# Patient Record
Sex: FEMALE | Race: WHITE | NOT HISPANIC OR LATINO | Employment: OTHER | URBAN - METROPOLITAN AREA
[De-identification: names, ages, dates, MRNs, and addresses within clinical notes are randomized per-mention and may not be internally consistent; named-entity substitution may affect disease eponyms.]

---

## 2018-05-04 ENCOUNTER — OFFICE VISIT (OUTPATIENT)
Dept: URGENT CARE | Facility: URGENT CARE | Age: 58
End: 2018-05-04
Payer: COMMERCIAL

## 2018-05-04 VITALS
HEART RATE: 70 BPM | DIASTOLIC BLOOD PRESSURE: 70 MMHG | HEIGHT: 67 IN | WEIGHT: 150 LBS | BODY MASS INDEX: 23.54 KG/M2 | SYSTOLIC BLOOD PRESSURE: 108 MMHG | TEMPERATURE: 97.6 F | RESPIRATION RATE: 16 BRPM

## 2018-05-04 DIAGNOSIS — L03.115 CELLULITIS OF RIGHT LOWER EXTREMITY: Primary | ICD-10-CM

## 2018-05-04 PROCEDURE — 99203 OFFICE O/P NEW LOW 30 MIN: CPT | Performed by: INTERNAL MEDICINE

## 2018-05-04 RX ORDER — SERTRALINE HYDROCHLORIDE 25 MG/1
25 TABLET, FILM COATED ORAL DAILY
Status: ON HOLD | COMMUNITY
End: 2023-01-01

## 2018-05-04 RX ORDER — ZOLPIDEM TARTRATE 10 MG/1
10 TABLET ORAL AT BEDTIME
COMMUNITY

## 2018-05-04 RX ORDER — CEPHALEXIN 500 MG/1
500 CAPSULE ORAL 3 TIMES DAILY
Qty: 30 CAPSULE | Refills: 0 | Status: SHIPPED | OUTPATIENT
Start: 2018-05-04 | End: 2019-03-10

## 2018-05-04 ASSESSMENT — ENCOUNTER SYMPTOMS: FEVER: 0

## 2018-05-04 NOTE — MR AVS SNAPSHOT
After Visit Summary   5/4/2018    Gabriela Gray    MRN: [id number]           Patient Information     Date Of Birth          1960        Visit Information        Provider Department      5/4/2018 5:15 PM Gilma Murray MD [facility]        Today's Diagnoses     Cellulitis of right lower extremity    -  1      Care Instructions    Keflex 2 x day 10 dys  Yogurt. Probiotics  Watch for fever  Elevate  Rest extremity    Recheck Monday with primary        Cellulitis  Cellulitis is an infection of the deep layers of skin. A break in the skin, such as a cut or scratch, can let bacteria under the skin. If the bacteria get to deep layers of the skin, it can be serious. If not treated, cellulitis can get into the bloodstream and lymph nodes. The infection can then spread throughout the body. This causes serious illness.  Cellulitis causes the affected skin to become red, swollen, warm, and sore. The reddened areas have a visible border. An open sore may leak fluid (pus). You may have a fever, chills, and pain.  Cellulitis is treated with antibiotics taken for 7 to 10 days. An open sore may be cleaned and covered with cool wet gauze. Symptoms should get better 1 to 2 days after treatment is started. Make sure to take all the antibiotics for the full number of days until they are gone. Keep taking the medicine even if your symptoms go away.  Home care  Follow these tips:    Limit the use of the part of your body with cellulitis.     If the infection is on your leg, keep your leg raised while sitting. This will help to reduce swelling.    Take all of the antibiotic medicine exactly as directed until it is gone. Do not miss any doses, especially during the first 7 days. Don t stop taking the medicine when your symptoms get better.    Keep the affected area clean and dry.    Wash your hands with soap and warm water before and after touching your skin. Anyone else who  "touches your skin should also wash his or her hands. Don't share towels.  Follow-up care  Follow up with your healthcare provider, or as advised. If your infection does not go away on the first antibiotic, your healthcare provider will prescribe a different one.  When to seek medical advice  Call your healthcare provider right away if any of these occur:    Red areas that spread    Swelling or pain that gets worse    Fluid leaking from the skin (pus)    Fever higher of 100.4  F (38.0  C) or higher after 2 days on antibiotics  Date Last Reviewed: 9/1/2016 2000-2017 [employer]. [address] [place] [address]. All rights reserved. This information is not intended as a substitute for professional medical care. Always follow your healthcare professional's instructions.                Follow-ups after your visit        Who to contact     If you have questions or need follow up information about today's clinic visit or your schedule please contact [facility] directly at [phone].  Normal or non-critical lab and imaging results will be communicated to you by FrameBlasthart, letter or phone within 4 business days after the clinic has received the results. If you do not hear from us within 7 days, please contact the clinic through SeeSpacet or phone. If you have a critical or abnormal lab result, we will notify you by phone as soon as possible.  Submit refill requests through CommitChange or call your pharmacy and they will forward the refill request to us. Please allow 3 business days for your refill to be completed.          Additional Information About Your Visit        CommitChange Information     CommitChange lets you send messages to your doctor, view your test results, renew your prescriptions, schedule appointments and more. To sign up, go to [email or web address] . Click on \"Log in\" on the left side of the screen, which will take you to the Welcome page. Then click on \"Sign up Now\" " "on the right side of the page.     You will be asked to enter the access code listed below, as well as some personal information. Please follow the directions to create your username and password.     Your access code is: [email or web address]  Expires: 2018  5:38 PM     Your access code will  in 90 days. If you need help or a new code, please call your [facility] clinic or [phone].        Care EveryWhere ID     This is your Care EveryWhere ID. This could be used by other organizations to access your [facility] medical records  [id number]        Your Vitals Were     Pulse Temperature Respirations Height Breastfeeding? BMI (Body Mass Index)    70 97.6  F (36.4  C) (Oral) 16 5' 7\" (1.702 m) No 23.49 kg/m2       Blood Pressure from Last 3 Encounters:   18 108/70   06 96/64    Weight from Last 3 Encounters:   18 150 lb (68 kg)              Today, you had the following     No orders found for display         Today's Medication Changes          These changes are accurate as of 18  5:41 PM.  If you have any questions, ask your nurse or doctor.               Start taking these medicines.        Dose/Directions    cephALEXin 500 MG capsule   Commonly known as:  KEFLEX   Used for:  Cellulitis of right lower extremity   Started by:  Gilma Murray MD        Dose:  500 mg   Take 1 capsule (500 mg) by mouth 3 times daily   Quantity:  30 capsule   Refills:  0         Stop taking these medicines if you haven't already. Please contact your care team if you have questions.     ZANTAC 150 MG tablet   Generic drug:  ranitidine   Stopped by:  Gilma Murray MD           ZEGERID  MG per capsule   Generic drug:  omeprazole-sodium bicarbonate   Stopped by:  Gilma Murray MD                Where to get your medicines      These medications were sent to [id number] - [place], [place] - [address]  [address] [facility] [address]     Phone:  [phone]     " cephALEXin 500 MG capsule                Primary Care Provider Office Phone # Fax #    Hanan J Rosenstein, [phone]       [facility] [address] [address]        Equal Access to Services     EDMUNDO DUNCAN : Zuly jeter ricoo Sodorothyali, waaxda luqadaha, qaybta kaalmada adeegyada, obdulio aun peytonfeng hidalgo lester mccartney. So [facility] [phone].    ATENCIÓN: Si habla español, tiene a ryan disposición servicios gratuitos de asistencia lingüística. Llame al [phone].    We comply with applicable federal civil rights laws and [place] laws. We do not discriminate on the basis of race, color, national origin, age, disability, sex, sexual orientation, or gender identity.            Thank you!     Thank you for choosing [facility]  for your care. Our goal is always to provide you with excellent care. Hearing back from our patients is one way we can continue to improve our services. Please take a few minutes to complete the written survey that you may receive in the mail after your visit with us. Thank you!             Your Updated Medication List - Protect others around you: Learn how to safely use, store and throw away your medicines at www.disposemymeds.org.          This list is accurate as of 5/4/18  5:41 PM.  Always use your most recent med list.                   Brand Name Dispense Instructions for use Diagnosis    AMBIEN PO           cephALEXin 500 MG capsule    KEFLEX    30 capsule    Take 1 capsule (500 mg) by mouth 3 times daily    Cellulitis of right lower extremity       NAPROSYN 500 MG tablet   Generic drug:  naproxen     50    One tab po bid x 7 days , then BID prn        WELLBUTRIN PO           ZOLOFT PO      Take by mouth daily

## 2018-05-04 NOTE — PATIENT INSTRUCTIONS
Keflex 2 x day 10 dys  Yogurt. Probiotics  Watch for fever  Elevate  Rest extremity    Recheck Monday with primary        Cellulitis  Cellulitis is an infection of the deep layers of skin. A break in the skin, such as a cut or scratch, can let bacteria under the skin. If the bacteria get to deep layers of the skin, it can be serious. If not treated, cellulitis can get into the bloodstream and lymph nodes. The infection can then spread throughout the body. This causes serious illness.  Cellulitis causes the affected skin to become red, swollen, warm, and sore. The reddened areas have a visible border. An open sore may leak fluid (pus). You may have a fever, chills, and pain.  Cellulitis is treated with antibiotics taken for 7 to 10 days. An open sore may be cleaned and covered with cool wet gauze. Symptoms should get better 1 to 2 days after treatment is started. Make sure to take all the antibiotics for the full number of days until they are gone. Keep taking the medicine even if your symptoms go away.  Home care  Follow these tips:    Limit the use of the part of your body with cellulitis.     If the infection is on your leg, keep your leg raised while sitting. This will help to reduce swelling.    Take all of the antibiotic medicine exactly as directed until it is gone. Do not miss any doses, especially during the first 7 days. Don t stop taking the medicine when your symptoms get better.    Keep the affected area clean and dry.    Wash your hands with soap and warm water before and after touching your skin. Anyone else who touches your skin should also wash his or her hands. Don't share towels.  Follow-up care  Follow up with your healthcare provider, or as advised. If your infection does not go away on the first antibiotic, your healthcare provider will prescribe a different one.  When to seek medical advice  Call your healthcare provider right away if any of these occur:    Red areas that spread    Swelling or  pain that gets worse    Fluid leaking from the skin (pus)    Fever higher of 100.4  F (38.0  C) or higher after 2 days on antibiotics  Date Last Reviewed: 9/1/2016 2000-2017 [employer]. [address] [place] [address], [place] [address]. All rights reserved. This information is not intended as a substitute for professional medical care. Always follow your healthcare professional's instructions.

## 2018-05-04 NOTE — PROGRESS NOTES
"SUBJECTIVE:   Gabriela Gray is a 58 year old female presenting with a chief complaint of   Chief Complaint   Patient presents with     Urgent Care     Derm Problem     suffers from erythromalagia. feet are always red and burning, today right foot is puffy and rash.        She is a new patient of [facility].        Onset of symptoms was 3 day(s) ago.  Location: right foot  Context:   Patient has a history of   Past Medical History:   Diagnosis Date     Depression      Erythromelalgia (H)      She states this causes chronic debilitating red burning painful feet.  She is tried multiple different treatments over the years and nothing has been effective.  She usually just ends up treating with cold packs or cold water.    Recently she thought she might have the start of athlete's foot and started treatment with a fungal.    The past 3 days she has noticed increased swelling in her right foot.  She does have an open sore scab on the back of her ankle.  She is concerned she may have infection    There is no increased redness or pain development in her right foot abnormal    No fevers        Review of Systems   Constitutional: Negative for fever.       Past Medical History:   Diagnosis Date     Depression      Erythromelalgia (H)      No family history on file.  Current Outpatient Prescriptions   Medication Sig Dispense Refill     BuPROPion HCl (WELLBUTRIN PO)        cephALEXin (KEFLEX) 500 MG capsule Take 1 capsule (500 mg) by mouth 3 times daily 30 capsule 0     NAPROSYN 500 MG OR TABS One tab po bid x 7 days , then BID prn 50 0     Sertraline HCl (ZOLOFT PO) Take by mouth daily       Zolpidem Tartrate (AMBIEN PO)        Social History   Substance Use Topics     Smoking status: Never Smoker     Smokeless tobacco: Never Used     Alcohol use Not on file       OBJECTIVE  /70  Pulse 70  Temp 97.6  F (36.4  C) (Oral)  Resp 16  Ht 5' 7\" (1.702 m)  Wt 150 lb (68 kg)  Breastfeeding? No  BMI 23.49 " kg/m2    Physical Exam   Constitutional: She appears well-developed and well-nourished.   Musculoskeletal:   Bilateral lower extremities reveal  Both the being intensely bright red skin changes more involving the toes and moccasin distribution otherwise of both feet    These are chronic changes for her related to her condition.  Right foot is increased swollen compared to left.  Near Achilles tendon there are some scabbing lesions.    This was the area that she thought was the fungal infection       Labs:  No results found for this or any previous visit (from the past 24 hour(s)).        ASSESSMENT:      ICD-10-CM    1. Cellulitis of right lower extremity L03.115 cephALEXin (KEFLEX) 500 MG capsule        Medical Decision Making:    Differential Diagnosis:  Most likely cellulitis with source being open wound near the Achilles tendon.        PLAN:        Patient Instructions   Keflex 2 x day 10 dys  Yogurt. Probiotics  Watch for fever  Elevate  Rest extremity    Recheck Monday with primary        Cellulitis  Cellulitis is an infection of the deep layers of skin. A break in the skin, such as a cut or scratch, can let bacteria under the skin. If the bacteria get to deep layers of the skin, it can be serious. If not treated, cellulitis can get into the bloodstream and lymph nodes. The infection can then spread throughout the body. This causes serious illness.  Cellulitis causes the affected skin to become red, swollen, warm, and sore. The reddened areas have a visible border. An open sore may leak fluid (pus). You may have a fever, chills, and pain.  Cellulitis is treated with antibiotics taken for 7 to 10 days. An open sore may be cleaned and covered with cool wet gauze. Symptoms should get better 1 to 2 days after treatment is started. Make sure to take all the antibiotics for the full number of days until they are gone. Keep taking the medicine even if your symptoms go away.  Home care  Follow these tips:    Limit the  use of the part of your body with cellulitis.     If the infection is on your leg, keep your leg raised while sitting. This will help to reduce swelling.    Take all of the antibiotic medicine exactly as directed until it is gone. Do not miss any doses, especially during the first 7 days. Don t stop taking the medicine when your symptoms get better.    Keep the affected area clean and dry.    Wash your hands with soap and warm water before and after touching your skin. Anyone else who touches your skin should also wash his or her hands. Don't share towels.  Follow-up care  Follow up with your healthcare provider, or as advised. If your infection does not go away on the first antibiotic, your healthcare provider will prescribe a different one.  When to seek medical advice  Call your healthcare provider right away if any of these occur:    Red areas that spread    Swelling or pain that gets worse    Fluid leaking from the skin (pus)    Fever higher of 100.4  F (38.0  C) or higher after 2 days on antibiotics  Date Last Reviewed: 9/1/2016 2000-2017 [employer]. [address] [place] [address], [place] [address]. All rights reserved. This information is not intended as a substitute for professional medical care. Always follow your healthcare professional's instructions.

## 2019-03-10 ENCOUNTER — OFFICE VISIT (OUTPATIENT)
Dept: URGENT CARE | Facility: URGENT CARE | Age: 59
End: 2019-03-10
Payer: COMMERCIAL

## 2019-03-10 VITALS
DIASTOLIC BLOOD PRESSURE: 66 MMHG | WEIGHT: 145 LBS | BODY MASS INDEX: 22.76 KG/M2 | RESPIRATION RATE: 14 BRPM | SYSTOLIC BLOOD PRESSURE: 90 MMHG | HEIGHT: 67 IN | HEART RATE: 80 BPM | TEMPERATURE: 99.3 F

## 2019-03-10 DIAGNOSIS — D72.10 EOSINOPHILIA: ICD-10-CM

## 2019-03-10 DIAGNOSIS — R10.11 RUQ ABDOMINAL PAIN: Primary | ICD-10-CM

## 2019-03-10 LAB
BASOPHILS # BLD AUTO: 0 10E9/L (ref 0–0.2)
BASOPHILS NFR BLD AUTO: 0.2 %
DIFFERENTIAL METHOD BLD: ABNORMAL
EOSINOPHIL # BLD AUTO: 4.2 10E9/L (ref 0–0.7)
EOSINOPHIL NFR BLD AUTO: 46.6 %
ERYTHROCYTE [DISTWIDTH] IN BLOOD BY AUTOMATED COUNT: 12.2 % (ref 10–15)
HCT VFR BLD AUTO: 39.4 % (ref 35–47)
HGB BLD-MCNC: 12.9 G/DL (ref 11.7–15.7)
LYMPHOCYTES # BLD AUTO: 0.9 10E9/L (ref 0.8–5.3)
LYMPHOCYTES NFR BLD AUTO: 9.7 %
MCH RBC QN AUTO: 29.3 PG (ref 26.5–33)
MCHC RBC AUTO-ENTMCNC: 32.7 G/DL (ref 31.5–36.5)
MCV RBC AUTO: 90 FL (ref 78–100)
MONOCYTES # BLD AUTO: 0.4 10E9/L (ref 0–1.3)
MONOCYTES NFR BLD AUTO: 4.5 %
NEUTROPHILS # BLD AUTO: 3.5 10E9/L (ref 1.6–8.3)
NEUTROPHILS NFR BLD AUTO: 39 %
PLATELET # BLD AUTO: 196 10E9/L (ref 150–450)
RBC # BLD AUTO: 4.4 10E12/L (ref 3.8–5.2)
WBC # BLD AUTO: 9 10E9/L (ref 4–11)

## 2019-03-10 PROCEDURE — 80053 COMPREHEN METABOLIC PANEL: CPT | Performed by: PHYSICIAN ASSISTANT

## 2019-03-10 PROCEDURE — 99214 OFFICE O/P EST MOD 30 MIN: CPT | Performed by: PHYSICIAN ASSISTANT

## 2019-03-10 PROCEDURE — 83690 ASSAY OF LIPASE: CPT | Performed by: PHYSICIAN ASSISTANT

## 2019-03-10 PROCEDURE — 36415 COLL VENOUS BLD VENIPUNCTURE: CPT | Performed by: PHYSICIAN ASSISTANT

## 2019-03-10 PROCEDURE — 85025 COMPLETE CBC W/AUTO DIFF WBC: CPT | Performed by: PHYSICIAN ASSISTANT

## 2019-03-10 ASSESSMENT — ENCOUNTER SYMPTOMS
DIARRHEA: 0
FOCAL WEAKNESS: 0
CHILLS: 0
VOMITING: 0
FEVER: 0
ABDOMINAL PAIN: 1
HEADACHES: 0
NAUSEA: 0
CONSTIPATION: 0
BLOOD IN STOOL: 0
SHORTNESS OF BREATH: 0

## 2019-03-10 ASSESSMENT — MIFFLIN-ST. JEOR: SCORE: 1255.35

## 2019-03-10 NOTE — PROGRESS NOTES
HPI  March 10, 2019    HPI: Gabriela Gray is a 59 year old female who complains of moderate RUQ abdominal pain onset 4 days ago. Pain is intermittent and is often aggravated by eating. Pain wraps around into R mid thoracic back. Pt also reports frequent soft yellow stools and feeling gassy & bloated. No hx similar symptoms previously. She has an appt with her PCP tomorrow morning but was uncomfortable so felt she should come into urgent care today. She took ibuprofen & Tylenol yesterday with some mild relief. Denies fever/chills, HA, CP, SOB, N/V/D, constipation, urinary sx, hematochezia, melena, rash, or any other symptoms. No hx of abdominal surgeries.    Past Medical History:   Diagnosis Date     Depression      Erythromelalgia (H)      History reviewed. No pertinent surgical history.  Social History     Tobacco Use     Smoking status: Never Smoker     Smokeless tobacco: Never Used   Substance Use Topics     Alcohol use: Not on file     Drug use: Not on file     There is no problem list on file for this patient.    History reviewed. No pertinent family history.     Problem list, Medication list, Allergies, and Medical/Social/Surgical histories reviewed in [facility] and updated as appropriate.      Review of Systems   Constitutional: Negative for chills and fever.   Respiratory: Negative for shortness of breath.    Cardiovascular: Negative for chest pain.   Gastrointestinal: Positive for abdominal pain. Negative for blood in stool, constipation, diarrhea, nausea and vomiting.   Skin: Negative for rash.   Neurological: Negative for focal weakness and headaches.   All other systems reviewed and are negative.        Physical Exam   Constitutional: She is oriented to person, place, and time.   HENT:   Head: Normocephalic and atraumatic.   Cardiovascular: Normal rate, regular rhythm and normal heart sounds.   Pulmonary/Chest: Effort normal and breath sounds normal.   Abdominal: Normal appearance. There is  "tenderness in the right upper quadrant. There is positive Teran's sign. There is no rigidity, no rebound, no guarding and no CVA tenderness.       Musculoskeletal: Normal range of motion.   Neurological: She is alert and oriented to person, place, and time.   Skin: Skin is warm and dry.   Nursing note and vitals reviewed.    Vital Signs  BP 90/66   Pulse 80   Temp 99.3  F (37.4  C) (Oral)   Resp 14   Ht 1.702 m (5' 7\")   Wt 65.8 kg (145 lb)   BMI 22.71 kg/m       Diagnostic Test Results:  Results for orders placed or performed in visit on 03/10/19 (from the past 24 hour(s))   CBC with platelets differential   Result Value Ref Range    WBC 9.0 4.0 - 11.0 10e9/L    RBC Count 4.40 3.8 - 5.2 10e12/L    Hemoglobin 12.9 11.7 - 15.7 g/dL    Hematocrit 39.4 35.0 - 47.0 %    MCV 90 78 - 100 fl    MCH 29.3 26.5 - 33.0 pg    MCHC 32.7 31.5 - 36.5 g/dL    RDW 12.2 10.0 - 15.0 %    Platelet Count 196 150 - 450 10e9/L    % Neutrophils 39.0 %    % Lymphocytes 9.7 %    % Monocytes 4.5 %    % Eosinophils 46.6 %    % Basophils 0.2 %    Absolute Neutrophil 3.5 1.6 - 8.3 10e9/L    Absolute Lymphocytes 0.9 0.8 - 5.3 10e9/L    Absolute Monocytes 0.4 0.0 - 1.3 10e9/L    Absolute Eosinophils 4.2 (H) 0.0 - 0.7 10e9/L    Absolute Basophils 0.0 0.0 - 0.2 10e9/L    Diff Method Automated Method        ASSESSMENT/PLAN      ICD-10-CM    1. RUQ abdominal pain R10.11 acetaminophen-codeine (TYLENOL #3) 300-30 MG tablet     CBC with platelets differential     Lipase     Comprehensive metabolic panel   2. Eosinophilia D72.1        Differential diagnosis includes cholecystitis, choledocholithiasis, pancreatitis, PUD/gastritis, viral gastroenteritis.   Pt with RUQ tenderness and positive Teran's sign; no peritoneal signs & afebrile. Pain is colicky & worse after eating. Suspect cholecystitis. No leukocytosis but pt does have eosinophilia- may be due to primary biliary cirrhosis, sclerosing cholangitis, eosinophilic cholangitis, or eosinophilic " cholecystitis. Lipase & CMP pending. Will Rx Tylenol #3 for pain relief, advised avoiding fatty foods. Pt has f/u appt with PCP tomorrow, PCP can order RUQ ultrasound or other imaging/testing she deems appropriate.    I have discussed any lab or imaging results, the patient's diagnosis, and my plan of treatment with the patient and/or family. Patient is aware to come back in if with worsening symptoms or if no relief despite treatment plan.  Patient voiced understanding and had no further questions.       Follow Up: Return in about 1 day (around 3/11/2019) for follow-up with primary care .    ITALO Vidal, PARebecaC  [facility]

## 2019-03-10 NOTE — PATIENT INSTRUCTIONS
Patient Education     Cholecystitis (Presumed)    Your abdominal pain may be due to an inflammation and possible infection in the gallbladder. This is called cholecystitis. The gallbladder is a small sac under the liver, which stores and releases bile. Bile is a fluid made in the liver that aids in the digestion of fat. Eating fatty food stimulates the gallbladder to contract, and release the bile. Gallstones may form in this sac (called cholelithiasis). Although most people do not have symptoms, if the stone moves and blocks the passage of bile out of the bladder, it can cause pain and even an infection. The infection is called cholecystitis. Bile sludge without a stone can also cause cholecystitis.  To be more certain of the diagnosis, you may need to have an ultrasound, CT scan or other special test.  A number of things increase the risk of developing gallstones:    Women are more likely to have the problem    Obesity    Increasing age    Losing or gaining weight quickly    High calorie diet    Pregnancy    Hormone therapy    Diabetes  The most common symptoms are:    Abdominal pain, cramping, aching    Nausea, vomiting    Fever  Many illnesses can cause these symptoms. This pain usually starts in the upper right side of your abdomen. Sometimes it can radiate to your right shoulder, back and arm. It usually starts suddenly, becomes more intense quickly, and then gradually decreases and disappears over a couple of hours. Elderly people and diabetics may have difficulty showing where the pain is exactly. The pain may occur after meals, particularly fatty meals.  Home care    Rest in bed and follow a clear liquid diet until the pain, nausea and vomiting go away.    Antibiotics and other medicine may be prescribed. Take these exactly as directed.    You can take acetaminophen or ibuprofen for pain, unless you were given a different pain medicine to use. Note: If you have chronic liver or kidney disease or ever had a  stomach ulcer or GI bleeding or are taking blood thinner medicines, talk with your healthcare provider before using these medicines.    Fat in your diet makes the gallbladder contract and may cause increased pain. Therefore, avoid fat in your diet over the next two days and follow a low-fat diet thereafter. If you are overweight, a low-fat diet will help you lose weight.  Follow-up care  An infection in the gallbladder is a serious problem and must be watched carefully. Keep any appointments made for you to have further testing. See your healthcare provider for another exam in the next 1-2 days, or as directed by our staff. Once cholecystitis has occurred, removal of the gallbladder is usually required to prevent a recurrence. You can discuss this at your follow-up visit. If you were hospitalized for cholecystitis, your gallbladder may be removed during that same hospital stay. Gallstones that are not causing infection or symptoms often do not need surgery.  When to seek medical advice  Call your healthcare provider if any of the following occur:    Repeated vomiting    Swelling of the abdomen    Pain lasting over 6 hours    Fever of 100.4 F (38 C) or higher, or as directed by your healthcare provider    Weakness, dizziness, or fainting    Dark urine or light colored stools    Yellow color of the skin or eyes    Chest, arm, back, neck, or jaw pain  Date Last Reviewed: 9/1/2017 2000-2018 [employer]. [address] [place] [address], [place] [address]. All rights reserved. This information is not intended as a substitute for professional medical care. Always follow your healthcare professional's instructions.

## 2019-03-11 LAB
ALBUMIN SERPL-MCNC: 3.9 G/DL (ref 3.4–5)
ALP SERPL-CCNC: 68 U/L (ref 40–150)
ALT SERPL W P-5'-P-CCNC: 15 U/L (ref 0–50)
ANION GAP SERPL CALCULATED.3IONS-SCNC: 8 MMOL/L (ref 3–14)
AST SERPL W P-5'-P-CCNC: 17 U/L (ref 0–45)
BILIRUB SERPL-MCNC: 0.2 MG/DL (ref 0.2–1.3)
BUN SERPL-MCNC: 10 MG/DL (ref 7–30)
CALCIUM SERPL-MCNC: 9.2 MG/DL (ref 8.5–10.1)
CHLORIDE SERPL-SCNC: 106 MMOL/L (ref 94–109)
CO2 SERPL-SCNC: 26 MMOL/L (ref 20–32)
CREAT SERPL-MCNC: 0.72 MG/DL (ref 0.52–1.04)
GFR SERPL CREATININE-BSD FRML MDRD: 90 ML/MIN/{1.73_M2}
GLUCOSE SERPL-MCNC: 78 MG/DL (ref 70–99)
LIPASE SERPL-CCNC: 220 U/L (ref 73–393)
POTASSIUM SERPL-SCNC: 4.4 MMOL/L (ref 3.4–5.3)
PROT SERPL-MCNC: 7.1 G/DL (ref 6.8–8.8)
SODIUM SERPL-SCNC: 140 MMOL/L (ref 133–144)

## 2019-03-13 ENCOUNTER — TELEPHONE (OUTPATIENT)
Dept: FAMILY MEDICINE | Facility: CLINIC | Age: 59
End: 2019-03-13

## 2019-03-13 NOTE — TELEPHONE ENCOUNTER
Called pt re:    Angelita Morales PA-C P  [facility] Nurse             Please call patient and let her know her complete blood count showed an elevation in eosinophils which is a specialized type of immune cell that can be elevated in many different circumstances- possible causes in this case would be an autoimmune cause of gallbladder stones or inflammation. She can discuss this further with her primary care provider tomorrow.   Metabolic panel (liver function) & pancreas testing are still pending.     Thanks,   JAMAAL Argueta-       Pt stated she thought she was doing better but had another attack yesterday, US of abdomen was normal.     Scheduled follow up appt for pt on 3/19/19 as her primary has retired,  areli landis

## 2020-01-14 ENCOUNTER — OFFICE VISIT (OUTPATIENT)
Dept: URGENT CARE | Facility: URGENT CARE | Age: 60
End: 2020-01-14

## 2020-01-14 VITALS
DIASTOLIC BLOOD PRESSURE: 64 MMHG | HEART RATE: 80 BPM | HEIGHT: 67 IN | RESPIRATION RATE: 12 BRPM | BODY MASS INDEX: 23.54 KG/M2 | WEIGHT: 150 LBS | SYSTOLIC BLOOD PRESSURE: 102 MMHG | TEMPERATURE: 97.9 F

## 2020-01-14 DIAGNOSIS — L03.032 CELLULITIS OF TOE OF LEFT FOOT: Primary | ICD-10-CM

## 2020-01-14 PROCEDURE — 99213 OFFICE O/P EST LOW 20 MIN: CPT

## 2020-01-14 RX ORDER — CEPHALEXIN 500 MG/1
500 CAPSULE ORAL 3 TIMES DAILY
Qty: 30 CAPSULE | Refills: 0 | Status: SHIPPED | OUTPATIENT
Start: 2020-01-14 | End: 2020-01-24

## 2020-01-14 ASSESSMENT — MIFFLIN-ST. JEOR: SCORE: 1278.03

## 2020-01-14 NOTE — PATIENT INSTRUCTIONS
Patient Education     Cellulitis  Cellulitis is an infection of the deep layers of skin. A break in the skin, such as a cut or scratch, can let bacteria under the skin. If the bacteria get to deep layers of the skin, it can be serious. If not treated, cellulitis can get into the bloodstream and lymph nodes. The infection can then spread throughout the body. This causes serious illness.  Cellulitis causes the affected skin to become red, swollen, warm, and sore. The reddened areas have a visible border. An open sore may leak fluid (pus). You may have a fever, chills, and pain.  Cellulitis is treated with antibiotics taken for 7 to 10 days. An open sore may be cleaned and covered with cool wet gauze. Symptoms should get better 1 to 2 days after treatment is started. Make sure to take all the antibiotics for the full number of days until they are gone. Keep taking the medicine even if your symptoms go away.  Home care  Follow these tips:    Limit the use of the part of your body with cellulitis.     If the infection is on your leg, keep your leg raised while sitting. This will help to reduce swelling.    Take all of the antibiotic medicine exactly as directed until it is gone. Do not miss any doses, especially during the first 7 days. Don t stop taking the medicine when your symptoms get better.    Keep the affected area clean and dry.    Wash your hands with soap and warm water before and after touching your skin. Anyone else who touches your skin should also wash his or her hands. Don't share towels.  Follow-up care  Follow up with your healthcare provider, or as advised. If your infection does not go away on the first antibiotic, your healthcare provider will prescribe a different one.  When to seek medical advice  Call your healthcare provider right away if any of these occur:    Red areas that spread    Swelling or pain that gets worse    Fluid leaking from the skin (pus)    Fever higher of 100.4  F (38.0  C) or  higher after 2 days on antibiotics  Date Last Reviewed: 9/1/2016 2000-2019 [employer]. [address] [place] [address], [place] [address]. All rights reserved. This information is not intended as a substitute for professional medical care. Always follow your healthcare professional's instructions.

## 2020-01-14 NOTE — PROGRESS NOTES
"SUBJECTIVE:   Gabriela Gray is a 59 year old female presenting with a chief complaint of   Chief Complaint   Patient presents with     Urgent Care     Derm Problem     left great toe, concern of infection. symptoms x 4 days.     warmth swelling and pain in her left great toe that began 4 days ago. Patient denies fevers, chills, or other flu-like symptoms. Patient denies any abscess formation or discharge from the toe. She denies any recent tears in the skin, bug bites, or ingrown toe nails. No history of MRSA    She is an established patient of [facility].    Review of Systems      Review Of Systems  Skin: See HPI  Eyes: negative  Ears/Nose/Throat: negative  Respiratory: No shortness of breath, dyspnea on exertion, cough, or hemoptysis  Cardiovascular: negative  Gastrointestinal: negative  Genitourinary: negative  Musculoskeletal: negative  Neurologic: negative  Psychiatric: negative  Hematologic/Lymphatic/Immunologic: negative  Endocrine: negative    Past Medical History:   Diagnosis Date     Depression      Erythromelalgia (H)      No family history on file.  Current Outpatient Medications   Medication Sig Dispense Refill     BuPROPion HCl (WELLBUTRIN PO)        Sertraline HCl (ZOLOFT PO) Take by mouth daily       NAPROSYN 500 MG OR TABS One tab po bid x 7 days , then BID prn 50 0     Zolpidem Tartrate (AMBIEN PO)        Social History     Tobacco Use     Smoking status: Never Smoker     Smokeless tobacco: Never Used   Substance Use Topics     Alcohol use: Not on file       OBJECTIVE  /64   Pulse 80   Temp 97.9  F (36.6  C) (Oral)   Resp 12   Ht 1.702 m (5' 7\")   Wt 68 kg (150 lb)   BMI 23.49 kg/m      Physical Exam    Exam:  Constitutional: healthy, alert and no distress  Cardiovascular: negative, PMI normal. No lifts, heaves, or thrills. RRR. No murmurs, clicks gallops or rub  Respiratory: negative, Percussion normal. Good diaphragmatic excursion. Lungs clear  Musculoskeletal: extremities " normal- no gross deformities noted, gait normal and normal muscle tone  Skin: Patient's entire left great toe is inflamed. There is noted erythema, swelling, and moderate tenderness throughout the toe. This does not extend to neighboring toes.   Neurologic: Gait normal. Reflexes normal and symmetric. Sensation grossly WNL.  Psychiatric: mentation appears normal and affect normal/bright        ASSESSMENT/PLAN:      (L03.032) Cellulitis of toe of left foot  (primary encounter diagnosis)  Plan: cephALEXin (KEFLEX) 500 MG capsule    Patient was advised to return to clinic if symptoms do not improve in the amount of time specified in the AVS or if symptoms worsen. Patient educated on red flag symptoms and asked to go directly to the ED if symptoms present themselves.     Jose Gamez PA-C on 1/14/2020 at 4:33 PM

## 2023-01-01 ENCOUNTER — TELEPHONE (OUTPATIENT)
Dept: BEHAVIORAL HEALTH | Facility: CLINIC | Age: 63
End: 2023-01-01
Payer: COMMERCIAL

## 2023-01-01 ENCOUNTER — APPOINTMENT (OUTPATIENT)
Dept: ULTRASOUND IMAGING | Facility: CLINIC | Age: 63
DRG: 918 | End: 2023-01-01
Attending: INTERNAL MEDICINE
Payer: COMMERCIAL

## 2023-01-01 ENCOUNTER — APPOINTMENT (OUTPATIENT)
Dept: PHYSICAL THERAPY | Facility: CLINIC | Age: 63
DRG: 918 | End: 2023-01-01
Attending: INTERNAL MEDICINE
Payer: COMMERCIAL

## 2023-01-01 ENCOUNTER — APPOINTMENT (OUTPATIENT)
Dept: GENERAL RADIOLOGY | Facility: CLINIC | Age: 63
DRG: 918 | End: 2023-01-01
Attending: NURSE PRACTITIONER
Payer: COMMERCIAL

## 2023-01-01 ENCOUNTER — APPOINTMENT (OUTPATIENT)
Dept: ULTRASOUND IMAGING | Facility: CLINIC | Age: 63
DRG: 918 | End: 2023-01-01
Attending: STUDENT IN AN ORGANIZED HEALTH CARE EDUCATION/TRAINING PROGRAM
Payer: COMMERCIAL

## 2023-01-01 ENCOUNTER — HOSPITAL ENCOUNTER (OUTPATIENT)
Dept: BEHAVIORAL HEALTH | Facility: CLINIC | Age: 63
Discharge: HOME | End: 2023-11-13
Attending: PSYCHIATRY & NEUROLOGY | Admitting: PSYCHIATRY & NEUROLOGY
Payer: COMMERCIAL

## 2023-01-01 ENCOUNTER — APPOINTMENT (OUTPATIENT)
Dept: PHYSICAL THERAPY | Facility: CLINIC | Age: 63
DRG: 918 | End: 2023-01-01
Payer: COMMERCIAL

## 2023-01-01 ENCOUNTER — HOSPITAL ENCOUNTER (INPATIENT)
Facility: CLINIC | Age: 63
LOS: 10 days | Discharge: PSYCHIATRIC OR SUBSTANCE USE FACILITY | DRG: 918 | End: 2023-10-03
Attending: EMERGENCY MEDICINE | Admitting: INTERNAL MEDICINE
Payer: COMMERCIAL

## 2023-01-01 ENCOUNTER — APPOINTMENT (OUTPATIENT)
Dept: OCCUPATIONAL THERAPY | Facility: CLINIC | Age: 63
DRG: 918 | End: 2023-01-01
Payer: COMMERCIAL

## 2023-01-01 ENCOUNTER — BEH TREATMENT PLAN (OUTPATIENT)
Dept: BEHAVIORAL HEALTH | Facility: CLINIC | Age: 63
End: 2023-01-01
Attending: PSYCHIATRY & NEUROLOGY
Payer: COMMERCIAL

## 2023-01-01 ENCOUNTER — TELEPHONE (OUTPATIENT)
Dept: BEHAVIORAL HEALTH | Facility: CLINIC | Age: 63
End: 2023-01-01

## 2023-01-01 ENCOUNTER — APPOINTMENT (OUTPATIENT)
Dept: CT IMAGING | Facility: CLINIC | Age: 63
DRG: 918 | End: 2023-01-01
Attending: EMERGENCY MEDICINE
Payer: COMMERCIAL

## 2023-01-01 ENCOUNTER — APPOINTMENT (OUTPATIENT)
Dept: OCCUPATIONAL THERAPY | Facility: CLINIC | Age: 63
DRG: 918 | End: 2023-01-01
Attending: NURSE PRACTITIONER
Payer: COMMERCIAL

## 2023-01-01 ENCOUNTER — APPOINTMENT (OUTPATIENT)
Dept: MRI IMAGING | Facility: CLINIC | Age: 63
DRG: 918 | End: 2023-01-01
Attending: HOSPITALIST
Payer: COMMERCIAL

## 2023-01-01 ENCOUNTER — APPOINTMENT (OUTPATIENT)
Dept: GENERAL RADIOLOGY | Facility: CLINIC | Age: 63
DRG: 918 | End: 2023-01-01
Attending: EMERGENCY MEDICINE
Payer: COMMERCIAL

## 2023-01-01 ENCOUNTER — APPOINTMENT (OUTPATIENT)
Dept: PHYSICAL THERAPY | Facility: CLINIC | Age: 63
DRG: 918 | End: 2023-01-01
Attending: NURSE PRACTITIONER
Payer: COMMERCIAL

## 2023-01-01 ENCOUNTER — HOSPITAL ENCOUNTER (OUTPATIENT)
Dept: BEHAVIORAL HEALTH | Facility: CLINIC | Age: 63
Discharge: HOME | End: 2023-11-01
Attending: PSYCHIATRY & NEUROLOGY | Admitting: PSYCHIATRY & NEUROLOGY
Payer: COMMERCIAL

## 2023-01-01 VITALS
WEIGHT: 142.1 LBS | OXYGEN SATURATION: 99 % | DIASTOLIC BLOOD PRESSURE: 47 MMHG | HEIGHT: 66 IN | SYSTOLIC BLOOD PRESSURE: 129 MMHG | TEMPERATURE: 97.5 F | BODY MASS INDEX: 22.84 KG/M2 | RESPIRATION RATE: 16 BRPM | HEART RATE: 75 BPM

## 2023-01-01 VITALS
HEIGHT: 67 IN | TEMPERATURE: 98.5 F | WEIGHT: 157.41 LBS | RESPIRATION RATE: 16 BRPM | BODY MASS INDEX: 24.71 KG/M2 | HEART RATE: 75 BPM | SYSTOLIC BLOOD PRESSURE: 119 MMHG | DIASTOLIC BLOOD PRESSURE: 66 MMHG | OXYGEN SATURATION: 100 %

## 2023-01-01 VITALS
RESPIRATION RATE: 16 BRPM | WEIGHT: 142.1 LBS | OXYGEN SATURATION: 99 % | SYSTOLIC BLOOD PRESSURE: 129 MMHG | HEART RATE: 75 BPM | DIASTOLIC BLOOD PRESSURE: 47 MMHG | HEIGHT: 66 IN | BODY MASS INDEX: 22.84 KG/M2 | TEMPERATURE: 97.5 F

## 2023-01-01 DIAGNOSIS — E87.5 HYPERKALEMIA: ICD-10-CM

## 2023-01-01 DIAGNOSIS — F33.42 MAJOR DEPRESSIVE DISORDER, RECURRENT EPISODE, IN FULL REMISSION (H): Primary | ICD-10-CM

## 2023-01-01 DIAGNOSIS — T14.91XA SUICIDE ATTEMPT (H): ICD-10-CM

## 2023-01-01 DIAGNOSIS — T50.902A OVERDOSE, INTENTIONAL SELF-HARM, INITIAL ENCOUNTER (H): ICD-10-CM

## 2023-01-01 DIAGNOSIS — G89.4 CHRONIC PAIN DISORDER: ICD-10-CM

## 2023-01-01 DIAGNOSIS — E87.20 LACTIC ACIDOSIS: ICD-10-CM

## 2023-01-01 LAB
ALBUMIN SERPL BCG-MCNC: 3.1 G/DL (ref 3.5–5.2)
ALBUMIN SERPL BCG-MCNC: 3.2 G/DL (ref 3.5–5.2)
ALBUMIN SERPL BCG-MCNC: 3.3 G/DL (ref 3.5–5.2)
ALBUMIN SERPL BCG-MCNC: 3.4 G/DL (ref 3.5–5.2)
ALBUMIN SERPL BCG-MCNC: 3.6 G/DL (ref 3.5–5.2)
ALBUMIN SERPL BCG-MCNC: 3.6 G/DL (ref 3.5–5.2)
ALBUMIN SERPL BCG-MCNC: 4.7 G/DL (ref 3.5–5.2)
ALP SERPL-CCNC: 51 U/L (ref 35–104)
ALP SERPL-CCNC: 52 U/L (ref 35–104)
ALP SERPL-CCNC: 53 U/L (ref 35–104)
ALP SERPL-CCNC: 54 U/L (ref 35–104)
ALP SERPL-CCNC: 56 U/L (ref 35–104)
ALP SERPL-CCNC: 56 U/L (ref 35–104)
ALP SERPL-CCNC: 57 U/L (ref 35–104)
ALP SERPL-CCNC: 64 U/L (ref 35–104)
ALP SERPL-CCNC: 82 U/L (ref 35–104)
ALT SERPL W P-5'-P-CCNC: 122 U/L (ref 0–50)
ALT SERPL W P-5'-P-CCNC: 144 U/L (ref 0–50)
ALT SERPL W P-5'-P-CCNC: 152 U/L (ref 0–50)
ALT SERPL W P-5'-P-CCNC: 188 U/L (ref 0–50)
ALT SERPL W P-5'-P-CCNC: 197 U/L (ref 0–50)
ALT SERPL W P-5'-P-CCNC: 201 U/L (ref 0–50)
ALT SERPL W P-5'-P-CCNC: 213 U/L (ref 0–50)
ALT SERPL W P-5'-P-CCNC: 58 U/L (ref 0–50)
ALT SERPL W P-5'-P-CCNC: 88 U/L (ref 0–50)
AMPHETAMINES UR QL SCN: ABNORMAL
ANION GAP SERPL CALCULATED.3IONS-SCNC: 10 MMOL/L (ref 7–15)
ANION GAP SERPL CALCULATED.3IONS-SCNC: 12 MMOL/L (ref 7–15)
ANION GAP SERPL CALCULATED.3IONS-SCNC: 17 MMOL/L (ref 7–15)
ANION GAP SERPL CALCULATED.3IONS-SCNC: 7 MMOL/L (ref 7–15)
ANION GAP SERPL CALCULATED.3IONS-SCNC: 8 MMOL/L (ref 7–15)
ANION GAP SERPL CALCULATED.3IONS-SCNC: 8 MMOL/L (ref 7–15)
ANION GAP SERPL CALCULATED.3IONS-SCNC: 9 MMOL/L (ref 7–15)
APAP SERPL-MCNC: <5 UG/ML (ref 10–30)
APAP SERPL-MCNC: <5 UG/ML (ref 10–30)
AST SERPL W P-5'-P-CCNC: 1069 U/L (ref 0–45)
AST SERPL W P-5'-P-CCNC: 133 U/L (ref 0–45)
AST SERPL W P-5'-P-CCNC: 209 U/L (ref 0–45)
AST SERPL W P-5'-P-CCNC: 232 U/L (ref 0–45)
AST SERPL W P-5'-P-CCNC: 300 U/L (ref 0–45)
AST SERPL W P-5'-P-CCNC: 59 U/L (ref 0–45)
AST SERPL W P-5'-P-CCNC: 598 U/L (ref 0–45)
AST SERPL W P-5'-P-CCNC: 685 U/L (ref 0–45)
AST SERPL W P-5'-P-CCNC: 773 U/L (ref 0–45)
ATRIAL RATE - MUSE: 85 BPM
BARBITURATES UR QL SCN: ABNORMAL
BASOPHILS # BLD AUTO: 0 10E3/UL (ref 0–0.2)
BASOPHILS NFR BLD AUTO: 0 %
BENZODIAZ UR QL SCN: ABNORMAL
BILIRUB DIRECT SERPL-MCNC: <0.2 MG/DL (ref 0–0.3)
BILIRUB DIRECT SERPL-MCNC: <0.2 MG/DL (ref 0–0.3)
BILIRUB SERPL-MCNC: 0.2 MG/DL
BILIRUB SERPL-MCNC: 0.4 MG/DL
BILIRUB SERPL-MCNC: 0.5 MG/DL
BILIRUB SERPL-MCNC: 0.5 MG/DL
BUN SERPL-MCNC: 10.4 MG/DL (ref 8–23)
BUN SERPL-MCNC: 11.1 MG/DL (ref 8–23)
BUN SERPL-MCNC: 18.1 MG/DL (ref 8–23)
BUN SERPL-MCNC: 3 MG/DL (ref 8–23)
BUN SERPL-MCNC: 3.9 MG/DL (ref 8–23)
BUN SERPL-MCNC: 5.5 MG/DL (ref 8–23)
BUN SERPL-MCNC: 5.5 MG/DL (ref 8–23)
BUN SERPL-MCNC: 6.8 MG/DL (ref 8–23)
BUN SERPL-MCNC: 6.9 MG/DL (ref 8–23)
BUN SERPL-MCNC: 7.4 MG/DL (ref 8–23)
BUN SERPL-MCNC: 9.4 MG/DL (ref 8–23)
BUN SERPL-MCNC: 9.8 MG/DL (ref 8–23)
BZE UR QL SCN: ABNORMAL
CALCIUM SERPL-MCNC: 8.2 MG/DL (ref 8.8–10.2)
CALCIUM SERPL-MCNC: 8.3 MG/DL (ref 8.8–10.2)
CALCIUM SERPL-MCNC: 8.3 MG/DL (ref 8.8–10.2)
CALCIUM SERPL-MCNC: 8.4 MG/DL (ref 8.8–10.2)
CALCIUM SERPL-MCNC: 8.4 MG/DL (ref 8.8–10.2)
CALCIUM SERPL-MCNC: 8.5 MG/DL (ref 8.8–10.2)
CALCIUM SERPL-MCNC: 8.5 MG/DL (ref 8.8–10.2)
CALCIUM SERPL-MCNC: 8.7 MG/DL (ref 8.8–10.2)
CALCIUM SERPL-MCNC: 8.8 MG/DL (ref 8.8–10.2)
CALCIUM SERPL-MCNC: 8.8 MG/DL (ref 8.8–10.2)
CALCIUM SERPL-MCNC: 9 MG/DL (ref 8.8–10.2)
CALCIUM SERPL-MCNC: 9.3 MG/DL (ref 8.8–10.2)
CANNABINOIDS UR QL SCN: ABNORMAL
CANNABINOIDS UR QL SCN: NORMAL
CHLORIDE SERPL-SCNC: 102 MMOL/L (ref 98–107)
CHLORIDE SERPL-SCNC: 104 MMOL/L (ref 98–107)
CHLORIDE SERPL-SCNC: 104 MMOL/L (ref 98–107)
CHLORIDE SERPL-SCNC: 105 MMOL/L (ref 98–107)
CHLORIDE SERPL-SCNC: 105 MMOL/L (ref 98–107)
CHLORIDE SERPL-SCNC: 107 MMOL/L (ref 98–107)
CHLORIDE SERPL-SCNC: 109 MMOL/L (ref 98–107)
CHLORIDE SERPL-SCNC: 111 MMOL/L (ref 98–107)
CHLORIDE SERPL-SCNC: 94 MMOL/L (ref 98–107)
CHLORIDE SERPL-SCNC: 97 MMOL/L (ref 98–107)
CK SERPL-CCNC: 1942 U/L (ref 26–192)
CK SERPL-CCNC: 3767 U/L (ref 26–192)
CK SERPL-CCNC: 6146 U/L (ref 26–192)
CK SERPL-CCNC: 649 U/L (ref 26–192)
CK SERPL-CCNC: 6689 U/L (ref 26–192)
CK SERPL-CCNC: ABNORMAL U/L (ref 26–192)
CREAT SERPL-MCNC: 0.43 MG/DL (ref 0.51–0.95)
CREAT SERPL-MCNC: 0.46 MG/DL (ref 0.51–0.95)
CREAT SERPL-MCNC: 0.46 MG/DL (ref 0.51–0.95)
CREAT SERPL-MCNC: 0.47 MG/DL (ref 0.51–0.95)
CREAT SERPL-MCNC: 0.48 MG/DL (ref 0.51–0.95)
CREAT SERPL-MCNC: 0.49 MG/DL (ref 0.51–0.95)
CREAT SERPL-MCNC: 0.51 MG/DL (ref 0.51–0.95)
CREAT SERPL-MCNC: 0.55 MG/DL (ref 0.51–0.95)
CREAT SERPL-MCNC: 0.59 MG/DL (ref 0.51–0.95)
CREAT SERPL-MCNC: 0.59 MG/DL (ref 0.51–0.95)
CREAT SERPL-MCNC: 0.68 MG/DL (ref 0.51–0.95)
CREAT SERPL-MCNC: 0.95 MG/DL (ref 0.51–0.95)
CREAT UR-MCNC: 14 MG/DL
DEPRECATED HCO3 PLAS-SCNC: 21 MMOL/L (ref 22–29)
DEPRECATED HCO3 PLAS-SCNC: 22 MMOL/L (ref 22–29)
DEPRECATED HCO3 PLAS-SCNC: 24 MMOL/L (ref 22–29)
DEPRECATED HCO3 PLAS-SCNC: 25 MMOL/L (ref 22–29)
DIASTOLIC BLOOD PRESSURE - MUSE: NORMAL MMHG
EDDP CTO UR SCN-MCNC: 174 NG/ML
EDDP/CREAT UR: 1243 NG/MG {CREAT}
EGFRCR SERPLBLD CKD-EPI 2021: 66 ML/MIN/1.73M2
EGFRCR SERPLBLD CKD-EPI 2021: >90 ML/MIN/1.73M2
EOSINOPHIL # BLD AUTO: 0 10E3/UL (ref 0–0.7)
EOSINOPHIL NFR BLD AUTO: 0 %
ERYTHROCYTE [DISTWIDTH] IN BLOOD BY AUTOMATED COUNT: 12 % (ref 10–15)
ERYTHROCYTE [DISTWIDTH] IN BLOOD BY AUTOMATED COUNT: 12.2 % (ref 10–15)
ERYTHROCYTE [DISTWIDTH] IN BLOOD BY AUTOMATED COUNT: 12.3 % (ref 10–15)
ETHANOL SERPL-MCNC: <0.01 G/DL
FENTANYL UR QL: ABNORMAL
GLUCOSE BLDC GLUCOMTR-MCNC: 110 MG/DL (ref 70–99)
GLUCOSE BLDC GLUCOMTR-MCNC: 97 MG/DL (ref 70–99)
GLUCOSE SERPL-MCNC: 100 MG/DL (ref 70–99)
GLUCOSE SERPL-MCNC: 101 MG/DL (ref 70–99)
GLUCOSE SERPL-MCNC: 103 MG/DL (ref 70–99)
GLUCOSE SERPL-MCNC: 104 MG/DL (ref 70–99)
GLUCOSE SERPL-MCNC: 104 MG/DL (ref 70–99)
GLUCOSE SERPL-MCNC: 105 MG/DL (ref 70–99)
GLUCOSE SERPL-MCNC: 114 MG/DL (ref 70–99)
GLUCOSE SERPL-MCNC: 74 MG/DL (ref 70–99)
GLUCOSE SERPL-MCNC: 88 MG/DL (ref 70–99)
GLUCOSE SERPL-MCNC: 89 MG/DL (ref 70–99)
GLUCOSE SERPL-MCNC: 94 MG/DL (ref 70–99)
GLUCOSE SERPL-MCNC: 96 MG/DL (ref 70–99)
HCO3 BLDV-SCNC: 28 MMOL/L (ref 21–28)
HCO3 SERPL-SCNC: 21 MMOL/L (ref 22–29)
HCT VFR BLD AUTO: 32.9 % (ref 35–47)
HCT VFR BLD AUTO: 33.6 % (ref 35–47)
HCT VFR BLD AUTO: 42.6 % (ref 35–47)
HGB BLD-MCNC: 10.7 G/DL (ref 11.7–15.7)
HGB BLD-MCNC: 10.9 G/DL (ref 11.7–15.7)
HGB BLD-MCNC: 13.7 G/DL (ref 11.7–15.7)
HOLD SPECIMEN: NORMAL
IMM GRANULOCYTES # BLD: 0.1 10E3/UL
IMM GRANULOCYTES NFR BLD: 1 %
INR PPP: 1 (ref 0.85–1.15)
INR PPP: 1.07 (ref 0.85–1.15)
INR PPP: 1.19 (ref 0.85–1.15)
INTERPRETATION ECG - MUSE: NORMAL
LACTATE BLD-SCNC: 3 MMOL/L
LACTATE SERPL-SCNC: 1.3 MMOL/L (ref 0.7–2)
LYMPHOCYTES # BLD AUTO: 0.3 10E3/UL (ref 0.8–5.3)
LYMPHOCYTES NFR BLD AUTO: 2 %
MCH RBC QN AUTO: 29.2 PG (ref 26.5–33)
MCH RBC QN AUTO: 29.5 PG (ref 26.5–33)
MCH RBC QN AUTO: 29.7 PG (ref 26.5–33)
MCHC RBC AUTO-ENTMCNC: 32.2 G/DL (ref 31.5–36.5)
MCHC RBC AUTO-ENTMCNC: 32.4 G/DL (ref 31.5–36.5)
MCHC RBC AUTO-ENTMCNC: 32.5 G/DL (ref 31.5–36.5)
MCV RBC AUTO: 90 FL (ref 78–100)
MCV RBC AUTO: 91 FL (ref 78–100)
MCV RBC AUTO: 92 FL (ref 78–100)
METHADONE UR CFM-MCNC: 166 NG/ML
METHADONE/CREAT UR: 1186 NG/MG {CREAT}
MONOCYTES # BLD AUTO: 1 10E3/UL (ref 0–1.3)
MONOCYTES NFR BLD AUTO: 6 %
MORPHINE UR CFM-MCNC: 724 NG/ML
MORPHINE/CREAT UR: 5171 NG/MG {CREAT}
NEUTROPHILS # BLD AUTO: 14.1 10E3/UL (ref 1.6–8.3)
NEUTROPHILS NFR BLD AUTO: 91 %
NRBC # BLD AUTO: 0 10E3/UL
NRBC BLD AUTO-RTO: 0 /100
OPIATES UR QL SCN: ABNORMAL
P AXIS - MUSE: 73 DEGREES
PCO2 BLDV: 57 MM HG (ref 40–50)
PCP QUAL URINE (ROCHE): ABNORMAL
PH BLDV: 7.3 [PH] (ref 7.32–7.43)
PLATELET # BLD AUTO: 160 10E3/UL (ref 150–450)
PLATELET # BLD AUTO: 169 10E3/UL (ref 150–450)
PLATELET # BLD AUTO: 275 10E3/UL (ref 150–450)
PO2 BLDV: 37 MM HG (ref 25–47)
POTASSIUM SERPL-SCNC: 3.3 MMOL/L (ref 3.4–5.3)
POTASSIUM SERPL-SCNC: 3.3 MMOL/L (ref 3.4–5.3)
POTASSIUM SERPL-SCNC: 3.4 MMOL/L (ref 3.4–5.3)
POTASSIUM SERPL-SCNC: 3.4 MMOL/L (ref 3.4–5.3)
POTASSIUM SERPL-SCNC: 3.5 MMOL/L (ref 3.4–5.3)
POTASSIUM SERPL-SCNC: 3.5 MMOL/L (ref 3.4–5.3)
POTASSIUM SERPL-SCNC: 3.7 MMOL/L (ref 3.4–5.3)
POTASSIUM SERPL-SCNC: 3.8 MMOL/L (ref 3.4–5.3)
POTASSIUM SERPL-SCNC: 3.8 MMOL/L (ref 3.4–5.3)
POTASSIUM SERPL-SCNC: 4 MMOL/L (ref 3.4–5.3)
POTASSIUM SERPL-SCNC: 4 MMOL/L (ref 3.4–5.3)
POTASSIUM SERPL-SCNC: 4.1 MMOL/L (ref 3.4–5.3)
POTASSIUM SERPL-SCNC: 4.3 MMOL/L (ref 3.4–5.3)
POTASSIUM SERPL-SCNC: 4.6 MMOL/L (ref 3.4–5.3)
POTASSIUM SERPL-SCNC: 4.9 MMOL/L (ref 3.4–5.3)
POTASSIUM SERPL-SCNC: 5.5 MMOL/L (ref 3.4–5.3)
PR INTERVAL - MUSE: 136 MS
PROT SERPL-MCNC: 5.6 G/DL (ref 6.4–8.3)
PROT SERPL-MCNC: 5.8 G/DL (ref 6.4–8.3)
PROT SERPL-MCNC: 6 G/DL (ref 6.4–8.3)
PROT SERPL-MCNC: 6.3 G/DL (ref 6.4–8.3)
PROT SERPL-MCNC: 6.4 G/DL (ref 6.4–8.3)
PROT SERPL-MCNC: 6.6 G/DL (ref 6.4–8.3)
PROT SERPL-MCNC: 8.5 G/DL (ref 6.4–8.3)
QRS DURATION - MUSE: 80 MS
QT - MUSE: 342 MS
QTC - MUSE: 406 MS
R AXIS - MUSE: 81 DEGREES
RBC # BLD AUTO: 3.66 10E6/UL (ref 3.8–5.2)
RBC # BLD AUTO: 3.69 10E6/UL (ref 3.8–5.2)
RBC # BLD AUTO: 4.62 10E6/UL (ref 3.8–5.2)
SALICYLATES SERPL-MCNC: <0.3 MG/DL
SAO2 % BLDV: 64 % (ref 94–100)
SODIUM SERPL-SCNC: 132 MMOL/L (ref 136–145)
SODIUM SERPL-SCNC: 133 MMOL/L (ref 136–145)
SODIUM SERPL-SCNC: 134 MMOL/L (ref 136–145)
SODIUM SERPL-SCNC: 135 MMOL/L (ref 136–145)
SODIUM SERPL-SCNC: 136 MMOL/L (ref 136–145)
SODIUM SERPL-SCNC: 136 MMOL/L (ref 136–145)
SODIUM SERPL-SCNC: 139 MMOL/L (ref 135–145)
SODIUM SERPL-SCNC: 141 MMOL/L (ref 135–145)
SODIUM SERPL-SCNC: 142 MMOL/L (ref 135–145)
SODIUM SERPL-SCNC: 143 MMOL/L (ref 135–145)
SYSTOLIC BLOOD PRESSURE - MUSE: NORMAL MMHG
T AXIS - MUSE: 66 DEGREES
VENTRICULAR RATE- MUSE: 85 BPM
WBC # BLD AUTO: 11.9 10E3/UL (ref 4–11)
WBC # BLD AUTO: 15.4 10E3/UL (ref 4–11)
WBC # BLD AUTO: 5.6 10E3/UL (ref 4–11)

## 2023-01-01 PROCEDURE — 70553 MRI BRAIN STEM W/O & W/DYE: CPT

## 2023-01-01 PROCEDURE — 36415 COLL VENOUS BLD VENIPUNCTURE: CPT | Performed by: INTERNAL MEDICINE

## 2023-01-01 PROCEDURE — 99222 1ST HOSP IP/OBS MODERATE 55: CPT | Performed by: NURSE PRACTITIONER

## 2023-01-01 PROCEDURE — 120N000001 HC R&B MED SURG/OB

## 2023-01-01 PROCEDURE — 97110 THERAPEUTIC EXERCISES: CPT | Mod: GP

## 2023-01-01 PROCEDURE — 85027 COMPLETE CBC AUTOMATED: CPT | Performed by: STUDENT IN AN ORGANIZED HEALTH CARE EDUCATION/TRAINING PROGRAM

## 2023-01-01 PROCEDURE — 250N000013 HC RX MED GY IP 250 OP 250 PS 637: Performed by: PSYCHIATRY & NEUROLOGY

## 2023-01-01 PROCEDURE — 250N000013 HC RX MED GY IP 250 OP 250 PS 637: Performed by: NURSE PRACTITIONER

## 2023-01-01 PROCEDURE — 258N000003 HC RX IP 258 OP 636: Performed by: INTERNAL MEDICINE

## 2023-01-01 PROCEDURE — 80346 BENZODIAZEPINES1-12: CPT | Performed by: NURSE PRACTITIONER

## 2023-01-01 PROCEDURE — 83605 ASSAY OF LACTIC ACID: CPT

## 2023-01-01 PROCEDURE — 82803 BLOOD GASES ANY COMBINATION: CPT

## 2023-01-01 PROCEDURE — 250N000013 HC RX MED GY IP 250 OP 250 PS 637: Performed by: STUDENT IN AN ORGANIZED HEALTH CARE EDUCATION/TRAINING PROGRAM

## 2023-01-01 PROCEDURE — 97535 SELF CARE MNGMENT TRAINING: CPT | Mod: GO

## 2023-01-01 PROCEDURE — 80053 COMPREHEN METABOLIC PANEL: CPT | Performed by: NURSE PRACTITIONER

## 2023-01-01 PROCEDURE — 82310 ASSAY OF CALCIUM: CPT | Performed by: NURSE PRACTITIONER

## 2023-01-01 PROCEDURE — 84132 ASSAY OF SERUM POTASSIUM: CPT | Performed by: INTERNAL MEDICINE

## 2023-01-01 PROCEDURE — 82040 ASSAY OF SERUM ALBUMIN: CPT | Performed by: HOSPITALIST

## 2023-01-01 PROCEDURE — 999N000216 HC STATISTIC ADULT CD FACE TO FACE-NO CHRG

## 2023-01-01 PROCEDURE — 80179 DRUG ASSAY SALICYLATE: CPT | Performed by: NURSE PRACTITIONER

## 2023-01-01 PROCEDURE — 36415 COLL VENOUS BLD VENIPUNCTURE: CPT | Performed by: HOSPITALIST

## 2023-01-01 PROCEDURE — 99231 SBSQ HOSP IP/OBS SF/LOW 25: CPT | Performed by: NURSE PRACTITIONER

## 2023-01-01 PROCEDURE — G0463 HOSPITAL OUTPT CLINIC VISIT: HCPCS | Mod: 25

## 2023-01-01 PROCEDURE — 82550 ASSAY OF CK (CPK): CPT | Performed by: NURSE PRACTITIONER

## 2023-01-01 PROCEDURE — 97530 THERAPEUTIC ACTIVITIES: CPT | Mod: GO

## 2023-01-01 PROCEDURE — 80053 COMPREHEN METABOLIC PANEL: CPT | Performed by: EMERGENCY MEDICINE

## 2023-01-01 PROCEDURE — 99285 EMERGENCY DEPT VISIT HI MDM: CPT | Mod: 25

## 2023-01-01 PROCEDURE — 250N000013 HC RX MED GY IP 250 OP 250 PS 637: Performed by: INTERNAL MEDICINE

## 2023-01-01 PROCEDURE — G0463 HOSPITAL OUTPT CLINIC VISIT: HCPCS

## 2023-01-01 PROCEDURE — 120N000013 HC R&B IMCU

## 2023-01-01 PROCEDURE — 82550 ASSAY OF CK (CPK): CPT | Performed by: INTERNAL MEDICINE

## 2023-01-01 PROCEDURE — 80053 COMPREHEN METABOLIC PANEL: CPT | Performed by: INTERNAL MEDICINE

## 2023-01-01 PROCEDURE — 250N000009 HC RX 250: Performed by: INTERNAL MEDICINE

## 2023-01-01 PROCEDURE — 36415 COLL VENOUS BLD VENIPUNCTURE: CPT | Performed by: NURSE PRACTITIONER

## 2023-01-01 PROCEDURE — 90853 GROUP PSYCHOTHERAPY: CPT | Performed by: SOCIAL WORKER

## 2023-01-01 PROCEDURE — 72100 X-RAY EXAM L-S SPINE 2/3 VWS: CPT

## 2023-01-01 PROCEDURE — 258N000003 HC RX IP 258 OP 636: Performed by: EMERGENCY MEDICINE

## 2023-01-01 PROCEDURE — 99233 SBSQ HOSP IP/OBS HIGH 50: CPT | Performed by: STUDENT IN AN ORGANIZED HEALTH CARE EDUCATION/TRAINING PROGRAM

## 2023-01-01 PROCEDURE — 99232 SBSQ HOSP IP/OBS MODERATE 35: CPT | Performed by: INTERNAL MEDICINE

## 2023-01-01 PROCEDURE — 36415 COLL VENOUS BLD VENIPUNCTURE: CPT | Performed by: EMERGENCY MEDICINE

## 2023-01-01 PROCEDURE — A9585 GADOBUTROL INJECTION: HCPCS | Performed by: INTERNAL MEDICINE

## 2023-01-01 PROCEDURE — 97110 THERAPEUTIC EXERCISES: CPT | Mod: GO

## 2023-01-01 PROCEDURE — 258N000003 HC RX IP 258 OP 636: Performed by: HOSPITALIST

## 2023-01-01 PROCEDURE — 250N000011 HC RX IP 250 OP 636: Mod: JZ | Performed by: NURSE PRACTITIONER

## 2023-01-01 PROCEDURE — 72170 X-RAY EXAM OF PELVIS: CPT

## 2023-01-01 PROCEDURE — 82077 ASSAY SPEC XCP UR&BREATH IA: CPT | Performed by: EMERGENCY MEDICINE

## 2023-01-01 PROCEDURE — 71046 X-RAY EXAM CHEST 2 VIEWS: CPT

## 2023-01-01 PROCEDURE — 80307 DRUG TEST PRSMV CHEM ANLYZR: CPT | Performed by: NURSE PRACTITIONER

## 2023-01-01 PROCEDURE — 250N000009 HC RX 250: Performed by: NURSE PRACTITIONER

## 2023-01-01 PROCEDURE — 99233 SBSQ HOSP IP/OBS HIGH 50: CPT | Performed by: INTERNAL MEDICINE

## 2023-01-01 PROCEDURE — 80143 DRUG ASSAY ACETAMINOPHEN: CPT | Performed by: EMERGENCY MEDICINE

## 2023-01-01 PROCEDURE — 99223 1ST HOSP IP/OBS HIGH 75: CPT | Performed by: NURSE PRACTITIONER

## 2023-01-01 PROCEDURE — 82550 ASSAY OF CK (CPK): CPT | Performed by: STUDENT IN AN ORGANIZED HEALTH CARE EDUCATION/TRAINING PROGRAM

## 2023-01-01 PROCEDURE — 73030 X-RAY EXAM OF SHOULDER: CPT | Mod: RT

## 2023-01-01 PROCEDURE — 90791 PSYCH DIAGNOSTIC EVALUATION: CPT | Performed by: SOCIAL WORKER

## 2023-01-01 PROCEDURE — 85610 PROTHROMBIN TIME: CPT | Performed by: INTERNAL MEDICINE

## 2023-01-01 PROCEDURE — 93971 EXTREMITY STUDY: CPT | Mod: RT

## 2023-01-01 PROCEDURE — 85025 COMPLETE CBC W/AUTO DIFF WBC: CPT | Performed by: EMERGENCY MEDICINE

## 2023-01-01 PROCEDURE — 255N000002 HC RX 255 OP 636: Performed by: INTERNAL MEDICINE

## 2023-01-01 PROCEDURE — 999N000157 HC STATISTIC RCP TIME EA 10 MIN

## 2023-01-01 PROCEDURE — 36415 COLL VENOUS BLD VENIPUNCTURE: CPT | Performed by: STUDENT IN AN ORGANIZED HEALTH CARE EDUCATION/TRAINING PROGRAM

## 2023-01-01 PROCEDURE — 99233 SBSQ HOSP IP/OBS HIGH 50: CPT | Performed by: PSYCHIATRY & NEUROLOGY

## 2023-01-01 PROCEDURE — 93005 ELECTROCARDIOGRAM TRACING: CPT

## 2023-01-01 PROCEDURE — 99291 CRITICAL CARE FIRST HOUR: CPT | Performed by: NURSE PRACTITIONER

## 2023-01-01 PROCEDURE — 97116 GAIT TRAINING THERAPY: CPT | Mod: GP

## 2023-01-01 PROCEDURE — 80053 COMPREHEN METABOLIC PANEL: CPT | Performed by: STUDENT IN AN ORGANIZED HEALTH CARE EDUCATION/TRAINING PROGRAM

## 2023-01-01 PROCEDURE — 90853 GROUP PSYCHOTHERAPY: CPT

## 2023-01-01 PROCEDURE — 80179 DRUG ASSAY SALICYLATE: CPT | Performed by: EMERGENCY MEDICINE

## 2023-01-01 PROCEDURE — 258N000003 HC RX IP 258 OP 636: Performed by: NURSE PRACTITIONER

## 2023-01-01 PROCEDURE — 99223 1ST HOSP IP/OBS HIGH 75: CPT | Performed by: PSYCHIATRY & NEUROLOGY

## 2023-01-01 PROCEDURE — 80143 DRUG ASSAY ACETAMINOPHEN: CPT | Performed by: NURSE PRACTITIONER

## 2023-01-01 PROCEDURE — 85610 PROTHROMBIN TIME: CPT | Performed by: HOSPITALIST

## 2023-01-01 PROCEDURE — 250N000011 HC RX IP 250 OP 636: Mod: JZ | Performed by: INTERNAL MEDICINE

## 2023-01-01 PROCEDURE — 82248 BILIRUBIN DIRECT: CPT | Performed by: INTERNAL MEDICINE

## 2023-01-01 PROCEDURE — 84132 ASSAY OF SERUM POTASSIUM: CPT | Performed by: STUDENT IN AN ORGANIZED HEALTH CARE EDUCATION/TRAINING PROGRAM

## 2023-01-01 PROCEDURE — 80348 DRUG SCREENING BUPRENORPHINE: CPT | Performed by: NURSE PRACTITIONER

## 2023-01-01 PROCEDURE — 80307 DRUG TEST PRSMV CHEM ANLYZR: CPT | Performed by: EMERGENCY MEDICINE

## 2023-01-01 PROCEDURE — 97166 OT EVAL MOD COMPLEX 45 MIN: CPT | Mod: GO

## 2023-01-01 PROCEDURE — 97530 THERAPEUTIC ACTIVITIES: CPT | Mod: GP

## 2023-01-01 PROCEDURE — 85027 COMPLETE CBC AUTOMATED: CPT | Performed by: NURSE PRACTITIONER

## 2023-01-01 PROCEDURE — 97162 PT EVAL MOD COMPLEX 30 MIN: CPT | Mod: GP

## 2023-01-01 PROCEDURE — 97750 PHYSICAL PERFORMANCE TEST: CPT | Mod: GP

## 2023-01-01 PROCEDURE — 99239 HOSP IP/OBS DSCHRG MGMT >30: CPT | Performed by: INTERNAL MEDICINE

## 2023-01-01 PROCEDURE — 70450 CT HEAD/BRAIN W/O DYE: CPT

## 2023-01-01 PROCEDURE — 93970 EXTREMITY STUDY: CPT

## 2023-01-01 PROCEDURE — 36415 COLL VENOUS BLD VENIPUNCTURE: CPT

## 2023-01-01 PROCEDURE — 82248 BILIRUBIN DIRECT: CPT | Performed by: NURSE PRACTITIONER

## 2023-01-01 RX ORDER — POTASSIUM CHLORIDE 1500 MG/1
40 TABLET, EXTENDED RELEASE ORAL ONCE
Status: COMPLETED | OUTPATIENT
Start: 2023-01-01 | End: 2023-01-01

## 2023-01-01 RX ORDER — BUPROPION HYDROCHLORIDE 100 MG/1
100 TABLET, EXTENDED RELEASE ORAL
Status: DISCONTINUED | OUTPATIENT
Start: 2023-01-01 | End: 2023-01-01 | Stop reason: HOSPADM

## 2023-01-01 RX ORDER — ALPRAZOLAM 0.25 MG
0.25 TABLET ORAL 2 TIMES DAILY PRN
Status: DISCONTINUED | OUTPATIENT
Start: 2023-01-01 | End: 2023-01-01 | Stop reason: HOSPADM

## 2023-01-01 RX ORDER — GABAPENTIN 300 MG/1
300 CAPSULE ORAL 3 TIMES DAILY
Qty: 90 CAPSULE | Refills: 0 | DISCHARGE
Start: 2023-01-01

## 2023-01-01 RX ORDER — HYDROXYZINE HYDROCHLORIDE 25 MG/1
25 TABLET, FILM COATED ORAL EVERY 6 HOURS
Status: DISCONTINUED | OUTPATIENT
Start: 2023-01-01 | End: 2023-01-01 | Stop reason: HOSPADM

## 2023-01-01 RX ORDER — KETOROLAC TROMETHAMINE 15 MG/ML
15 INJECTION, SOLUTION INTRAMUSCULAR; INTRAVENOUS EVERY 6 HOURS PRN
Status: DISCONTINUED | OUTPATIENT
Start: 2023-01-01 | End: 2023-01-01

## 2023-01-01 RX ORDER — OXYMETAZOLINE HYDROCHLORIDE 0.05 G/100ML
2 SPRAY NASAL 2 TIMES DAILY PRN
Status: DISCONTINUED | OUTPATIENT
Start: 2023-01-01 | End: 2023-01-01

## 2023-01-01 RX ORDER — BUPROPION HYDROCHLORIDE 100 MG/1
100 TABLET, EXTENDED RELEASE ORAL
Qty: 90 TABLET | Refills: 0 | DISCHARGE
Start: 2023-01-01

## 2023-01-01 RX ORDER — METHOCARBAMOL 500 MG/1
500 TABLET, FILM COATED ORAL 3 TIMES DAILY
Qty: 120 TABLET | Refills: 0 | DISCHARGE
Start: 2023-01-01

## 2023-01-01 RX ORDER — GABAPENTIN 300 MG/1
300 CAPSULE ORAL 3 TIMES DAILY
Status: DISCONTINUED | OUTPATIENT
Start: 2023-01-01 | End: 2023-01-01 | Stop reason: HOSPADM

## 2023-01-01 RX ORDER — HYDROXYZINE HYDROCHLORIDE 25 MG/1
25 TABLET, FILM COATED ORAL EVERY 6 HOURS PRN
Status: DISCONTINUED | OUTPATIENT
Start: 2023-01-01 | End: 2023-01-01

## 2023-01-01 RX ORDER — POLYETHYLENE GLYCOL 3350 17 G/17G
17 POWDER, FOR SOLUTION ORAL DAILY
Qty: 510 G | Refills: 0 | DISCHARGE
Start: 2023-01-01

## 2023-01-01 RX ORDER — HYDROXYZINE HYDROCHLORIDE 25 MG/1
50 TABLET, FILM COATED ORAL EVERY 6 HOURS PRN
Status: DISCONTINUED | OUTPATIENT
Start: 2023-01-01 | End: 2023-01-01

## 2023-01-01 RX ORDER — PROCHLORPERAZINE MALEATE 5 MG
5 TABLET ORAL EVERY 6 HOURS PRN
Status: DISCONTINUED | OUTPATIENT
Start: 2023-01-01 | End: 2023-01-01 | Stop reason: HOSPADM

## 2023-01-01 RX ORDER — OXYMETAZOLINE HYDROCHLORIDE 0.05 G/100ML
2 SPRAY NASAL 2 TIMES DAILY PRN
Qty: 15 ML | Refills: 0 | COMMUNITY
Start: 2023-01-01

## 2023-01-01 RX ORDER — ZOLPIDEM TARTRATE 5 MG/1
10 TABLET ORAL AT BEDTIME
Status: DISCONTINUED | OUTPATIENT
Start: 2023-01-01 | End: 2023-01-01

## 2023-01-01 RX ORDER — METHADONE HYDROCHLORIDE 10 MG/1
10 TABLET ORAL EVERY 8 HOURS
Status: COMPLETED | OUTPATIENT
Start: 2023-01-01 | End: 2023-01-01

## 2023-01-01 RX ORDER — SERTRALINE HYDROCHLORIDE 25 MG/1
25 TABLET, FILM COATED ORAL DAILY
Status: DISCONTINUED | OUTPATIENT
Start: 2023-01-01 | End: 2023-01-01

## 2023-01-01 RX ORDER — NALOXONE HYDROCHLORIDE 0.4 MG/ML
0.2 INJECTION, SOLUTION INTRAMUSCULAR; INTRAVENOUS; SUBCUTANEOUS
Status: DISCONTINUED | OUTPATIENT
Start: 2023-01-01 | End: 2023-01-01 | Stop reason: HOSPADM

## 2023-01-01 RX ORDER — ALPRAZOLAM 0.25 MG
0.25 TABLET ORAL 2 TIMES DAILY PRN
Qty: 8 TABLET | Refills: 0 | COMMUNITY
Start: 2023-01-01

## 2023-01-01 RX ORDER — POLYETHYLENE GLYCOL 3350 17 G/17G
17 POWDER, FOR SOLUTION ORAL DAILY
Status: DISCONTINUED | OUTPATIENT
Start: 2023-01-01 | End: 2023-01-01 | Stop reason: HOSPADM

## 2023-01-01 RX ORDER — ONDANSETRON 2 MG/ML
4 INJECTION INTRAMUSCULAR; INTRAVENOUS EVERY 6 HOURS PRN
Status: DISCONTINUED | OUTPATIENT
Start: 2023-01-01 | End: 2023-01-01 | Stop reason: HOSPADM

## 2023-01-01 RX ORDER — LIDOCAINE 50 MG/G
OINTMENT TOPICAL EVERY 4 HOURS PRN
Status: DISCONTINUED | OUTPATIENT
Start: 2023-01-01 | End: 2023-01-01 | Stop reason: HOSPADM

## 2023-01-01 RX ORDER — OXYMETAZOLINE HYDROCHLORIDE 0.05 G/100ML
2 SPRAY NASAL 2 TIMES DAILY
Status: DISCONTINUED | OUTPATIENT
Start: 2023-01-01 | End: 2023-01-01

## 2023-01-01 RX ORDER — ONDANSETRON 4 MG/1
4 TABLET, ORALLY DISINTEGRATING ORAL EVERY 6 HOURS PRN
Status: DISCONTINUED | OUTPATIENT
Start: 2023-01-01 | End: 2023-01-01 | Stop reason: HOSPADM

## 2023-01-01 RX ORDER — SODIUM CHLORIDE, SODIUM LACTATE, POTASSIUM CHLORIDE, CALCIUM CHLORIDE 600; 310; 30; 20 MG/100ML; MG/100ML; MG/100ML; MG/100ML
INJECTION, SOLUTION INTRAVENOUS CONTINUOUS
Status: DISCONTINUED | OUTPATIENT
Start: 2023-01-01 | End: 2023-01-01

## 2023-01-01 RX ORDER — ZOLPIDEM TARTRATE 5 MG/1
10 TABLET ORAL
Status: DISCONTINUED | OUTPATIENT
Start: 2023-01-01 | End: 2023-01-01 | Stop reason: HOSPADM

## 2023-01-01 RX ORDER — OXYCODONE HYDROCHLORIDE 5 MG/1
5 TABLET ORAL EVERY 6 HOURS PRN
Qty: 10 TABLET | Refills: 0 | COMMUNITY
Start: 2023-01-01 | End: 2023-01-01

## 2023-01-01 RX ORDER — BUPROPION HYDROCHLORIDE 100 MG/1
100 TABLET, EXTENDED RELEASE ORAL
Status: DISCONTINUED | OUTPATIENT
Start: 2023-01-01 | End: 2023-01-01

## 2023-01-01 RX ORDER — HYDROXYZINE HYDROCHLORIDE 25 MG/1
50 TABLET, FILM COATED ORAL EVERY 6 HOURS
Status: DISCONTINUED | OUTPATIENT
Start: 2023-01-01 | End: 2023-01-01 | Stop reason: HOSPADM

## 2023-01-01 RX ORDER — HYDROXYZINE HYDROCHLORIDE 25 MG/1
25 TABLET, FILM COATED ORAL EVERY 6 HOURS PRN
COMMUNITY

## 2023-01-01 RX ORDER — OXYCODONE HYDROCHLORIDE 5 MG/1
5 TABLET ORAL EVERY 6 HOURS PRN
Status: DISCONTINUED | OUTPATIENT
Start: 2023-01-01 | End: 2023-01-01 | Stop reason: HOSPADM

## 2023-01-01 RX ORDER — GADOBUTROL 604.72 MG/ML
7 INJECTION INTRAVENOUS ONCE
Status: COMPLETED | OUTPATIENT
Start: 2023-01-01 | End: 2023-01-01

## 2023-01-01 RX ORDER — CEFTRIAXONE 2 G/1
2 INJECTION, POWDER, FOR SOLUTION INTRAMUSCULAR; INTRAVENOUS EVERY 24 HOURS
Status: DISCONTINUED | OUTPATIENT
Start: 2023-01-01 | End: 2023-01-01

## 2023-01-01 RX ORDER — NALOXONE HYDROCHLORIDE 0.4 MG/ML
0.4 INJECTION, SOLUTION INTRAMUSCULAR; INTRAVENOUS; SUBCUTANEOUS
Status: DISCONTINUED | OUTPATIENT
Start: 2023-01-01 | End: 2023-01-01 | Stop reason: HOSPADM

## 2023-01-01 RX ORDER — OXYCODONE HYDROCHLORIDE 5 MG/1
5 TABLET ORAL EVERY 6 HOURS PRN
Qty: 10 TABLET | Refills: 0 | Status: SHIPPED | OUTPATIENT
Start: 2023-01-01

## 2023-01-01 RX ORDER — BISACODYL 10 MG
10 SUPPOSITORY, RECTAL RECTAL DAILY PRN
Status: DISCONTINUED | OUTPATIENT
Start: 2023-01-01 | End: 2023-01-01 | Stop reason: HOSPADM

## 2023-01-01 RX ORDER — NITROGLYCERIN 0.4 MG/1
0.4 TABLET SUBLINGUAL EVERY 5 MIN PRN
Status: DISCONTINUED | OUTPATIENT
Start: 2023-01-01 | End: 2023-01-01 | Stop reason: HOSPADM

## 2023-01-01 RX ORDER — SERTRALINE HYDROCHLORIDE 25 MG/1
25 TABLET, FILM COATED ORAL
Status: DISCONTINUED | OUTPATIENT
Start: 2023-01-01 | End: 2023-01-01

## 2023-01-01 RX ORDER — ACETAMINOPHEN 325 MG/1
975 TABLET ORAL EVERY 8 HOURS
Status: DISCONTINUED | OUTPATIENT
Start: 2023-01-01 | End: 2023-01-01

## 2023-01-01 RX ORDER — HYDROXYZINE HYDROCHLORIDE 25 MG/1
25 TABLET, FILM COATED ORAL EVERY 6 HOURS
Status: CANCELLED | OUTPATIENT
Start: 2023-01-01

## 2023-01-01 RX ORDER — PROCHLORPERAZINE 25 MG
12.5 SUPPOSITORY, RECTAL RECTAL EVERY 12 HOURS PRN
Status: DISCONTINUED | OUTPATIENT
Start: 2023-01-01 | End: 2023-01-01 | Stop reason: HOSPADM

## 2023-01-01 RX ORDER — SODIUM CHLORIDE 9 MG/ML
INJECTION, SOLUTION INTRAVENOUS CONTINUOUS
Status: DISCONTINUED | OUTPATIENT
Start: 2023-01-01 | End: 2023-01-01

## 2023-01-01 RX ORDER — HYDROXYZINE HYDROCHLORIDE 25 MG/1
50 TABLET, FILM COATED ORAL EVERY 6 HOURS
Status: CANCELLED | OUTPATIENT
Start: 2023-01-01

## 2023-01-01 RX ORDER — METHOCARBAMOL 500 MG/1
500 TABLET, FILM COATED ORAL 3 TIMES DAILY
Status: DISCONTINUED | OUTPATIENT
Start: 2023-01-01 | End: 2023-01-01 | Stop reason: HOSPADM

## 2023-01-01 RX ORDER — OXYCODONE HYDROCHLORIDE 5 MG/1
5 TABLET ORAL EVERY 6 HOURS PRN
Status: DISPENSED | OUTPATIENT
Start: 2023-01-01 | End: 2023-01-01

## 2023-01-01 RX ORDER — LIDOCAINE 40 MG/G
CREAM TOPICAL
Status: DISCONTINUED | OUTPATIENT
Start: 2023-01-01 | End: 2023-01-01 | Stop reason: HOSPADM

## 2023-01-01 RX ORDER — GABAPENTIN 300 MG/1
300 CAPSULE ORAL 3 TIMES DAILY
Status: CANCELLED | OUTPATIENT
Start: 2023-01-01

## 2023-01-01 RX ORDER — KETOROLAC TROMETHAMINE 15 MG/ML
15 INJECTION, SOLUTION INTRAMUSCULAR; INTRAVENOUS EVERY 6 HOURS PRN
Status: DISCONTINUED | OUTPATIENT
Start: 2023-01-01 | End: 2023-01-01 | Stop reason: HOSPADM

## 2023-01-01 RX ADMIN — Medication 1 LOZENGE: at 18:17

## 2023-01-01 RX ADMIN — Medication 1 LOZENGE: at 13:51

## 2023-01-01 RX ADMIN — HYDROXYZINE HYDROCHLORIDE 50 MG: 25 TABLET, FILM COATED ORAL at 14:20

## 2023-01-01 RX ADMIN — Medication 1 LOZENGE: at 20:07

## 2023-01-01 RX ADMIN — GABAPENTIN 300 MG: 300 CAPSULE ORAL at 21:48

## 2023-01-01 RX ADMIN — POTASSIUM CHLORIDE 40 MEQ: 1500 TABLET, EXTENDED RELEASE ORAL at 13:19

## 2023-01-01 RX ADMIN — Medication 1 MG: at 00:59

## 2023-01-01 RX ADMIN — KETOROLAC TROMETHAMINE 15 MG: 15 INJECTION, SOLUTION INTRAMUSCULAR; INTRAVENOUS at 21:24

## 2023-01-01 RX ADMIN — METHOCARBAMOL 500 MG: 500 TABLET ORAL at 16:10

## 2023-01-01 RX ADMIN — HYDROXYZINE HYDROCHLORIDE 25 MG: 25 TABLET, FILM COATED ORAL at 03:09

## 2023-01-01 RX ADMIN — GABAPENTIN 300 MG: 300 CAPSULE ORAL at 08:31

## 2023-01-01 RX ADMIN — GLYCERIN, PETROLATUM, PHENYLEPHRINE HCL, PRAMOXINE HCL: 144; 2.5; 10; 15 CREAM TOPICAL at 08:33

## 2023-01-01 RX ADMIN — GLYCERIN, PETROLATUM, PHENYLEPHRINE HCL, PRAMOXINE HCL: 144; 2.5; 10; 15 CREAM TOPICAL at 08:34

## 2023-01-01 RX ADMIN — SERTRALINE HYDROCHLORIDE 25 MG: 25 TABLET ORAL at 08:10

## 2023-01-01 RX ADMIN — ALPRAZOLAM 0.25 MG: 0.25 TABLET ORAL at 06:18

## 2023-01-01 RX ADMIN — Medication 1 LOZENGE: at 12:54

## 2023-01-01 RX ADMIN — KETOROLAC TROMETHAMINE 15 MG: 15 INJECTION, SOLUTION INTRAMUSCULAR; INTRAVENOUS at 03:25

## 2023-01-01 RX ADMIN — HYDROXYZINE HYDROCHLORIDE 50 MG: 25 TABLET, FILM COATED ORAL at 02:51

## 2023-01-01 RX ADMIN — SODIUM CHLORIDE 1000 ML: 9 INJECTION, SOLUTION INTRAVENOUS at 07:32

## 2023-01-01 RX ADMIN — SODIUM CHLORIDE: 9 INJECTION, SOLUTION INTRAVENOUS at 10:34

## 2023-01-01 RX ADMIN — Medication 1 LOZENGE: at 01:00

## 2023-01-01 RX ADMIN — OXYCODONE HYDROCHLORIDE 5 MG: 5 TABLET ORAL at 19:32

## 2023-01-01 RX ADMIN — HYDROXYZINE HYDROCHLORIDE 25 MG: 25 TABLET, FILM COATED ORAL at 13:24

## 2023-01-01 RX ADMIN — GLYCERIN, PETROLATUM, PHENYLEPHRINE HCL, PRAMOXINE HCL: 144; 2.5; 10; 15 CREAM TOPICAL at 21:02

## 2023-01-01 RX ADMIN — SODIUM CHLORIDE: 9 INJECTION, SOLUTION INTRAVENOUS at 05:46

## 2023-01-01 RX ADMIN — METHOCARBAMOL 500 MG: 500 TABLET ORAL at 17:13

## 2023-01-01 RX ADMIN — GABAPENTIN 300 MG: 300 CAPSULE ORAL at 16:17

## 2023-01-01 RX ADMIN — GLYCERIN, PETROLATUM, PHENYLEPHRINE HCL, PRAMOXINE HCL: 144; 2.5; 10; 15 CREAM TOPICAL at 21:49

## 2023-01-01 RX ADMIN — GABAPENTIN 300 MG: 300 CAPSULE ORAL at 08:10

## 2023-01-01 RX ADMIN — CEFTRIAXONE SODIUM 2 G: 2 INJECTION, POWDER, FOR SOLUTION INTRAMUSCULAR; INTRAVENOUS at 18:21

## 2023-01-01 RX ADMIN — CEFTRIAXONE SODIUM 2 G: 2 INJECTION, POWDER, FOR SOLUTION INTRAMUSCULAR; INTRAVENOUS at 17:06

## 2023-01-01 RX ADMIN — HYDROXYZINE HYDROCHLORIDE 50 MG: 25 TABLET, FILM COATED ORAL at 07:52

## 2023-01-01 RX ADMIN — SODIUM CHLORIDE: 9 INJECTION, SOLUTION INTRAVENOUS at 20:33

## 2023-01-01 RX ADMIN — GLYCERIN, PETROLATUM, PHENYLEPHRINE HCL, PRAMOXINE HCL: 144; 2.5; 10; 15 CREAM TOPICAL at 08:45

## 2023-01-01 RX ADMIN — POLYETHYLENE GLYCOL 3350 17 G: 17 POWDER, FOR SOLUTION ORAL at 08:12

## 2023-01-01 RX ADMIN — ALPRAZOLAM 0.25 MG: 0.25 TABLET ORAL at 08:15

## 2023-01-01 RX ADMIN — GADOBUTROL 7 ML: 604.72 INJECTION INTRAVENOUS at 18:38

## 2023-01-01 RX ADMIN — Medication 1 LOZENGE: at 20:33

## 2023-01-01 RX ADMIN — HYDROXYZINE HYDROCHLORIDE 25 MG: 25 TABLET, FILM COATED ORAL at 23:01

## 2023-01-01 RX ADMIN — HYDROXYZINE HYDROCHLORIDE 25 MG: 25 TABLET, FILM COATED ORAL at 13:28

## 2023-01-01 RX ADMIN — GABAPENTIN 300 MG: 300 CAPSULE ORAL at 09:18

## 2023-01-01 RX ADMIN — HYDROXYZINE HYDROCHLORIDE 50 MG: 25 TABLET, FILM COATED ORAL at 14:35

## 2023-01-01 RX ADMIN — LIDOCAINE: 50 OINTMENT TOPICAL at 09:19

## 2023-01-01 RX ADMIN — OXYCODONE HYDROCHLORIDE 5 MG: 5 TABLET ORAL at 20:37

## 2023-01-01 RX ADMIN — HYDROXYZINE HYDROCHLORIDE 50 MG: 25 TABLET, FILM COATED ORAL at 03:25

## 2023-01-01 RX ADMIN — OXYCODONE HYDROCHLORIDE 5 MG: 5 TABLET ORAL at 21:49

## 2023-01-01 RX ADMIN — OXYCODONE HYDROCHLORIDE 5 MG: 5 TABLET ORAL at 16:12

## 2023-01-01 RX ADMIN — SERTRALINE HYDROCHLORIDE 25 MG: 25 TABLET ORAL at 09:18

## 2023-01-01 RX ADMIN — HYDROXYZINE HYDROCHLORIDE 50 MG: 25 TABLET, FILM COATED ORAL at 02:43

## 2023-01-01 RX ADMIN — ALPRAZOLAM 0.25 MG: 0.25 TABLET ORAL at 18:17

## 2023-01-01 RX ADMIN — LIDOCAINE: 50 OINTMENT TOPICAL at 07:39

## 2023-01-01 RX ADMIN — HYDROXYZINE HYDROCHLORIDE 50 MG: 25 TABLET, FILM COATED ORAL at 14:42

## 2023-01-01 RX ADMIN — GLYCERIN, PETROLATUM, PHENYLEPHRINE HCL, PRAMOXINE HCL: 144; 2.5; 10; 15 CREAM TOPICAL at 21:11

## 2023-01-01 RX ADMIN — POLYETHYLENE GLYCOL 3350 17 G: 17 POWDER, FOR SOLUTION ORAL at 08:11

## 2023-01-01 RX ADMIN — Medication 1 LOZENGE: at 16:39

## 2023-01-01 RX ADMIN — OXYMETAZOLINE HYDROCHLORIDE 2 SPRAY: 0.05 SPRAY NASAL at 21:11

## 2023-01-01 RX ADMIN — GABAPENTIN 300 MG: 300 CAPSULE ORAL at 21:12

## 2023-01-01 RX ADMIN — ALPRAZOLAM 0.25 MG: 0.25 TABLET ORAL at 18:14

## 2023-01-01 RX ADMIN — Medication 1 LOZENGE: at 06:51

## 2023-01-01 RX ADMIN — Medication 1 LOZENGE: at 15:24

## 2023-01-01 RX ADMIN — Medication 1 LOZENGE: at 13:21

## 2023-01-01 RX ADMIN — GLYCERIN, PETROLATUM, PHENYLEPHRINE HCL, PRAMOXINE HCL: 144; 2.5; 10; 15 CREAM TOPICAL at 20:39

## 2023-01-01 RX ADMIN — SERTRALINE HYDROCHLORIDE 25 MG: 25 TABLET ORAL at 08:27

## 2023-01-01 RX ADMIN — SODIUM CHLORIDE: 9 INJECTION, SOLUTION INTRAVENOUS at 07:50

## 2023-01-01 RX ADMIN — HYDROXYZINE HYDROCHLORIDE 50 MG: 25 TABLET, FILM COATED ORAL at 08:10

## 2023-01-01 RX ADMIN — Medication 1 LOZENGE: at 12:07

## 2023-01-01 RX ADMIN — SODIUM CHLORIDE: 9 INJECTION, SOLUTION INTRAVENOUS at 05:26

## 2023-01-01 RX ADMIN — OXYCODONE HYDROCHLORIDE 5 MG: 5 TABLET ORAL at 06:15

## 2023-01-01 RX ADMIN — GABAPENTIN 300 MG: 300 CAPSULE ORAL at 17:13

## 2023-01-01 RX ADMIN — METHOCARBAMOL 500 MG: 500 TABLET ORAL at 22:22

## 2023-01-01 RX ADMIN — OXYCODONE HYDROCHLORIDE 5 MG: 5 TABLET ORAL at 06:51

## 2023-01-01 RX ADMIN — GABAPENTIN 300 MG: 300 CAPSULE ORAL at 08:27

## 2023-01-01 RX ADMIN — ZOLPIDEM TARTRATE 10 MG: 5 TABLET ORAL at 21:11

## 2023-01-01 RX ADMIN — BUPROPION HYDROCHLORIDE 100 MG: 100 TABLET, FILM COATED, EXTENDED RELEASE ORAL at 08:10

## 2023-01-01 RX ADMIN — SODIUM CHLORIDE: 9 INJECTION, SOLUTION INTRAVENOUS at 19:53

## 2023-01-01 RX ADMIN — SODIUM CHLORIDE: 9 INJECTION, SOLUTION INTRAVENOUS at 11:05

## 2023-01-01 RX ADMIN — METHOCARBAMOL 500 MG: 500 TABLET ORAL at 08:11

## 2023-01-01 RX ADMIN — SODIUM CHLORIDE: 9 INJECTION, SOLUTION INTRAVENOUS at 02:10

## 2023-01-01 RX ADMIN — HYDROXYZINE HYDROCHLORIDE 25 MG: 25 TABLET, FILM COATED ORAL at 20:34

## 2023-01-01 RX ADMIN — ALPRAZOLAM 0.25 MG: 0.25 TABLET ORAL at 07:52

## 2023-01-01 RX ADMIN — SODIUM CHLORIDE: 9 INJECTION, SOLUTION INTRAVENOUS at 06:00

## 2023-01-01 RX ADMIN — METHOCARBAMOL 500 MG: 500 TABLET ORAL at 21:48

## 2023-01-01 RX ADMIN — GABAPENTIN 300 MG: 300 CAPSULE ORAL at 08:13

## 2023-01-01 RX ADMIN — OXYCODONE HYDROCHLORIDE 5 MG: 5 TABLET ORAL at 08:27

## 2023-01-01 RX ADMIN — GABAPENTIN 300 MG: 300 CAPSULE ORAL at 16:41

## 2023-01-01 RX ADMIN — METHOCARBAMOL 500 MG: 500 TABLET ORAL at 15:44

## 2023-01-01 RX ADMIN — Medication 1 LOZENGE: at 14:51

## 2023-01-01 RX ADMIN — SODIUM CHLORIDE, POTASSIUM CHLORIDE, SODIUM LACTATE AND CALCIUM CHLORIDE: 600; 310; 30; 20 INJECTION, SOLUTION INTRAVENOUS at 16:10

## 2023-01-01 RX ADMIN — SODIUM CHLORIDE: 9 INJECTION, SOLUTION INTRAVENOUS at 22:17

## 2023-01-01 RX ADMIN — SERTRALINE HYDROCHLORIDE 25 MG: 25 TABLET ORAL at 08:12

## 2023-01-01 RX ADMIN — METHOCARBAMOL 500 MG: 500 TABLET ORAL at 08:27

## 2023-01-01 RX ADMIN — METHOCARBAMOL 500 MG: 500 TABLET ORAL at 09:18

## 2023-01-01 RX ADMIN — POLYETHYLENE GLYCOL 3350 17 G: 17 POWDER, FOR SOLUTION ORAL at 08:10

## 2023-01-01 RX ADMIN — SODIUM CHLORIDE: 9 INJECTION, SOLUTION INTRAVENOUS at 01:00

## 2023-01-01 RX ADMIN — KETOROLAC TROMETHAMINE 15 MG: 15 INJECTION, SOLUTION INTRAMUSCULAR; INTRAVENOUS at 11:00

## 2023-01-01 RX ADMIN — OXYCODONE HYDROCHLORIDE 5 MG: 5 TABLET ORAL at 08:06

## 2023-01-01 RX ADMIN — HYDROXYZINE HYDROCHLORIDE 50 MG: 25 TABLET, FILM COATED ORAL at 08:27

## 2023-01-01 RX ADMIN — SODIUM CHLORIDE: 9 INJECTION, SOLUTION INTRAVENOUS at 16:23

## 2023-01-01 RX ADMIN — Medication 1 MG: at 22:33

## 2023-01-01 RX ADMIN — Medication 1 LOZENGE: at 21:12

## 2023-01-01 RX ADMIN — GABAPENTIN 300 MG: 300 CAPSULE ORAL at 15:44

## 2023-01-01 RX ADMIN — ALPRAZOLAM 0.25 MG: 0.25 TABLET ORAL at 11:09

## 2023-01-01 RX ADMIN — OXYCODONE HYDROCHLORIDE 5 MG: 5 TABLET ORAL at 13:28

## 2023-01-01 RX ADMIN — METHOCARBAMOL 500 MG: 500 TABLET ORAL at 08:10

## 2023-01-01 RX ADMIN — OXYMETAZOLINE HYDROCHLORIDE 2 SPRAY: 0.05 SPRAY NASAL at 20:39

## 2023-01-01 RX ADMIN — METHOCARBAMOL 500 MG: 500 TABLET ORAL at 08:13

## 2023-01-01 RX ADMIN — HYDROXYZINE HYDROCHLORIDE 50 MG: 25 TABLET, FILM COATED ORAL at 13:34

## 2023-01-01 RX ADMIN — HYDROXYZINE HYDROCHLORIDE 50 MG: 25 TABLET, FILM COATED ORAL at 14:28

## 2023-01-01 RX ADMIN — POTASSIUM CHLORIDE 40 MEQ: 1500 TABLET, EXTENDED RELEASE ORAL at 07:50

## 2023-01-01 RX ADMIN — LIDOCAINE: 50 OINTMENT TOPICAL at 20:10

## 2023-01-01 RX ADMIN — GABAPENTIN 300 MG: 300 CAPSULE ORAL at 16:12

## 2023-01-01 RX ADMIN — ZOLPIDEM TARTRATE 10 MG: 5 TABLET ORAL at 22:28

## 2023-01-01 RX ADMIN — SODIUM CHLORIDE: 9 INJECTION, SOLUTION INTRAVENOUS at 02:35

## 2023-01-01 RX ADMIN — Medication 1 LOZENGE: at 07:54

## 2023-01-01 RX ADMIN — OXYCODONE HYDROCHLORIDE 5 MG: 5 TABLET ORAL at 09:18

## 2023-01-01 RX ADMIN — Medication 1 LOZENGE: at 09:18

## 2023-01-01 RX ADMIN — ZOLPIDEM TARTRATE 10 MG: 5 TABLET ORAL at 21:49

## 2023-01-01 RX ADMIN — BUPROPION HYDROCHLORIDE 100 MG: 100 TABLET, FILM COATED, EXTENDED RELEASE ORAL at 08:31

## 2023-01-01 RX ADMIN — KETOROLAC TROMETHAMINE 15 MG: 15 INJECTION, SOLUTION INTRAMUSCULAR; INTRAVENOUS at 18:14

## 2023-01-01 RX ADMIN — METHOCARBAMOL 500 MG: 500 TABLET ORAL at 21:17

## 2023-01-01 RX ADMIN — LIDOCAINE: 50 OINTMENT TOPICAL at 08:16

## 2023-01-01 RX ADMIN — OXYCODONE HYDROCHLORIDE 5 MG: 5 TABLET ORAL at 03:13

## 2023-01-01 RX ADMIN — GLYCERIN, PETROLATUM, PHENYLEPHRINE HCL, PRAMOXINE HCL: 144; 2.5; 10; 15 CREAM TOPICAL at 08:12

## 2023-01-01 RX ADMIN — Medication 1 LOZENGE: at 12:48

## 2023-01-01 RX ADMIN — GABAPENTIN 300 MG: 300 CAPSULE ORAL at 20:38

## 2023-01-01 RX ADMIN — GABAPENTIN 300 MG: 300 CAPSULE ORAL at 22:22

## 2023-01-01 RX ADMIN — HYDROXYZINE HYDROCHLORIDE 50 MG: 25 TABLET, FILM COATED ORAL at 08:11

## 2023-01-01 RX ADMIN — SERTRALINE HYDROCHLORIDE 50 MG: 50 TABLET ORAL at 08:11

## 2023-01-01 RX ADMIN — SODIUM CHLORIDE 1000 ML: 9 INJECTION, SOLUTION INTRAVENOUS at 10:43

## 2023-01-01 RX ADMIN — SERTRALINE HYDROCHLORIDE 25 MG: 25 TABLET ORAL at 09:39

## 2023-01-01 RX ADMIN — HYDROXYZINE HYDROCHLORIDE 50 MG: 25 TABLET, FILM COATED ORAL at 08:31

## 2023-01-01 RX ADMIN — GLYCERIN, PETROLATUM, PHENYLEPHRINE HCL, PRAMOXINE HCL: 144; 2.5; 10; 15 CREAM TOPICAL at 20:08

## 2023-01-01 RX ADMIN — OXYMETAZOLINE HYDROCHLORIDE 2 SPRAY: 0.05 SPRAY NASAL at 21:55

## 2023-01-01 RX ADMIN — METHOCARBAMOL 500 MG: 500 TABLET ORAL at 21:12

## 2023-01-01 RX ADMIN — GLYCERIN, PETROLATUM, PHENYLEPHRINE HCL, PRAMOXINE HCL: 144; 2.5; 10; 15 CREAM TOPICAL at 09:20

## 2023-01-01 RX ADMIN — GABAPENTIN 300 MG: 300 CAPSULE ORAL at 21:00

## 2023-01-01 RX ADMIN — ZOLPIDEM TARTRATE 10 MG: 5 TABLET ORAL at 21:24

## 2023-01-01 RX ADMIN — OXYCODONE HYDROCHLORIDE 5 MG: 5 TABLET ORAL at 21:11

## 2023-01-01 RX ADMIN — Medication 1 LOZENGE: at 08:13

## 2023-01-01 RX ADMIN — KETOROLAC TROMETHAMINE 15 MG: 15 INJECTION, SOLUTION INTRAMUSCULAR; INTRAVENOUS at 12:44

## 2023-01-01 RX ADMIN — Medication 1 LOZENGE: at 18:22

## 2023-01-01 RX ADMIN — Medication 1 LOZENGE: at 07:31

## 2023-01-01 RX ADMIN — GLYCERIN, PETROLATUM, PHENYLEPHRINE HCL, PRAMOXINE HCL: 144; 2.5; 10; 15 CREAM TOPICAL at 08:18

## 2023-01-01 RX ADMIN — METHOCARBAMOL 500 MG: 500 TABLET ORAL at 16:12

## 2023-01-01 RX ADMIN — OXYCODONE HYDROCHLORIDE 5 MG: 5 TABLET ORAL at 15:44

## 2023-01-01 RX ADMIN — Medication 1 LOZENGE: at 17:23

## 2023-01-01 RX ADMIN — HYDROXYZINE HYDROCHLORIDE 50 MG: 25 TABLET, FILM COATED ORAL at 02:13

## 2023-01-01 RX ADMIN — POLYETHYLENE GLYCOL 3350 17 G: 17 POWDER, FOR SOLUTION ORAL at 18:21

## 2023-01-01 RX ADMIN — METHOCARBAMOL 500 MG: 500 TABLET ORAL at 20:59

## 2023-01-01 RX ADMIN — HYDROXYZINE HYDROCHLORIDE 50 MG: 25 TABLET, FILM COATED ORAL at 02:11

## 2023-01-01 RX ADMIN — GABAPENTIN 300 MG: 300 CAPSULE ORAL at 16:10

## 2023-01-01 RX ADMIN — GABAPENTIN 300 MG: 300 CAPSULE ORAL at 15:45

## 2023-01-01 RX ADMIN — HYDROXYZINE HYDROCHLORIDE 50 MG: 25 TABLET, FILM COATED ORAL at 21:48

## 2023-01-01 RX ADMIN — HYDROXYZINE HYDROCHLORIDE 25 MG: 25 TABLET, FILM COATED ORAL at 08:11

## 2023-01-01 RX ADMIN — SERTRALINE HYDROCHLORIDE 25 MG: 25 TABLET ORAL at 08:31

## 2023-01-01 RX ADMIN — OXYCODONE HYDROCHLORIDE 5 MG: 5 TABLET ORAL at 02:13

## 2023-01-01 RX ADMIN — METHOCARBAMOL 500 MG: 500 TABLET ORAL at 08:31

## 2023-01-01 RX ADMIN — ALPRAZOLAM 0.25 MG: 0.25 TABLET ORAL at 18:30

## 2023-01-01 RX ADMIN — ZOLPIDEM TARTRATE 10 MG: 5 TABLET ORAL at 22:24

## 2023-01-01 RX ADMIN — Medication 1 LOZENGE: at 19:19

## 2023-01-01 RX ADMIN — SODIUM CHLORIDE 1000 ML: 9 INJECTION, SOLUTION INTRAVENOUS at 06:01

## 2023-01-01 RX ADMIN — METHOCARBAMOL 500 MG: 500 TABLET ORAL at 20:37

## 2023-01-01 RX ADMIN — GABAPENTIN 300 MG: 300 CAPSULE ORAL at 21:17

## 2023-01-01 RX ADMIN — BUPROPION HYDROCHLORIDE 100 MG: 100 TABLET, FILM COATED, EXTENDED RELEASE ORAL at 09:18

## 2023-01-01 RX ADMIN — BUPROPION HYDROCHLORIDE 100 MG: 100 TABLET, FILM COATED, EXTENDED RELEASE ORAL at 13:24

## 2023-01-01 RX ADMIN — METHOCARBAMOL 500 MG: 500 TABLET ORAL at 15:45

## 2023-01-01 RX ADMIN — ALPRAZOLAM 0.25 MG: 0.25 TABLET ORAL at 19:32

## 2023-01-01 RX ADMIN — OXYCODONE HYDROCHLORIDE 5 MG: 5 TABLET ORAL at 13:51

## 2023-01-01 RX ADMIN — Medication 1 LOZENGE: at 20:49

## 2023-01-01 RX ADMIN — HYDROXYZINE HYDROCHLORIDE 50 MG: 25 TABLET, FILM COATED ORAL at 20:59

## 2023-01-01 RX ADMIN — METHOCARBAMOL 500 MG: 500 TABLET ORAL at 16:17

## 2023-01-01 RX ADMIN — BUPROPION HYDROCHLORIDE 100 MG: 100 TABLET, FILM COATED, EXTENDED RELEASE ORAL at 08:27

## 2023-01-01 RX ADMIN — HYDROXYZINE HYDROCHLORIDE 50 MG: 25 TABLET, FILM COATED ORAL at 20:38

## 2023-01-01 RX ADMIN — ZOLPIDEM TARTRATE 10 MG: 5 TABLET ORAL at 20:59

## 2023-01-01 RX ADMIN — GLYCERIN, PETROLATUM, PHENYLEPHRINE HCL, PRAMOXINE HCL: 144; 2.5; 10; 15 CREAM TOPICAL at 20:42

## 2023-01-01 RX ADMIN — SODIUM CHLORIDE: 9 INJECTION, SOLUTION INTRAVENOUS at 12:34

## 2023-01-01 RX ADMIN — OXYCODONE HYDROCHLORIDE 5 MG: 5 TABLET ORAL at 14:20

## 2023-01-01 RX ADMIN — HYDROXYZINE HYDROCHLORIDE 25 MG: 25 TABLET, FILM COATED ORAL at 12:07

## 2023-01-01 RX ADMIN — ACETAMINOPHEN 975 MG: 325 TABLET, FILM COATED ORAL at 18:16

## 2023-01-01 RX ADMIN — Medication 1 LOZENGE: at 21:24

## 2023-01-01 RX ADMIN — LIDOCAINE: 50 OINTMENT TOPICAL at 14:32

## 2023-01-01 RX ADMIN — Medication 1 LOZENGE: at 09:31

## 2023-01-01 RX ADMIN — SODIUM CHLORIDE: 9 INJECTION, SOLUTION INTRAVENOUS at 17:39

## 2023-01-01 RX ADMIN — HYDROXYZINE HYDROCHLORIDE 50 MG: 25 TABLET, FILM COATED ORAL at 20:36

## 2023-01-01 RX ADMIN — METHOCARBAMOL 500 MG: 500 TABLET ORAL at 16:41

## 2023-01-01 RX ADMIN — OXYMETAZOLINE HYDROCHLORIDE 2 SPRAY: 0.05 SPRAY NASAL at 20:09

## 2023-01-01 RX ADMIN — SODIUM CHLORIDE: 9 INJECTION, SOLUTION INTRAVENOUS at 12:10

## 2023-01-01 RX ADMIN — Medication 1 LOZENGE: at 03:48

## 2023-01-01 RX ADMIN — METHOCARBAMOL 500 MG: 500 TABLET ORAL at 20:38

## 2023-01-01 RX ADMIN — LIDOCAINE: 50 OINTMENT TOPICAL at 19:20

## 2023-01-01 RX ADMIN — OXYCODONE HYDROCHLORIDE 5 MG: 5 TABLET ORAL at 20:36

## 2023-01-01 RX ADMIN — GABAPENTIN 300 MG: 300 CAPSULE ORAL at 20:37

## 2023-01-01 RX ADMIN — SODIUM CHLORIDE: 9 INJECTION, SOLUTION INTRAVENOUS at 18:29

## 2023-01-01 RX ADMIN — POTASSIUM CHLORIDE 40 MEQ: 1500 TABLET, EXTENDED RELEASE ORAL at 13:28

## 2023-01-01 RX ADMIN — HYDROXYZINE HYDROCHLORIDE 25 MG: 25 TABLET, FILM COATED ORAL at 18:38

## 2023-01-01 RX ADMIN — LIDOCAINE: 50 OINTMENT TOPICAL at 08:34

## 2023-01-01 RX ADMIN — SERTRALINE HYDROCHLORIDE 25 MG: 25 TABLET ORAL at 13:25

## 2023-01-01 RX ADMIN — OXYCODONE HYDROCHLORIDE 5 MG: 5 TABLET ORAL at 18:22

## 2023-01-01 RX ADMIN — HYDROXYZINE HYDROCHLORIDE 25 MG: 25 TABLET, FILM COATED ORAL at 20:06

## 2023-01-01 RX ADMIN — SODIUM CHLORIDE 1000 ML: 9 INJECTION, SOLUTION INTRAVENOUS at 12:38

## 2023-01-01 ASSESSMENT — ACTIVITIES OF DAILY LIVING (ADL)
ADLS_ACUITY_SCORE: 36
ADLS_ACUITY_SCORE: 20
ADLS_ACUITY_SCORE: 35
ADLS_ACUITY_SCORE: 21
ADLS_ACUITY_SCORE: 35
ADLS_ACUITY_SCORE: 19
ADLS_ACUITY_SCORE: 21
ADLS_ACUITY_SCORE: 20
ADLS_ACUITY_SCORE: 21
ADLS_ACUITY_SCORE: 20
ADLS_ACUITY_SCORE: 35
ADLS_ACUITY_SCORE: 21
ADLS_ACUITY_SCORE: 34
ADLS_ACUITY_SCORE: 21
ADLS_ACUITY_SCORE: 21
ADLS_ACUITY_SCORE: 19
ADLS_ACUITY_SCORE: 21
ADLS_ACUITY_SCORE: 21
ADLS_ACUITY_SCORE: 34
ADLS_ACUITY_SCORE: 34
ADLS_ACUITY_SCORE: 36
ADLS_ACUITY_SCORE: 35
ADLS_ACUITY_SCORE: 20
ADLS_ACUITY_SCORE: 34
ADLS_ACUITY_SCORE: 21
ADLS_ACUITY_SCORE: 40
ADLS_ACUITY_SCORE: 34
ADLS_ACUITY_SCORE: 19
ADLS_ACUITY_SCORE: 34
ADLS_ACUITY_SCORE: 34
DIFFICULTY_EATING/SWALLOWING: NO
ADLS_ACUITY_SCORE: 19
ADLS_ACUITY_SCORE: 36
ADLS_ACUITY_SCORE: 35
ADLS_ACUITY_SCORE: 36
ADLS_ACUITY_SCORE: 34
ADLS_ACUITY_SCORE: 21
ADLS_ACUITY_SCORE: 21
FALL_HISTORY_WITHIN_LAST_SIX_MONTHS: YES
ADLS_ACUITY_SCORE: 19
ADLS_ACUITY_SCORE: 35
ADLS_ACUITY_SCORE: 35
ADLS_ACUITY_SCORE: 34
ADLS_ACUITY_SCORE: 35
NUMBER_OF_TIMES_PATIENT_HAS_FALLEN_WITHIN_LAST_SIX_MONTHS: 1
WALKING_OR_CLIMBING_STAIRS_DIFFICULTY: NO
ADLS_ACUITY_SCORE: 21
ADLS_ACUITY_SCORE: 21
ADLS_ACUITY_SCORE: 36
ADLS_ACUITY_SCORE: 21
ADLS_ACUITY_SCORE: 31
ADLS_ACUITY_SCORE: 20
ADLS_ACUITY_SCORE: 19
ADLS_ACUITY_SCORE: 34
DRESSING/BATHING_DIFFICULTY: NO
ADLS_ACUITY_SCORE: 35
ADLS_ACUITY_SCORE: 21
ADLS_ACUITY_SCORE: 34
ADLS_ACUITY_SCORE: 35
ADLS_ACUITY_SCORE: 34
ADLS_ACUITY_SCORE: 36
ADLS_ACUITY_SCORE: 34
ADLS_ACUITY_SCORE: 35
ADLS_ACUITY_SCORE: 19
ADLS_ACUITY_SCORE: 35
ADLS_ACUITY_SCORE: 21
ADLS_ACUITY_SCORE: 34
ADLS_ACUITY_SCORE: 34
ADLS_ACUITY_SCORE: 36
ADLS_ACUITY_SCORE: 34
ADLS_ACUITY_SCORE: 35
ADLS_ACUITY_SCORE: 21
ADLS_ACUITY_SCORE: 19
ADLS_ACUITY_SCORE: 36
ADLS_ACUITY_SCORE: 35
ADLS_ACUITY_SCORE: 20
ADLS_ACUITY_SCORE: 19
ADLS_ACUITY_SCORE: 34
ADLS_ACUITY_SCORE: 36
DOING_ERRANDS_INDEPENDENTLY_DIFFICULTY: NO
ADLS_ACUITY_SCORE: 21
ADLS_ACUITY_SCORE: 35
ADLS_ACUITY_SCORE: 21
ADLS_ACUITY_SCORE: 21
ADLS_ACUITY_SCORE: 36
TOILETING_ISSUES: NO
ADLS_ACUITY_SCORE: 35
ADLS_ACUITY_SCORE: 21
ADLS_ACUITY_SCORE: 35
ADLS_ACUITY_SCORE: 22
ADLS_ACUITY_SCORE: 20
CHANGE_IN_FUNCTIONAL_STATUS_SINCE_ONSET_OF_CURRENT_ILLNESS/INJURY: NO
ADLS_ACUITY_SCORE: 34
ADLS_ACUITY_SCORE: 19
ADLS_ACUITY_SCORE: 19
ADLS_ACUITY_SCORE: 34
ADLS_ACUITY_SCORE: 21
ADLS_ACUITY_SCORE: 20
ADLS_ACUITY_SCORE: 35
ADLS_ACUITY_SCORE: 19
ADLS_ACUITY_SCORE: 36
ADLS_ACUITY_SCORE: 20
ADLS_ACUITY_SCORE: 21
ADLS_ACUITY_SCORE: 21
ADLS_ACUITY_SCORE: 34
ADLS_ACUITY_SCORE: 22
ADLS_ACUITY_SCORE: 19
ADLS_ACUITY_SCORE: 35
ADLS_ACUITY_SCORE: 20
ADLS_ACUITY_SCORE: 21
ADLS_ACUITY_SCORE: 21
ADLS_ACUITY_SCORE: 20
ADLS_ACUITY_SCORE: 21
ADLS_ACUITY_SCORE: 19
ADLS_ACUITY_SCORE: 35
ADLS_ACUITY_SCORE: 34
ADLS_ACUITY_SCORE: 20
ADLS_ACUITY_SCORE: 21
ADLS_ACUITY_SCORE: 21
ADLS_ACUITY_SCORE: 34
ADLS_ACUITY_SCORE: 34
ADLS_ACUITY_SCORE: 19
WEAR_GLASSES_OR_BLIND: NO
ADLS_ACUITY_SCORE: 19
CONCENTRATING,_REMEMBERING_OR_MAKING_DECISIONS_DIFFICULTY: NO
ADLS_ACUITY_SCORE: 19
ADLS_ACUITY_SCORE: 22
ADLS_ACUITY_SCORE: 36

## 2023-01-01 ASSESSMENT — COLUMBIA-SUICIDE SEVERITY RATING SCALE - C-SSRS
1. IN THE PAST MONTH, HAVE YOU WISHED YOU WERE DEAD OR WISHED YOU COULD GO TO SLEEP AND NOT WAKE UP?: YES
ATTEMPT PAST THREE MONTHS: YES
1. SINCE LAST CONTACT, HAVE YOU WISHED YOU WERE DEAD OR WISHED YOU COULD GO TO SLEEP AND NOT WAKE UP?: NO
5. HAVE YOU STARTED TO WORK OUT OR WORKED OUT THE DETAILS OF HOW TO KILL YOURSELF? DO YOU INTEND TO CARRY OUT THIS PLAN?: YES
6. HAVE YOU EVER DONE ANYTHING, STARTED TO DO ANYTHING, OR PREPARED TO DO ANYTHING TO END YOUR LIFE?: SEE ABOVE
4. HAVE YOU HAD THESE THOUGHTS AND HAD SOME INTENTION OF ACTING ON THEM?: NO
TOTAL  NUMBER OF INTERRUPTED ATTEMPTS LIFETIME: NO
5. HAVE YOU STARTED TO WORK OUT OR WORKED OUT THE DETAILS OF HOW TO KILL YOURSELF? DO YOU INTEND TO CARRY OUT THIS PLAN?: YES
6. HAVE YOU EVER DONE ANYTHING, STARTED TO DO ANYTHING, OR PREPARED TO DO ANYTHING TO END YOUR LIFE?: YES
1. HAVE YOU WISHED YOU WERE DEAD OR WISHED YOU COULD GO TO SLEEP AND NOT WAKE UP?: YES
TOTAL  NUMBER OF PREPARATORY ACTS PAST 3 MONTHS: 1
TOTAL  NUMBER OF ACTUAL ATTEMPTS LIFETIME: 2
2. HAVE YOU ACTUALLY HAD ANY THOUGHTS OF KILLING YOURSELF?: NO
TOTAL  NUMBER OF ABORTED OR SELF INTERRUPTED ATTEMPTS LIFETIME: NO
4. HAVE YOU HAD THESE THOUGHTS AND HAD SOME INTENTION OF ACTING ON THEM?: NO
2. HAVE YOU ACTUALLY HAD ANY THOUGHTS OF KILLING YOURSELF?: YES
TOTAL  NUMBER OF PREPARATORY ACTS LIFETIME: 1
3. HAVE YOU BEEN THINKING ABOUT HOW YOU MIGHT KILL YOURSELF?: YES
TOTAL  NUMBER OF ACTUAL ATTEMPTS PAST 3 MONTHS: 1
2. HAVE YOU ACTUALLY HAD ANY THOUGHTS OF KILLING YOURSELF?: YES
6. HAVE YOU EVER DONE ANYTHING, STARTED TO DO ANYTHING, OR PREPARED TO DO ANYTHING TO END YOUR LIFE?: YES
ATTEMPT LIFETIME: YES

## 2023-01-01 ASSESSMENT — PATIENT HEALTH QUESTIONNAIRE - PHQ9
SUM OF ALL RESPONSES TO PHQ QUESTIONS 1-9: 18
5. POOR APPETITE OR OVEREATING: MORE THAN HALF THE DAYS
SUM OF ALL RESPONSES TO PHQ QUESTIONS 1-9: 16

## 2023-01-01 ASSESSMENT — ANXIETY QUESTIONNAIRES
GAD7 TOTAL SCORE: 13
7. FEELING AFRAID AS IF SOMETHING AWFUL MIGHT HAPPEN: SEVERAL DAYS
IF YOU CHECKED OFF ANY PROBLEMS ON THIS QUESTIONNAIRE, HOW DIFFICULT HAVE THESE PROBLEMS MADE IT FOR YOU TO DO YOUR WORK, TAKE CARE OF THINGS AT HOME, OR GET ALONG WITH OTHER PEOPLE: EXTREMELY DIFFICULT
IF YOU CHECKED OFF ANY PROBLEMS ON THIS QUESTIONNAIRE, HOW DIFFICULT HAVE THESE PROBLEMS MADE IT FOR YOU TO DO YOUR WORK, TAKE CARE OF THINGS AT HOME, OR GET ALONG WITH OTHER PEOPLE: EXTREMELY DIFFICULT
2. NOT BEING ABLE TO STOP OR CONTROL WORRYING: NEARLY EVERY DAY
GAD7 TOTAL SCORE: 13
7. FEELING AFRAID AS IF SOMETHING AWFUL MIGHT HAPPEN: SEVERAL DAYS
1. FEELING NERVOUS, ANXIOUS, OR ON EDGE: MORE THAN HALF THE DAYS
3. WORRYING TOO MUCH ABOUT DIFFERENT THINGS: NEARLY EVERY DAY
GAD7 TOTAL SCORE: 13
6. BECOMING EASILY ANNOYED OR IRRITABLE: SEVERAL DAYS
1. FEELING NERVOUS, ANXIOUS, OR ON EDGE: MORE THAN HALF THE DAYS
GAD7 TOTAL SCORE: 13
6. BECOMING EASILY ANNOYED OR IRRITABLE: MORE THAN HALF THE DAYS
2. NOT BEING ABLE TO STOP OR CONTROL WORRYING: NEARLY EVERY DAY
5. BEING SO RESTLESS THAT IT IS HARD TO SIT STILL: SEVERAL DAYS
4. TROUBLE RELAXING: MORE THAN HALF THE DAYS
3. WORRYING TOO MUCH ABOUT DIFFERENT THINGS: NEARLY EVERY DAY
5. BEING SO RESTLESS THAT IT IS HARD TO SIT STILL: NOT AT ALL

## 2023-01-01 ASSESSMENT — PAIN SCALES - GENERAL
PAINLEVEL: SEVERE PAIN (6)
PAINLEVEL: SEVERE PAIN (6)

## 2023-09-17 ENCOUNTER — HOSPITAL ENCOUNTER (EMERGENCY)
Age: 63
Discharge: HOME | End: 2023-09-17
Attending: EMERGENCY MEDICINE

## 2023-09-17 VITALS
DIASTOLIC BLOOD PRESSURE: 72 MMHG | SYSTOLIC BLOOD PRESSURE: 132 MMHG | OXYGEN SATURATION: 99 % | HEART RATE: 66 BPM | RESPIRATION RATE: 18 BRPM | TEMPERATURE: 97.9 F

## 2023-09-17 DIAGNOSIS — F11.10 OPIATE ABUSE, EPISODIC (CMD): Primary | ICD-10-CM

## 2023-09-17 LAB
ALBUMIN SERPL-MCNC: 4.5 G/DL (ref 3.6–5.1)
ALBUMIN/GLOB SERPL: 1.1 {RATIO} (ref 1–2.4)
ALP SERPL-CCNC: 72 UNITS/L (ref 45–117)
ALT SERPL-CCNC: 20 UNITS/L
AMPHETAMINES UR QL SCN>500 NG/ML: NEGATIVE
ANION GAP SERPL CALC-SCNC: 7 MMOL/L (ref 7–19)
AST SERPL-CCNC: 20 UNITS/L
BARBITURATES UR QL SCN>200 NG/ML: NEGATIVE
BASOPHILS # BLD: 0 K/MCL (ref 0–0.3)
BASOPHILS NFR BLD: 1 %
BENZODIAZ UR QL SCN>200 NG/ML: POSITIVE
BILIRUB SERPL-MCNC: 0.3 MG/DL (ref 0.2–1)
BUN SERPL-MCNC: 4 MG/DL (ref 6–20)
BUN/CREAT SERPL: 7 (ref 7–25)
BZE UR QL SCN>150 NG/ML: NEGATIVE
CALCIUM SERPL-MCNC: 9.3 MG/DL (ref 8.4–10.2)
CANNABINOIDS UR QL SCN>50 NG/ML: NEGATIVE
CHLORIDE SERPL-SCNC: 98 MMOL/L (ref 97–110)
CO2 SERPL-SCNC: 30 MMOL/L (ref 21–32)
CREAT SERPL-MCNC: 0.54 MG/DL (ref 0.51–0.95)
DEPRECATED RDW RBC: 39.2 FL (ref 39–50)
EGFRCR SERPLBLD CKD-EPI 2021: >90 ML/MIN/{1.73_M2}
EOSINOPHIL # BLD: 0.1 K/MCL (ref 0–0.5)
EOSINOPHIL NFR BLD: 1 %
ERYTHROCYTE [DISTWIDTH] IN BLOOD: 12.3 % (ref 11–15)
FASTING DURATION TIME PATIENT: ABNORMAL H
FENTANYL UR QL SCN: NEGATIVE
GLOBULIN SER-MCNC: 4 G/DL (ref 2–4)
GLUCOSE SERPL-MCNC: 74 MG/DL (ref 70–99)
HCT VFR BLD CALC: 37.1 % (ref 36–46.5)
HGB BLD-MCNC: 12.6 G/DL (ref 12–15.5)
IMM GRANULOCYTES # BLD AUTO: 0 K/MCL (ref 0–0.2)
IMM GRANULOCYTES # BLD: 0 %
LYMPHOCYTES # BLD: 1 K/MCL (ref 1–4)
LYMPHOCYTES NFR BLD: 23 %
MCH RBC QN AUTO: 29.5 PG (ref 26–34)
MCHC RBC AUTO-ENTMCNC: 34 G/DL (ref 32–36.5)
MCV RBC AUTO: 86.9 FL (ref 78–100)
MONOCYTES # BLD: 0.4 K/MCL (ref 0.3–0.9)
MONOCYTES NFR BLD: 8 %
NEUTROPHILS # BLD: 3.1 K/MCL (ref 1.8–7.7)
NEUTROPHILS NFR BLD: 67 %
NRBC BLD MANUAL-RTO: 0 /100 WBC
OPIATES UR QL SCN>300 NG/ML: NEGATIVE
PCP UR QL SCN>25 NG/ML: NEGATIVE
PLATELET # BLD AUTO: 242 K/MCL (ref 140–450)
POTASSIUM SERPL-SCNC: 3.7 MMOL/L (ref 3.4–5.1)
PROT SERPL-MCNC: 8.5 G/DL (ref 6.4–8.2)
RBC # BLD: 4.27 MIL/MCL (ref 4–5.2)
SODIUM SERPL-SCNC: 131 MMOL/L (ref 135–145)
WBC # BLD: 4.6 K/MCL (ref 4.2–11)

## 2023-09-17 PROCEDURE — 36415 COLL VENOUS BLD VENIPUNCTURE: CPT

## 2023-09-17 PROCEDURE — 93005 ELECTROCARDIOGRAM TRACING: CPT | Performed by: PHYSICIAN ASSISTANT

## 2023-09-17 PROCEDURE — 85025 COMPLETE CBC W/AUTO DIFF WBC: CPT | Performed by: PHYSICIAN ASSISTANT

## 2023-09-17 PROCEDURE — 80307 DRUG TEST PRSMV CHEM ANLYZR: CPT | Performed by: PHYSICIAN ASSISTANT

## 2023-09-17 PROCEDURE — 99283 EMERGENCY DEPT VISIT LOW MDM: CPT

## 2023-09-17 PROCEDURE — 80053 COMPREHEN METABOLIC PANEL: CPT | Performed by: PHYSICIAN ASSISTANT

## 2023-09-17 ASSESSMENT — LIFESTYLE VARIABLES
ANXIETY: 3
HEADACHE, FULLNESS IN HEAD: MODERATELY SEVERE
AUDITORY DISTURBANCES: NOT PRESENT
AGITATION: NORMAL ACTIVITY
TREMOR: 2
VISUAL DISTURBANCES: NOT PRESENT
NAUSEA AND VOMITING: NO NAUSEA AND NO VOMITING
PAROXYSMAL SWEATS: NO SWEAT VISIBLE

## 2023-09-17 ASSESSMENT — PAIN DESCRIPTION - PAIN TYPE: TYPE: ACUTE PAIN

## 2023-09-17 ASSESSMENT — PAIN SCALES - GENERAL: PAINLEVEL_OUTOF10: 5

## 2023-09-18 LAB
ATRIAL RATE (BPM): 68
P AXIS (DEGREES): 73
PR-INTERVAL (MSEC): 150
QRS-INTERVAL (MSEC): 88
QT-INTERVAL (MSEC): 380
QTC: 404
R AXIS (DEGREES): 82
RAINBOW EXTRA TUBES HOLD SPECIMEN: NORMAL
RAINBOW EXTRA TUBES HOLD SPECIMEN: NORMAL
REPORT TEXT: NORMAL
T AXIS (DEGREES): 62
VENTRICULAR RATE EKG/MIN (BPM): 68

## 2023-09-23 PROBLEM — T14.91XA SUICIDE ATTEMPT (H): Status: ACTIVE | Noted: 2023-01-01

## 2023-09-23 PROBLEM — T50.902A OVERDOSE, INTENTIONAL SELF-HARM, INITIAL ENCOUNTER (H): Status: ACTIVE | Noted: 2023-01-01

## 2023-09-23 PROBLEM — E87.5 HYPERKALEMIA: Status: ACTIVE | Noted: 2023-01-01

## 2023-09-23 PROBLEM — E87.20 LACTIC ACIDOSIS: Status: ACTIVE | Noted: 2023-01-01

## 2023-09-23 PROBLEM — G89.4 CHRONIC PAIN DISORDER: Status: ACTIVE | Noted: 2023-01-01

## 2023-09-23 NOTE — ED NOTES
Received call from Poison Control, status update provided. ASA level was recommended. Provider updated.

## 2023-09-23 NOTE — ED NOTES
Bed: ST01  Expected date:   Expected time:   Means of arrival:   Comments:  Hems 437 53 F unconscious od prescription meds methadone ambien vomiting 154/80  hr 100 rr 20 eta 0957

## 2023-09-23 NOTE — ED NOTES
"Pt  A/O x 4 denied pain or nausea. Pt informed Tiffanie Ferrell, APRN CNP( Admitting Hospital ) that she ingested  9 tablets  of Morphine Extended release 60 mg Tablets and Oxycotin 30 mg 5 Tablets. When asked what time she ingested the pills pt replied \" I do not know\" Poison Control called at [phone],  Jamil was updated. He suggested that pt be observed for 10 to 12 hours, monitor RR and and  administer Narcan as needed and  to obtain Tylenol and ASA levels. Dr Vines and Tiffanie Ferrell, KELBY CNP updated.     "

## 2023-09-23 NOTE — ED TRIAGE NOTES
[facility]  ED Arrival Note      Means of Arrival: EMS  Comes from: Home    Story:Pt was brought in by EMS from her son's home where she was staying with him. Pt last well known  was yesterday at 1930, found this morning by her son on the floor, emesis all over the floor. Pt had 3 empty bottles on the floor with a suicide note. Pt has recently depressed and has chronic condition.         EMS/PD Interventions: PIV  EMS Medications: Narcan 0.5  IV    Meets Stroke Criteria? No  Meets Trauma Criteria? No  Shock Index: N/A      Directed to: Stabilization room  Belongings: Remain with patient

## 2023-09-23 NOTE — H&P
[facility]    History and Physical - Hospitalist Service       Date of Admission:  9/23/2023    Assessment & Plan      Gabriela Gray is a 63 year old female admitted on 9/23/2023  For intentional overdose reportedly on morphine ER 60 mg 9 tablets, OxyContin 30 mg total of 5 tablets and unknown numbers of Ambien tablets.  PMH includes depression, erythromelalgia, pain disorder, foot pain, primary insomnia, history of osteoporosis, history of vitamin D deficiency, history of hypercholesterolemia and generalized anxiety.    Intentional prescription drug overdose  Tremor right upper extremity mild  Hyperkalemia K level 5.5  Hyponatremia sodium level 133  Anion gap 17  Lactic acid POCT 3.0  Elevated AST at 232 previously was at 17.  Elevated ALT 58 previously was 15  Leukocytosis -15.4 consider reactive versus sepsis   Elevated anion gap at 17  Tachycardia  -Took morphine ER 60 mg total of 9 tablets, OxyContin 30 mg total of 5 tablets per her report.  -There was report of patient also took Ambien unspecified.  -Called patient's daughter Sophie and her son Natanael at home.  Not aware of what medications she took.  -Reviewed laboratory data  -Salicylate level less than 0.3; acetaminophen level less than 5.0  -Urine toxicology positive for benzodiazepine, cannabinoid & opiate  -Venous POCT showing hypovolemia/respiratory acidosis  -Chest x-ray negative  -Head CT scan pending  -Plan:  -Admit inpatient general medical  -Hydration with 0.9 normal saline at 100 cc an hour  -Empirical antibiotic with ceftriaxone 2 g IV every 24 hours for elevated lactic acid and leukocytosis possible sepsis  -Recheck in a.m. CMP, CBC and iStat Gases (lactate) venous, POCT  -Per poison control, recheck acetaminophen and salicylate level  -Close monitoring for 10 - 12 hours since admission.  -Check urinalysis  -Suicidal precaution  -Chemical dependency treatment evaluation  -Hospital medicine service to  follow-up with patient tomorrow.    Elevated blood pressure without underlying diagnosis of HTN  -Consider acute drug overdose related  -As needed hydralazine 5 mg IV for systolic blood pressure over 170 every 6 hours as needed.    Oropharynx laceration inthe back of her throat  -Unknown etiology  -Continue to follow-up.    Depression/anxiety/insomnia  -Per patient's family report patient quit taking Zoloft 3 weeks ago.  -Resume home medication.  -Hold Ambien  -Resume hydroxyzine     History of HLD  Vitamin D deficiency  Osteoporosis  -No treatment found.  -Patient can follow-up with her primary doctor, after discharge.         Diet:  Regular  DVT Prophylaxis: Pneumatic Compression Devices  Bragg Catheter: Not present  Lines: None   peripheral line  Cardiac Monitoring: None  Code Status:  Full code, unable to assess patient with active suicidal ideation    Clinically Significant Risk Factors Present on Admission        # Hyperkalemia: Highest K = 5.5 mmol/L in last 2 days, will monitor as appropriate                         Prescription drug overdose  History of chronic pain  Depression  Disposition Plan      Expected Discharge Date: 09/25/2023                The patient's care was discussed with the Attending Physician, Dr. Elena Carver, Bedside Nurse, Patient, and Patient's Family.    KELBY Chavez [facility]  Hospitalist Service  [facility]  Securely message with [employer] (more info)  Text page via [employer] Paging/Directory     ______________________________________________________________________    Chief Complaint   Prescription drug overdose, pain,     History is obtained from the patient  Patient's son and daughter    History of Present Illness   Gabriela Gray is a 63 year old female with PMH of depression, anxiety, insomnia, chronic pain, erythromelalgia, on chronic narcotic pain medication admitted via the emergency department on 9/23/2022 after intentional drug overdose.   "Reportedly patient took morphine ER 60 mg total of 9 tablets, OxyContin 30 mg total 5 tablets in reportedly unknown Ambien tablet.  Reportedly patient was found by her son unresponsive covered with her own vomit.  Narcan was administered by EMS and patient became more alert.     Seen patient for admission history and physical.  Patient is alert and oriented x4 and appears to be in no acute distress.  She is very fatigue and has mild tremor in the right upper extremity.  Patient denies any headache, visual disturbance, nausea, vomiting, shortness of breath, chest pain, fever, chill, sore throat, abdominal pain, burning with urination or confusion.  She complains of dry mouth, fatigue, lower extremity pain chronic.  Patient states she is still suicidal without a plan.  She states the overdose was intentional to end her life and states \"I blew it\".  Per her children's report, patient had a suicidal note indicating that it was the pain problem that she wanted to end her life.      I spoke with patient's daughter Sophie and son Natanael over the phone.  They were not aware of her suicidal ideation.  They are not aware of how many medication she was taking.  No history of alcohol use disorder or substance use disorder. Her daughter Sophie reported patient had stopped taking Zoloft 3 weeks ago.  They are not aware of any abuse or safety issue.  Reviewed care everywhere electronic medical record and noted patient was seen by her primary doctor August 2023.    Patient's plan of care discussed with Dr. Elena Carver, patient, patient's children and nursing staff.  Patient will be admitted inpatient for status post prescription drug overdose.      Past Medical History    Past Medical History:   Diagnosis Date    Depression     Erythromelalgia (H)        Past Surgical History   No past surgical history on file.    Prior to Admission Medications   Prior to Admission Medications   Prescriptions Last Dose Informant Patient " Reported? Taking?   hydrOXYzine (ATARAX) 25 MG tablet Unknown at PRN Self Yes Yes   Sig: Take 25 mg by mouth every 6 hours as needed for anxiety   sertraline (ZOLOFT) 25 MG tablet 9/22/2023 Self Yes Yes   Sig: Take 25 mg by mouth daily   zolpidem (AMBIEN) 10 MG tablet 9/22/2023 at took 3 Self Yes Yes   Sig: Take 10 mg by mouth At Bedtime      Facility-Administered Medications: None        Review of Systems    The 10 point Review of Systems is negative other than noted in the HPI or here.     Social History   I have reviewed this patient's social history and updated it with pertinent information if needed.  Social History     Tobacco Use    Smoking status: Never    Smokeless tobacco: Never         Family History     No significant family history, including no history of: Discussed with patient's daughter and son regarding background and states patient was on methadone.  Patient has chronic pain that has not been under control.        Allergies   No Known Allergies     Physical Exam   Vital Signs: Temp: 97.8  F (36.6  C) Temp src: Temporal BP: 127/74 Pulse: 90   Resp: 27 SpO2: 100 % O2 Device: None (Room air)    Weight: 144 lbs 2.89 oz    Constitutional: awake, alert, cooperative, no apparent distress, and appears stated age  Eyes: lids and lashes normal, pupils equal, round and reactive to light, sclera clear, and conjunctiva normal  ENT: Black substance stain in the nasal cavity noted presumed secondary to Narcan administration.  Oropharynx noted erythremia and laceration in the back of the throat.  Hematologic / Lymphatic: no cervical lymphadenopathy  Respiratory: No increased work of breathing, good air exchange, clear to auscultation bilaterally, no crackles or wheezing  Cardiovascular: Tachycardia noted.  RRR.  Circulation is intact.  No pitting edema noted lower extremity.  GI: No scars, normal bowel sounds, soft, non-distended, non-tender, no masses palpated, no hepatosplenomegally  Skin: No acute rash noted  on exposed skin.  Warm and dry.  Lower extremity noted dry skin and redness.  No open skin noted.  Musculoskeletal: no lower extremity pitting edema present  Neurologic: Alert oriented x3.  Noted right upper extremity tremor.  Slow speech and fatigue.  Neuropsychiatric: Affect is interactive, maintains appropriate eye contact.  No agitation noted.    Medical Decision Making       60 MINUTES SPENT BY ME on the date of service doing chart review, history, exam, documentation & further activities per the note.      Data     I have personally reviewed the following data over the past 24 hrs:    15.4 (H)  \   13.7   / 275     133 (L) 94 (L) 18.1 /  114 (H)   5.5 (H) 22 0.95 \     ALT: 58 (H) AST: 232 (H) AP: 82 TBILI: 0.2   ALB: 4.7 TOT PROTEIN: 8.5 (H) LIPASE: N/A     Procal: N/A CRP: N/A Lactic Acid: 1.3         Imaging results reviewed over the past 24 hrs:   Recent Results (from the past 24 hour(s))   Chest XR,  PA & LAT    Narrative    EXAM: XR CHEST 2 VIEWS  LOCATION: [facility]  DATE/TIME: 9/23/2023 10:37 AM CDT    INDICATION: overdose, vomiting  COMPARISON: None.      Impression    IMPRESSION:     The cardiac silhouette is normal in size. Hilar and mediastinal interfaces are normal.    The lungs are symmetrically inflated. There are no airspace or interstitial opacities.    Diaphragm curvature is normal. No pleural fluid is present.    Small thoracic spine degenerative osteophytes are present.      Head CT w/o contrast    Narrative    EXAM: CT HEAD WITHOUT CONTRAST  LOCATION: [facility]  DATE: 09/23/2023    INDICATION: Overdose, found down.  COMPARISON: None.  TECHNIQUE: Routine CT Head without IV contrast. Multiplanar reformats. Dose reduction techniques were used.    FINDINGS:  INTRACRANIAL CONTENTS: No finding for intracranial hemorrhage, mass, or acute infarct. Ventricles are normal in size. Normal gray-white matter differentiation. No mass effect or midline  shift.    Tips of the cerebellar tonsils are excluded from the volume imaged. Sella is unremarkable for technique. Corpus callosum is normally formed.    VISUALIZED ORBITS/SINUSES/MASTOIDS: Prior cataract surgeries. Mild right anterior ethmoid sinus mucosal thickening. Middle ear cavities and mastoid air cells are clear.    BONES/SOFT TISSUES: Calvarium is intact, without suspicious lytic or blastic foci.      Impression    IMPRESSION:  1.  No finding for intracranial hemorrhage, mass, or acute infarct.         Recent Labs   Lab 09/23/23  1009   WBC 15.4*   HGB 13.7   MCV 92      *   POTASSIUM 5.5*   CHLORIDE 94*   CO2 22   BUN 18.1   CR 0.95   ANIONGAP 17*   BRADY 9.3   *   ALBUMIN 4.7   PROTTOTAL 8.5*   BILITOTAL 0.2   ALKPHOS 82   ALT 58*   *

## 2023-09-23 NOTE — ED NOTES
[facility]  ED Nurse Handoff Report    ED Chief complaint: Drug Overdose      ED Diagnosis:   Final diagnoses:   Overdose, intentional self-harm, initial encounter (H)   Suicide attempt (H)   Chronic pain disorder   Lactic acidosis   Hyperkalemia       Code Status: Full Code    Allergies: No Known Allergies    Patient Story: Pt is a 63 year old female presenting via EMS after intentional overdose on Ambien and morphine.  The patient's son states he last saw her at 7:30 PM.  He found her this morning around 9 AM lying facedown on the ground covered in vomit.  He found a suicide note.  The patient suffers from a chronic pain disorder.  She endorses taking Ambien and morphine last night to end her life because she cannot deal with the pain any longer.  EMS administered Narcan, and the patient became more alert.  She currently endorses pain all over.  The patient's son states that she recently moved in with him from [place].  He was concerned that she was stashing morphine and methadone.  He states she was planning on checking in to [facility] on Monday.     Focused Assessment:  Pt is awake, alert and orientated X 3    Treatments and/or interventions provided: Labs, Xray      Patient's response to treatments and/or interventions: Pt tolerated well     To be done/followed up on inpatient unit:      Does this patient have any cognitive concerns?:  AOX 3    Activity level - Baseline/Home:  Independent  Activity Level - Current:   Independent    Patient's Preferred language: English   Needed?: No    Isolation: None  Infection: Not Applicable  Patient tested for COVID 19 prior to admission: NO  Bariatric?: No    Vital Signs:   Vitals:    09/23/23 1022 09/23/23 1039 09/23/23 1100 09/23/23 1106   BP: (!) 146/84  (!) 152/75    Pulse: 86  85 86   Resp:  18  10   Temp:       TempSrc:       SpO2: 93% 98%  97%   Weight:           Cardiac Rhythm:     Was the PSS-3 completed:   No -unable to complete  What  interventions are required if any?               Family Comments: Son and  at bedside   OBS brochure/video discussed/provided to patient/family: No              Name of person given brochure if not patient:               Relationship to patient:     For the majority of the shift this patient's behavior was Yellow.   Behavioral interventions performed were .    ED NURSE PHONE NUMBER: RN 3

## 2023-09-23 NOTE — ED PROVIDER NOTES
History     Chief Complaint:  Drug Overdose       HPI   Gabriela Gray is a 63 year old female presenting via EMS after intentional overdose on Ambien and morphine.  The patient's son states he last saw her at 7:30 PM.  He found her this morning around 9 AM lying facedown on the ground covered in vomit.  He found a suicide note.  The patient suffers from a chronic pain disorder.  She endorses taking Ambien and morphine last night to end her life because she cannot deal with the pain any longer.  EMS administered Narcan, and the patient became more alert.  She currently endorses pain all over.  The patient's son states that she recently moved in with him from [place].  He was concerned that she was stashing morphine and methadone.  He states she was planning on checking in to [facility] on Monday.      Independent Historian:    EMS and son - see above    Review of External Notes:  NA      Medications:    hydrOXYzine (ATARAX) 25 MG tablet  sertraline (ZOLOFT) 25 MG tablet  zolpidem (AMBIEN) 10 MG tablet        Past Medical History:    Past Medical History:   Diagnosis Date    Depression     Erythromelalgia (H)        Past Surgical History:    No past surgical history on file.       Physical Exam   Patient Vitals for the past 24 hrs:   BP Temp Temp src Pulse Resp SpO2 Weight   09/23/23 1500 127/64 -- -- 93 16 94 % --   09/23/23 1430 (!) 146/68 -- -- 97 (!) 34 96 % --   09/23/23 1416 -- -- -- 93 15 98 % --   09/23/23 1415 (!) 144/68 -- -- 96 16 (!) 83 % --   09/23/23 1401 -- -- -- -- 15 -- --   09/23/23 1400 (!) 155/78 -- -- 98 12 100 % --   09/23/23 1345 (!) 106/93 -- -- 95 17 90 % --   09/23/23 1300 117/60 -- -- 89 22 98 % --   09/23/23 1209 -- 97.8  F (36.6  C) Temporal 90 27 100 % --   09/23/23 1200 -- -- -- -- -- 100 % --   09/23/23 1157 127/74 -- -- 94 23 98 % --   09/23/23 1106 -- -- -- 86 10 97 % --   09/23/23 1100 (!) 152/75 97.8  F (36.6  C) Temporal 86 20 95 % --   09/23/23 1039 -- -- -- -- 18 98 %  "--   09/23/23 1022 (!) 146/84 -- -- 86 -- 93 % --   09/23/23 1011 -- 97.9  F (36.6  C) Temporal -- -- -- --   09/23/23 1008 -- -- -- -- -- -- 65.4 kg (144 lb 2.9 oz)   09/23/23 1007 -- -- -- 89 17 100 % --   09/23/23 1005 -- -- -- 87 17 99 % --   09/23/23 1003 (!) 175/99 -- -- 86 -- -- --        Physical Exam  General: Resting on the bed.  Head: No obvious trauma to head.  Ears, Nose, Throat:  External ears normal.  Nose normal.  No pharyngeal erythema, swelling or exudate.  Midline uvula. Moist mucus membranes. Emesis in and around the mouth  Eyes: Pupils 3 mm equal and reactive.  EOMI.  Neck: Normal range of motion.  Neck supple.   CV: Regular rate and rhythm.  No murmurs.      Respiratory: Effort normal and breath sounds normal.  No wheezing or crackles.   Gastrointestinal: Soft.  No distension. There is no tenderness.  There is no rigidity, no rebound and no guarding.   Musculoskeletal: Normal range of motion.  Non tender extremities to palpations.    Neuro: Alert. Moving all extremities appropriately.  Normal speech.    Skin: Skin is warm and dry.  No rash noted.   Psych: Suicidal, repeating \"I want to die.\"    Emergency Department Course   ECG  ECG results from 09/23/23   EKG 12 lead     Value    Systolic Blood Pressure     Diastolic Blood Pressure     Ventricular Rate 85    Atrial Rate 85    NH Interval 136    QRS Duration 80        QTc 406    P Axis 73    R AXIS 81    T Axis 66    Interpretation ECG      Sinus rhythm  No ischemic changes, narrow QRS complex  No previous ECGs available           Imaging:  Head CT w/o contrast   Final Result   IMPRESSION:   1.  No finding for intracranial hemorrhage, mass, or acute infarct.            Chest XR,  PA & LAT   Final Result   IMPRESSION:       The cardiac silhouette is normal in size. Hilar and mediastinal interfaces are normal.      The lungs are symmetrically inflated. There are no airspace or interstitial opacities.      Diaphragm curvature is normal. No " pleural fluid is present.      Small thoracic spine degenerative osteophytes are present.            Report per radiology    Laboratory:  Labs Ordered and Resulted from Time of ED Arrival to Time of ED Departure   COMPREHENSIVE METABOLIC PANEL - Abnormal       Result Value    Sodium 133 (*)     Potassium 5.5 (*)     Chloride 94 (*)     Carbon Dioxide (CO2) 22      Anion Gap 17 (*)     Urea Nitrogen 18.1      Creatinine 0.95      Calcium 9.3      Glucose 114 (*)     Alkaline Phosphatase 82       (*)     ALT 58 (*)     Protein Total 8.5 (*)     Albumin 4.7      Bilirubin Total 0.2      GFR Estimate 66     ACETAMINOPHEN LEVEL - Abnormal    Acetaminophen <5.0 (*)    CBC WITH PLATELETS AND DIFFERENTIAL - Abnormal    WBC Count 15.4 (*)     RBC Count 4.62      Hemoglobin 13.7      Hematocrit 42.6      MCV 92      MCH 29.7      MCHC 32.2      RDW 12.0      Platelet Count 275      % Neutrophils 91      % Lymphocytes 2      % Monocytes 6      % Eosinophils 0      % Basophils 0      % Immature Granulocytes 1      NRBCs per 100 WBC 0      Absolute Neutrophils 14.1 (*)     Absolute Lymphocytes 0.3 (*)     Absolute Monocytes 1.0      Absolute Eosinophils 0.0      Absolute Basophils 0.0      Absolute Immature Granulocytes 0.1      Absolute NRBCs 0.0     ISTAT GASES LACTATE VENOUS POCT - Abnormal    Lactic Acid POCT 3.0 (*)     Bicarbonate Venous POCT 28      O2 Sat, Venous POCT 64 (*)     pCO2 Venous POCT 57 (*)     pH Venous POCT 7.30 (*)     pO2 Venous POCT 37     DRUG ABUSE SCREEN QUAL URINE - Abnormal    Amphetamines Urine Screen Negative      Barbituates Urine Screen Negative      Benzodiazepine Urine Screen Positive (*)     Cannabinoids Urine Screen Positive (*)     Cocaine Urine Screen Negative      Fentanyl Qual Urine Screen Negative      Opiates Urine Screen Positive (*)     PCP Urine Screen Negative     SALICYLATE LEVEL - Normal    Salicylate <0.3     ETHYL ALCOHOL LEVEL - Normal    Alcohol ethyl <0.01     LACTIC  ACID WHOLE BLOOD - Normal    Lactic Acid 1.3     SALICYLATE LEVEL - Normal    Salicylate <0.3          Procedures   NA    Emergency Department Course & Assessments:             Interventions:  Medications   hydrOXYzine (ATARAX) tablet 25 mg (has no administration in time range)   sertraline (ZOLOFT) tablet 25 mg (has no administration in time range)   melatonin tablet 1 mg (has no administration in time range)   lactated ringers infusion (has no administration in time range)   acetaminophen (TYLENOL) tablet 975 mg (has no administration in time range)   polyethylene glycol (MIRALAX) Packet 17 g (has no administration in time range)   bisacodyl (DULCOLAX) suppository 10 mg (has no administration in time range)   magnesium hydroxide (MILK OF MAGNESIA) suspension 30 mL (has no administration in time range)   ondansetron (ZOFRAN ODT) ODT tab 4 mg (has no administration in time range)     Or   ondansetron (ZOFRAN) injection 4 mg (has no administration in time range)   prochlorperazine (COMPAZINE) injection 5 mg (has no administration in time range)     Or   prochlorperazine (COMPAZINE) tablet 5 mg (has no administration in time range)     Or   prochlorperazine (COMPAZINE) suppository 12.5 mg (has no administration in time range)   sodium chloride 0.9% BOLUS 1,000 mL (0 mLs Intravenous Stopped 9/23/23 1236)   sodium chloride 0.9% BOLUS 1,000 mL (0 mLs Intravenous Stopped 9/23/23 1528)        Assessments:  0956 -initial evaluation and assessment of patient    Independent Interpretation (X-rays, CTs, rhythm strip):  Head CT negative for intracranial hemorrhage  Chest x-ray negative for consolidation, pleural effusion, pneumothorax    Consultations/Discussion of Management or Tests:  1105 - I spoke to Dr. Parker, hospitalist, who [place] [facility] [place]       Social Determinants of Health affecting care:  Depression and suicidality     Disposition:  The patient was admitted to the hospital under the care of   Elena.     Impression & Plan      Medical Decision Making:  Patient is alert and oriented upon arrival.  She endorses chronic pain and current suicidality.  She repeatedly states that she wants to die.  Vital signs are stable.  She reports taking morphine and Ambien last night.  She does not specify how much and at what time.  EKG shows no ischemic changes.  Lactate is 3.0.  She is given 1 L normal saline bolus and her lactate improved to 1.3.  She does have a leukocytosis.  At the moment there is no concern for infection, she is afebrile with no infectious symptoms.  The leukocytosis could be secondary to vomiting.  She is hyperkalemic.  LFTs are elevated.  Tylenol, salicylate and alcohol levels are negative.  She remains in stable condition.  She will need further monitoring throughout the night.  She is placed on a one-to-one.  I discussed the patient with Dr. Parker, the hospitalist, and she agrees to admit the patient to [place] in the [facility] [place]      Diagnosis:    ICD-10-CM    1. Overdose, intentional self-harm, initial encounter (H)  T50.902A       2. Suicide attempt (H)  T14.91XA       3. Chronic pain disorder  G89.4       4. Lactic acidosis  E87.20       5. Hyperkalemia  E87.5            Discharge Medications:  New Prescriptions    No medications on file          Kalpesh Vines MD  9/23/2023   Kalpesh Vines MD Peery, Stephen, MD  09/23/23 1069

## 2023-09-24 NOTE — PROGRESS NOTES
[facility]    Medicine Progress Note - Hospitalist Service    Date of Admission:  9/23/2023    Assessment & Plan      Gabriela Gray is a 63 year old female admitted on 9/23/2023  For intentional overdose reportedly on morphine ER 60 mg 9 tablets, OxyContin 30 mg total of 5 tablets and unknown numbers of Ambien tablets.  PMH includes depression, erythromelalgia, pain disorder, foot pain, primary insomnia, history of osteoporosis, history of vitamin D deficiency, history of hypercholesterolemia and generalized anxiety.    Intentional prescription drug overdose  Tremor right upper extremity mild  Hyperkalemia K level 5.5  Hyponatremia sodium level 133  Anion gap 17  Lactic acid POCT 3.0  Acute liver injury, in a hepatotoxic pattern, likely acute drug liver injury  Elevated AST at 232 previously was at 17.  Elevated ALT 58 previously was 15  Leukocytosis -15.4 consider reactive versus sepsis   Elevated anion gap at 17  Tachycardia  -Took morphine ER 60 mg total of 9 tablets, OxyContin 30 mg total of 5 tablets per her report.  -There was report of patient also took Ambien unspecified.  -Called patient's daughter Sophie and her son Natanael at home.  Not aware of what medications she took.  -Reviewed laboratory data  -Salicylate level less than 0.3; acetaminophen level less than 5.0  -Urine toxicology positive for benzodiazepine, cannabinoid & opiate  -Venous POCT showing hypovolemia/respiratory acidosis  -Chest x-ray negative  -Head CT scan pending  -Plan:  -Admit inpatient general medical  -Hydration with 0.9 normal saline at 100 cc an hour  -Empirical antibiotic with ceftriaxone 2 g IV every 24 hours for elevated lactic acid and leukocytosis possible sepsis  -Recheck in a.m. CMP, CBC and iStat Gases (lactate) venous, POCT  -Per poison control, recheck acetaminophen and salicylate level  -Close monitoring for 10 - 12 hours since admission.  -Check urinalysis  -Suicidal  precaution  -Chemical dependency treatment evaluation  -Hospital medicine service to follow-up with patient tomorrow.    Elevated blood pressure without underlying diagnosis of HTN  -Consider acute drug overdose related  -As needed hydralazine 5 mg IV for systolic blood pressure over 170 every 6 hours as needed.    Oropharynx laceration inthe back of her throat  -Unknown etiology  -Continue to follow-up.    Depression/anxiety/insomnia  -Per patient's family report patient quit taking Zoloft 3 weeks ago.  -Resume home medication.  -Hold Ambien  -Resume hydroxyzine     History of HLD  Vitamin D deficiency  Osteoporosis  -No treatment found.  -Patient can follow-up with her primary doctor, after discharge.         Diet: Combination Diet Regular Diet Adult    DVT Prophylaxis: Pneumatic Compression Devices  Bragg Catheter: Not present  Lines: None     Cardiac Monitoring: ACTIVE order. Indication: Drug overdose (24 hours)  Code Status: Full Code      Clinically Significant Risk Factors Present on Admission        # Hyperkalemia: Highest K = 5.5 mmol/L in last 2 days, will monitor as appropriate                           Disposition Plan      Expected Discharge Date: 09/27/2023                  Jamal Govea MD  Hospitalist Service  [facility]  Securely message with [employer] (more info)  Text page via [employer] Paging/Directory   ______________________________________________________________________    Interval History   Feels same, no new complaints    Physical Exam   Vital Signs: Temp: 97.4  F (36.3  C) Temp src: Oral BP: 127/57 Pulse: 85   Resp: 11 SpO2: 98 % O2 Device: None (Room air) Oxygen Delivery: 2 LPM  Weight: 144 lbs 2.89 oz        Medical Decision Making

## 2023-09-24 NOTE — CONSULTS
[facility]    Orthopedic Consultation    Gabriela Gray MRN# [id number]   Age: 63 year old YOB: 1960     Date of Admission:  9/23/2023    Reason for consult: Rhabdomyolysis, prolonged downtime with limited right shoulder and right hip ROM        Requesting physician: José Miguel Lobo CNP, Rapid response team        Level of consult: Consult, follow and place orders           Assessment and Plan:   Assessment:   Rhabdomyolysis, CK >22,000        Plan:   Gabriela is a pleasant 63 year old female with global right sided pain and weakness after unknown downtime from suicide attempt with overdose, details below. CK found to be >22,000. RRT ordered XR of pelvis and shoulder with no definite fracture identified. CT suggested for shoulder, upon discussion with Gabriela this morning she declines CT shoulder at this time. Overall, Gabriela endorses improvement in her mobility since IV fluids were started for rhabdo. She actively dorsi and plantar flexes without significant pain, or increased pain. Tolerates passive ROM of lower extremity. Resting comfortably in bed. No active concerns for compartment syndrome at this time. Actively forward flexes and abducts right shoulder as well with ROM intact throughout right elbow and wrist. Reduced extension of the digits of right hand, makes a fist and extends but not fully. No pain with passive movements. Gabriela states possible CVA was mentioned to her. Compartments soft. We will continue to monitor as there is risk for development of compartment syndrome with IV fluid treatment in setting of rhabdo.     My impression is that limited mobility and pain was due to elevated CK, and with treatment we will see improvement and resolve of symptoms.     Please contact orthopedic trauma team if any questions or concerns arise.           Chief Complaint:   Right upper and lower extremity pain after prolonged downtime and elevated CK           History of Present Illness:   This patient is a 63 year old female who presents with the following condition requiring a hospital admission:   Hyperkalemia 9/23/2023      Lactic acidosis 9/23/2023      Suicide attempt (H) 9/23/2023      Chronic pain disorder 9/23/2023      Overdose, intentional self-harm, initial encounter (H) 9/23/2023         Gabriela Gray is a 63 year old female admitted on 9/23/2023 For intentional overdose reportedly on morphine ER 60 mg 9 tablets, OxyContin 30 mg total of 5 tablets and unknown numbers of Ambien tablets. PMH includes depression, erythromelalgia, pain disorder, foot pain, primary insomnia, history of osteoporosis, history of vitamin D deficiency, history of hypercholesterolemia and generalized anxiety. Rapid response team consulted at 4:45 am and found to have decreased ability to range right side globally. Pelvic and shoulder XR obtained with no definitive fracture. CK found to be greater than 22,000 consistent with rhabdomyolysis.             Past Medical History:     Past Medical History:   Diagnosis Date    Depression     Erythromelalgia (H)              Past Surgical History:   No past surgical history on file.          Social History:     Social History     Tobacco Use    Smoking status: Never    Smokeless tobacco: Never   Substance Use Topics    Alcohol use: Not on file             Family History:   No family history on file.          Immunizations:     VACCINE/DOSE   Diptheria   DPT   DTAP   HBIG   Hepatitis A   Hepatitis B   HIB   Influenza   Measles   Meningococcal   MMR   Mumps   Pneumococcal   Polio   Rubella   Small Pox   TDAP   Varicella   Zoster             Allergies:   No Known Allergies          Medications:     Current Facility-Administered Medications   Medication    bisacodyl (DULCOLAX) suppository 10 mg    cefTRIAXone (ROCEPHIN) 2 g vial to attach to  ml bag for ADULTS or NS 50 ml bag for PEDS    hydrOXYzine (ATARAX) tablet 25 mg    lidocaine  (LMX4) cream    lidocaine 1 % 0.1-1 mL    magnesium hydroxide (MILK OF MAGNESIA) suspension 30 mL    melatonin tablet 1 mg    nitroGLYcerin (NITROSTAT) sublingual tablet 0.4 mg    ondansetron (ZOFRAN ODT) ODT tab 4 mg    Or    ondansetron (ZOFRAN) injection 4 mg    polyethylene glycol (MIRALAX) Packet 17 g    prochlorperazine (COMPAZINE) injection 5 mg    Or    prochlorperazine (COMPAZINE) tablet 5 mg    Or    prochlorperazine (COMPAZINE) suppository 12.5 mg    sertraline (ZOLOFT) tablet 25 mg    sodium chloride (PF) 0.9% PF flush 3 mL    sodium chloride (PF) 0.9% PF flush 3 mL    sodium chloride 0.9% infusion             Review of Systems:   CV: NEGATIVE for chest pain, palpitations or peripheral edema  C: NEGATIVE for fever, chills, change in weight  E/M: NEGATIVE for ear, mouth and throat problems  R: NEGATIVE for significant cough or SOB          Physical Exam:   All vitals have been reviewed  Patient Vitals for the past 24 hrs:   BP Temp Temp src Pulse Resp SpO2   09/24/23 1144 -- 97.8  F (36.6  C) Oral -- -- --   09/24/23 0800 127/57 -- -- 85 11 98 %   09/24/23 0715 -- 97.4  F (36.3  C) -- -- -- --   09/24/23 0600 117/57 -- -- 85 27 96 %   09/24/23 0400 (!) 115/95 -- -- 87 18 96 %   09/24/23 0020 126/65 -- -- 82 12 100 %   09/24/23 0000 (!) 124/100 -- -- 92 10 100 %   09/23/23 2200 134/77 -- -- 77 12 100 %   09/23/23 2000 129/53 -- -- 83 12 100 %   09/23/23 1924 132/88 98.5  F (36.9  C) Oral 91 22 100 %   09/23/23 1730 (!) 149/74 -- -- -- -- --   09/23/23 1650 (!) 141/54 -- -- 92 -- 94 %   09/23/23 1600 (!) 147/71 -- -- 98 19 100 %   09/23/23 1554 -- -- -- 87 10 100 %   09/23/23 1545 (!) 142/77 -- -- 91 -- 98 %   09/23/23 1530 (!) 157/75 -- -- 95 -- 98 %   09/23/23 1500 127/64 -- -- 93 16 94 %       Intake/Output Summary (Last 24 hours) at 9/24/2023 1430  Last data filed at 9/24/2023 0844  Gross per 24 hour   Intake 3273 ml   Output 1150 ml   Net 2123 ml       Constitutional: Pleasant, alert, appropriate,  following commands.  HEENT: Head atraumatic normocephalic. Pupils equal round and reactive.  Respiratory: Unlabored breathing no audible wheeze  Cardiovascular: Regular rate and rhythm per pulses.  Musculoskeletal: Distinct area of erythema of RLE. No open wounds Compartments are soft, posterior and medial lower leg. Tense over area of erythema however tolerates exam without excessive pain. She is resting comfortably and actively dorsi and plantar flexes both ankle and great toe. Tolerates passive flexion with no grimace. RLE NVI. Bounding DP pulse. SILT. Tenderness to palpation over anterior shoulder. Skin intact. Actively forward flexes and abducts shoulder. ROM intact throughout right elbow and wrist. Reduced extension of the digits of right hand, makes a fist and extends but not fully. No pain with passive movements.  Neurologic: normal without focal findings, mental status, speech normal, alert and oriented x iii, DAVID            Data:   All laboratory data reviewed  Results for orders placed or performed during the hospital encounter of 09/23/23   Chest XR,  PA & LAT     Status: None    Narrative    EXAM: XR CHEST 2 VIEWS  LOCATION: [facility]  DATE/TIME: 9/23/2023 10:37 AM CDT    INDICATION: overdose, vomiting  COMPARISON: None.      Impression    IMPRESSION:     The cardiac silhouette is normal in size. Hilar and mediastinal interfaces are normal.    The lungs are symmetrically inflated. There are no airspace or interstitial opacities.    Diaphragm curvature is normal. No pleural fluid is present.    Small thoracic spine degenerative osteophytes are present.      Head CT w/o contrast     Status: None    Narrative    EXAM: CT HEAD WITHOUT CONTRAST  LOCATION: [facility]  DATE: 09/23/2023    INDICATION: Overdose, found down.  COMPARISON: None.  TECHNIQUE: Routine CT Head without IV contrast. Multiplanar reformats. Dose reduction techniques were  used.    FINDINGS:  INTRACRANIAL CONTENTS: No finding for intracranial hemorrhage, mass, or acute infarct. Ventricles are normal in size. Normal gray-white matter differentiation. No mass effect or midline shift.    Tips of the cerebellar tonsils are excluded from the volume imaged. Sella is unremarkable for technique. Corpus callosum is normally formed.    VISUALIZED ORBITS/SINUSES/MASTOIDS: Prior cataract surgeries. Mild right anterior ethmoid sinus mucosal thickening. Middle ear cavities and mastoid air cells are clear.    BONES/SOFT TISSUES: Calvarium is intact, without suspicious lytic or blastic foci.      Impression    IMPRESSION:  1.  No finding for intracranial hemorrhage, mass, or acute infarct.       XR Pelvis 1/2 Views     Status: None    Narrative    EXAM: XR PELVIS 1/2 VIEWS  LOCATION: [facility]  DATE: 9/24/2023    INDICATION: Found down, pressure sore R hip, RLE difficult to adduct, longer compared to LLE  COMPARISON: None.      Impression    IMPRESSION: Normal joint spaces and alignment. No fracture.   XR Shoulder Right 2 Views     Status: None    Narrative    EXAM: XR SHOULDER RIGHT 2 VIEWS  LOCATION: [facility]  DATE: 9/24/2023    INDICATION: Found down, pressure sore R shoulder, R shoulder pain and swelling, decreased ROM  COMPARISON: None.      Impression    IMPRESSION: Hypertrophic arthritic change of the acromioclavicular joint. Question a subtle irregularity at the base of the coronoid process of the scapula on the external rotated view, indeterminate for nondisplaced fracture. Consider CT for definitive   characterization. No other evidence for acute fracture. The glenohumeral joint is intact.   XR Lumbar Spine 2/3 Views     Status: None    Narrative    EXAM: XR LUMBAR SPINE 2/3 VIEWS  LOCATION: [facility]  DATE: 9/24/2023    INDICATION: Found down, acute R foot drop  COMPARISON: None.      Impression     IMPRESSION: Partial sacralization of the L5 vertebral body. Right apex curvature measures approximately 20 degrees. There is approximately 5 mm right lateral listhesis of L3 on L4. Mild grade 1 anterolisthesis of L3 on L4. Vertebral body heights are   grossly preserved. Multilevel degenerative disc height loss and facet arthropathy. The bones appear diffusely demineralized which may reflect osteoporosis. The sacroiliac joints and soft tissues are unremarkable.   [place] Draw     Status: None    Narrative    The following orders were created for panel order [place] Draw.  Procedure                               Abnormality         Status                     ---------                               -----------         ------                     Extra Blue Top Tube[[id number]]                              Final result               Extra Red Top Tube[[id number]]                                                          Extra Green Top (Lithium...[[id number]]                                                 Extra Purple Top Tube[[id number]]                                                         Please view results for these tests on the individual orders.   Extra Blue Top Tube     Status: None   Result Value Ref Range    Hold Specimen [facility]    Comprehensive metabolic panel     Status: Abnormal   Result Value Ref Range    Sodium 133 (L) 136 - 145 mmol/L    Potassium 5.5 (H) 3.4 - 5.3 mmol/L    Chloride 94 (L) 98 - 107 mmol/L    Carbon Dioxide (CO2) 22 22 - 29 mmol/L    Anion Gap 17 (H) 7 - 15 mmol/L    Urea Nitrogen 18.1 8.0 - 23.0 mg/dL    Creatinine 0.95 0.51 - 0.95 mg/dL    Calcium 9.3 8.8 - 10.2 mg/dL    Glucose 114 (H) 70 - 99 mg/dL    Alkaline Phosphatase 82 35 - 104 U/L     (H) 0 - 45 U/L    ALT 58 (H) 0 - 50 U/L    Protein Total 8.5 (H) 6.4 - 8.3 g/dL    Albumin 4.7 3.5 - 5.2 g/dL    Bilirubin Total 0.2 <=1.2 mg/dL    GFR Estimate 66 >60 mL/min/1.73m2   Salicylate level     Status: Normal   Result Value Ref Range     Salicylate <0.3   mg/dL   Acetaminophen level     Status: Abnormal   Result Value Ref Range    Acetaminophen <5.0 (L) 10.0 - 30.0 ug/mL   Alcohol level blood     Status: Normal   Result Value Ref Range    Alcohol ethyl <0.01 <=0.01 g/dL   CBC with platelets and differential     Status: Abnormal   Result Value Ref Range    WBC Count 15.4 (H) 4.0 - 11.0 10e3/uL    RBC Count 4.62 3.80 - 5.20 10e6/uL    Hemoglobin 13.7 11.7 - 15.7 g/dL    Hematocrit 42.6 35.0 - 47.0 %    MCV 92 78 - 100 fL    MCH 29.7 26.5 - 33.0 pg    MCHC 32.2 31.5 - 36.5 g/dL    RDW 12.0 10.0 - 15.0 %    Platelet Count 275 150 - 450 10e3/uL    % Neutrophils 91 %    % Lymphocytes 2 %    % Monocytes 6 %    % Eosinophils 0 %    % Basophils 0 %    % Immature Granulocytes 1 %    NRBCs per 100 WBC 0 <1 /100    Absolute Neutrophils 14.1 (H) 1.6 - 8.3 10e3/uL    Absolute Lymphocytes 0.3 (L) 0.8 - 5.3 10e3/uL    Absolute Monocytes 1.0 0.0 - 1.3 10e3/uL    Absolute Eosinophils 0.0 0.0 - 0.7 10e3/uL    Absolute Basophils 0.0 0.0 - 0.2 10e3/uL    Absolute Immature Granulocytes 0.1 <=0.4 10e3/uL    Absolute NRBCs 0.0 10e3/uL   iStat Gases (lactate) venous, POCT     Status: Abnormal   Result Value Ref Range    Lactic Acid POCT 3.0 (H) <=2.0 mmol/L    Bicarbonate Venous POCT 28 21 - 28 mmol/L    O2 Sat, Venous POCT 64 (L) 94 - 100 %    pCO2 Venous POCT 57 (H) 40 - 50 mm Hg    pH Venous POCT 7.30 (L) 7.32 - 7.43    pO2 Venous POCT 37 25 - 47 mm Hg   Drug Abuse Screen Qual Urine     Status: Abnormal   Result Value Ref Range    Amphetamines Urine Screen Negative Screen Negative    Barbituates Urine Screen Negative Screen Negative    Benzodiazepine Urine Screen Positive (A) Screen Negative    Cannabinoids Urine Screen Positive (A) Screen Negative    Cocaine Urine Screen Negative Screen Negative    Fentanyl Qual Urine Screen Negative Screen Negative    Opiates Urine Screen Positive (A) Screen Negative    PCP Urine Screen Negative Screen Negative   Lactic acid whole  blood     Status: Normal   Result Value Ref Range    Lactic Acid 1.3 0.7 - 2.0 mmol/L   Salicylate level     Status: Normal   Result Value Ref Range    Salicylate <0.3   mg/dL   Glucose by meter     Status: Abnormal   Result Value Ref Range    GLUCOSE BY METER POCT 110 (H) 70 - 99 mg/dL   Glucose by meter     Status: Normal   Result Value Ref Range    GLUCOSE BY METER POCT 97 70 - 99 mg/dL   CK total     Status: Abnormal   Result Value Ref Range    CK >22,000 (HH) 26 - 192 U/L   Salicylate level     Status: Normal   Result Value Ref Range    Salicylate <0.3   mg/dL   Acetaminophen level     Status: Abnormal   Result Value Ref Range    Acetaminophen <5.0 (L) 10.0 - 30.0 ug/mL   Comprehensive metabolic panel     Status: Abnormal   Result Value Ref Range    Sodium 132 (L) 136 - 145 mmol/L    Potassium 4.9 3.4 - 5.3 mmol/L    Chloride 97 (L) 98 - 107 mmol/L    Carbon Dioxide (CO2) 25 22 - 29 mmol/L    Anion Gap 10 7 - 15 mmol/L    Urea Nitrogen 11.1 8.0 - 23.0 mg/dL    Creatinine 0.55 0.51 - 0.95 mg/dL    Calcium 8.8 8.8 - 10.2 mg/dL    Glucose 104 (H) 70 - 99 mg/dL    Alkaline Phosphatase 64 35 - 104 U/L    AST 1,069 (HH) 0 - 45 U/L     (H) 0 - 50 U/L    Protein Total 6.6 6.4 - 8.3 g/dL    Albumin 3.6 3.5 - 5.2 g/dL    Bilirubin Total 0.2 <=1.2 mg/dL    GFR Estimate >90 >60 mL/min/1.73m2   CBC with platelets     Status: Abnormal   Result Value Ref Range    WBC Count 11.9 (H) 4.0 - 11.0 10e3/uL    RBC Count 3.69 (L) 3.80 - 5.20 10e6/uL    Hemoglobin 10.9 (L) 11.7 - 15.7 g/dL    Hematocrit 33.6 (L) 35.0 - 47.0 %    MCV 91 78 - 100 fL    MCH 29.5 26.5 - 33.0 pg    MCHC 32.4 31.5 - 36.5 g/dL    RDW 12.2 10.0 - 15.0 %    Platelet Count 169 150 - 450 10e3/uL   Bilirubin direct     Status: Normal   Result Value Ref Range    Bilirubin Direct <0.20 0.00 - 0.30 mg/dL   Basic metabolic panel     Status: Abnormal   Result Value Ref Range    Sodium 136 136 - 145 mmol/L    Potassium 4.6 3.4 - 5.3 mmol/L    Chloride 104 98 -  107 mmol/L    Carbon Dioxide (CO2) 25 22 - 29 mmol/L    Anion Gap 7 7 - 15 mmol/L    Urea Nitrogen 9.4 8.0 - 23.0 mg/dL    Creatinine 0.49 (L) 0.51 - 0.95 mg/dL    Calcium 8.3 (L) 8.8 - 10.2 mg/dL    Glucose 96 70 - 99 mg/dL    GFR Estimate >90 >60 mL/min/1.73m2   CK total     Status: Abnormal   Result Value Ref Range    CK >22,000 (HH) 26 - 192 U/L   INR     Status: Abnormal   Result Value Ref Range    INR 1.19 (H) 0.85 - 1.15   Albumin level     Status: Abnormal   Result Value Ref Range    Albumin 3.3 (L) 3.5 - 5.2 g/dL   EKG 12 lead     Status: None   Result Value Ref Range    Systolic Blood Pressure  mmHg    Diastolic Blood Pressure  mmHg    Ventricular Rate 85 BPM    Atrial Rate 85 BPM    NJ Interval 136 ms    QRS Duration 80 ms     ms    QTc 406 ms    P Axis 73 degrees    R AXIS 81 degrees    T Axis 66 degrees    Interpretation ECG       Sinus rhythm  Normal ECG  No previous ECGs available  Confirmed by GENERATED REPORT, COMPUTER ([id number]),  Chau Leiva ([id number]) on 9/23/2023 10:33:14 AM     CBC with platelets + differential     Status: Abnormal    Narrative    The following orders were created for panel order CBC with platelets + differential.  Procedure                               Abnormality         Status                     ---------                               -----------         ------                     CBC with platelets and d...[[id number]]  Abnormal            Final result                 Please view results for these tests on the individual orders.   Urine Drugs of Abuse Screen     Status: Abnormal    Narrative    The following orders were created for panel order Urine Drugs of Abuse Screen.  Procedure                               Abnormality         Status                     ---------                               -----------         ------                     Drug Abuse Screen Qual U...[[id number]]  Abnormal            Final result                 Please view results for these  tests on the individual orders.          Attestation:  I have reviewed today's vital signs, notes, medications, labs and imaging   Amount of time performed on this consult: 50 minutes.    Taylor Dos Santos PA-C  [facility]

## 2023-09-24 NOTE — PLAN OF CARE
Pt arrived to floor at 1730    Suicide precautions maintained. Bedside attendant present. Belongings removed from room. A&O x 3, disoriented to time. Pt lethargic at times, slow to respond w/ slurred speech. VSS on 2L via NC. Tele: SR. Not oob during shift. Pt weight shifting in bed. PIV x1 infusing LR at 100ml/hr. Scattered peeling/red rash, chronic from prior to admission. Pain controlled w/ scheduled tylenol. Continue plan of care.

## 2023-09-24 NOTE — PROVIDER NOTIFICATION
MD Notification    Notified Person: MD    Notified Person Name: Dr. Lobato    Notification Date/Time: 9/24/23 at 6:40 am    Notification Interaction: Vocmonica    Purpose of Notification: Critical lab value AST 1069.    Orders Received: See new orders    Comments: Finish bolus from RRT, give additional 1L bolus, and then restart fluids at 300cc/hr.

## 2023-09-24 NOTE — PROVIDER NOTIFICATION
MD Notification    Notified Person: MD    Notified Person Name: Dr. Dickey    Notification Date/Time: 9/23/23 at 9:24pm    Notification Interaction: Clemente    Purpose of Notification: This patient came from the ED around 5:30pm. She is an intentional overdose. During report I was told she is on a 72hr hold. She doesn't have anything in her orders saying she is on a hold. We have the paperwork for the 72hr hold but nothing was filled out or signed. She is still having suicidal ideations. ANS asked us to page the admitting provider who is no longer here. We reached out to the [facility] E[facility]ON[facility] who put in suicide precautions for us just now. Is it possible for you or someone else to fill out the paper work, or should we just keep it on hand in case she tries to leave?    Orders Received: No new orders    Comments: None of the notes indicate the patient is on a hold. At this time will hold off on the filling out the paperwork. If patient tries to leave then the hold can be placed.

## 2023-09-24 NOTE — PLAN OF CARE
Pt a/o x 4. Continues to have right sided weakness 3/5 and tingling to RLE. Otherwise, neuro intact. Stated she still has suicidal ideation. Sitter at bedside. Suicide precautions in place.  VSS on RA, tele NSR. LS CTA. Voiding well- urine clear yellow. -BM. Regular diet- she ate a few bites for lunch and an Ensure.  NS decreased to 150 ml/hr.  IV abx. CK recheck was still >22,000. Pt refused brain MRI even after education. MRI reordered for tomorrow in case she changes her mind. Reddened areas to right shoulder, elbow, hip, and calf have improved slightly.  Pt has discomfort in right calf, ice and elevation helpful. Up assist x 1 to BSC.    Goal Outcome Evaluation:      Plan of Care Reviewed With: patient

## 2023-09-24 NOTE — PROVIDER NOTIFICATION
Spoke with [employer] regarding pt's status of being on a hold or not. Pt currently has no orders for a hold. The 72hr hold paper is not filled out either. There is nothing in the notes stating patient is on a hold. There are no suicide precautions in place. There is a 1:1 in the room. Patient is still having suicidal ideation. House anthony placed order for suicide precautions, and asked writer to reach out to the hospitalist to see if they want to place patient on a hold.

## 2023-09-24 NOTE — PLAN OF CARE
Pt originally disoriented to time and situation, is now A/Ox4. VSS on RA. Tele SR. LS diminished. BS hypo, +flatus, -bm. Voiding adequately. Urine is cola colored. Denied pain. Up to bedside commode with assist x2 and gait belt. Baseline numbness and tingling to BLE, and BUE.  BLE weak, R side more than left. Unable to perform heel to shin maneuver on right side. BLE week dorsiflexion, plantarflexion. Pupils sluggish. Neuro's were improving throughout shift. Pt reported that prior to hospitalization she hasn't had any issues with her right side. RRT was called. Please see house ELEONORA's note. Pt has bruising to her forehead, right should, right medial and lateral elbow, left medial knee, right lateral hip/thigh, and right calf. All spots marked. All spots are warm, slightly edematous. Areas around injuries are soft. Sitter at bedside. Pt still voicing suicidal ideation. Currently receiving 2nd bolus. Ortho consult placed. MRI needed.

## 2023-09-24 NOTE — CONSULTS
Gastroenterology Consultation    Patient: Gabriela Gray   1960  Date of Consult:  9/24/2023  Admission Date/Time: 9/23/2023  9:56 AM  Primary Care Provider:  Rosenstein, Hanan J I was asked to see this patient in consultation by Wen Parker MD for evaluation of abnormal AST.    HPI:Gabriela Gray is a 63 year old female with PMH of depression, anxiety, insomnia, chronic pain, erythromelalgia, on chronic narcotic pain medication admitted via the emergency department on 9/23/2022 after intentional drug overdose.  Reportedly patient took morphine ER 60 mg total of 9 tablets, OxyContin 30 mg total 5 tablets in reportedly unknown Ambien tablet.  Reportedly patient was found by her son unresponsive covered with her own vomit.  Narcan was administered by EMS and patient became more alert.     Today she c/o dry throat and did not want to eat breakfast. Overall feels tired, no acute pain, n/v, or other complaints.  Noted to have elevated AST on admission.  Acetaminophen level not significant.    Bedside sitter is present.    REVIEW OF SYSTEMS:  A comprehensive ten point review of systems was performed and was negative aside from those in mentioned in the HPI.      PAST MEDICAL HISTORY:  Past Medical History:   Diagnosis Date    Depression     Erythromelalgia (H)        PAST SURGICAL HISTORY:  No past surgical history on file.    MEDICATIONS:   :   Prior to Admission Medications   Prescriptions Last Dose Informant Patient Reported? Taking?   hydrOXYzine (ATARAX) 25 MG tablet Unknown at PRN Self Yes Yes   Sig: Take 25 mg by mouth every 6 hours as needed for anxiety   sertraline (ZOLOFT) 25 MG tablet 9/22/2023 Self Yes Yes   Sig: Take 25 mg by mouth daily   zolpidem (AMBIEN) 10 MG tablet 9/22/2023 at took 3 Self Yes Yes   Sig: Take 10 mg by mouth At Bedtime      Facility-Administered Medications: None       ALLERGIES:   : No Known Allergies    SOCIAL HISTORY:  Social History     Tobacco Use     Smoking status: Never    Smokeless tobacco: Never       FAMILY HISTORY:  No first degree relative with colon cancer, gastric cancer, crohn's disease, ulcerative colitis, or liver disease.     EXAM:  General Appearance: Alert, oriented x3, no acute distress.  /57   Pulse 85   Temp 97.8  F (36.6  C) (Oral)   Resp 11   Wt 65.4 kg (144 lb 2.9 oz)   SpO2 98%   BMI 22.58 kg/m    Eye: sclera anicteric.  CV: RRR.  Resp: CTA bilaterally.  GI: Soft, NABS, NT/ND, no masses.  Musculoskeletal: no joint swelling, or erythema, 1+ pedal edema.  Neuro: alert and non-focal.     Labs and imaging reviewed    Recent Labs   Lab Test 09/24/23  0943 09/23/23  1009 03/10/19  1459   WBC 11.9* 15.4* 9.0   HGB 10.9* 13.7 12.9   MCV 91 92 90    275 196   INR 1.19*  --   --      Recent Labs   Lab Test 09/24/23  0943 09/24/23  0445 09/23/23  1009   POTASSIUM 4.6 4.9 5.5*   CHLORIDE 104 97* 94*   CO2 25 25 22   BUN 9.4 11.1 18.1   ANIONGAP 7 10 17*     Recent Labs   Lab Test 09/24/23  0943 09/24/23  0445 09/23/23  1009 03/10/19  1459   ALBUMIN 3.3* 3.6 4.7 3.9   BILITOTAL  --  0.2 0.2 0.2   ALT  --  213* 58* 15   AST  --  1,069* 232* 17   LIPASE  --   --   --  220     CK total >>22,000    ASSESSMENT AND PLAN:    Middle aged female with laboratory abnormalities following a suicide attempt.  Clinically stable.  No known history of prior liver disease.  Transaminitis is likely secondary to medication overdose, and is not at a worrisome level given the time course.  INR OK.  Acetaminophen was not part of her ingestion.    Follow LFTs and INR daily x 3.  Please call us for follow up if her labs do not show improvement.     55 minutes of total time was spent providing patient care, including patient evaluation, reviewing documentation/test results, and .          Wilmer Guevara MD  Thank you for the opportunity to participate in the care of this patient.   Please feel free to call with any questions or  concerns.  [phone].       CC: [facility], Rosenstein, Hanan J

## 2023-09-24 NOTE — CODE/RAPID RESPONSE
"[facility]    House [facility] RRT Note  9/24/2023   Time Called: 0401    RRT called for: Neurological changes     Assessment & Plan     RUE weakness, slight ataxia possibly 2/2 prolonged downtime, ?brachial plexus injury.  RLE weakness, slight foot drop, unable to adduct possibly 2/2 prolonged downtown, will rule out R hip fracture and lumbar spine fracture or disc injury, ?sciatic/peroneal nerve injury.  R shoulder, R medial forearm, R elbow, R hip, R lateral calf, L medial knee, midline/R forehead erythema, edema concerning for soft tissue injury in setting of suspected prolonged downtime.  Cola colored urine concerning for rhabdomyolysis in setting of suspected prolonged downtime with noted injuries above.   Acute rhabdomyolysis 2/2 prolonged downtime in setting of intentional medication overdose.   Transaminitis.  - Upon arrival, pt sitting upright in bed, awake, alert, in no overt distress with bedside attendant present at pt's bedside.  Nursing notes at start of shift, 1900, pt lethargic, obvious R sided weakness.  As time progressed, pt has become more awake, R sided weakness has improved per nursing report; however, pt notes \"my arm and leg aren't normally like this\" expressing RUE/RLE weakness.  Pt notes her RUE feels stiff and needs to move.  Pt's R grasp and push/pull 4/5 compared to LUE 5/5.  Pt notes RLE weakness with noted R foot drop, and states \"I just can't seem to move it like my left leg\".  Pt able to slowly abduct her RLE but unable to adduct her RLE.  Throughout the shift, pt has also developed several areas of erythema/edema on R side concerning for soft tissue injuries.  Pt's VS noting HR 80s, SBP 110s, RR 20s, O2 sats > 92% on RA.      RUE tremor previously documented, inquire if possibly ataxia was noted.    INTERVENTIONS:  - BG - 96  - Had considered activating CODE STROKE; however, in setting of pt's mentation improving with noted improvement in R sided weakness, has " obvious reason for R sided weakness in setting of suspected prolonged downtime on R side (due to noted several soft tissue injuries), outside window for TNK, did not activate CODE STROKE   - Noted CT head WO on admission negative for any acute abnormalities  - Stat R shoulder xray, pelvis xray, lumbar spine xray  - Pending xray imaging, if negative, may need to consider further workup for R sided weakness including brain MRI (pt last known well 9/22 1930, outside 24 hour window for thrombectomy as well)  - Stat CK + AM labs; will check CK and BMP Q6H x4 occurrences   - 1L NS over 1 hour now for elevated CK (pt received 2L IVF bolus + MIVF at 100 ml/hr since 1600 9/24 = ~ 3L)  - Will adjust MIVF to NS at 300 ml/hr  - Had considered more aggressive hydration; however, in setting of creatinine and K WNL, will continue with above plan  - Requested for nursing to outline skin wounds to continue to monitor; will consult WOC, appreciate recommendations  - Strict I/O  - Pt remains IMC status    At the end of the RRT pt remains hemodynamically stable, in no overt distress    Addendum: 0640: After review of imaging, will consult orthopedic service to assist with determining if additional workup deemed appropriate for R shoulder limited ROM and R hip decreased ROM, difficulty with adduction.  Will also order brain MRI WWO to rule out ischemic event contributing to R sided weakness and ataxia.    Discussed with and defer further cares to nursing and hospitalist    Interval History     Gabriela Gray is a 63 year old female who was admitted on 9/23/2023 for intentional overdose.    Medical history significant for: Depression, erythromelalgia, chronic pain disorder, insomnia, OP, vitamin D deficiency, HLP, SANG    Code Status: Full Code    Allergies   No Known Allergies    Physical Exam   Vital Signs with Ranges:  Temp:  [97.8  F (36.6  C)-98.5  F (36.9  C)] 98.5  F (36.9  C)  Pulse:  [77-98] 82  Resp:  [10-34] 12  BP:  "(106-175)/() 126/65  SpO2:  [83 %-100 %] 100 %  I/O last 3 completed shifts:  In: 120 [P.O.:120]  Out: -     Constitutional: Pt sitting upright in bed, awake, alert, in no overt distress with bedside attendant present at pt's bedside  Neck: No upper airway wheezes or stridor noted  Pulmonary: In no apparent respiratory distress  Cardiovascular: Appears well perfused, regular rate, rhythm   GI: Round, soft  Skin/Integumen: Warm, dry, noted very erythematous bilateral toes (pt states baseline), R shoulder, R medial forearm, R elbow, R hip, R lateral calf, L medial knee, midline/R forehead erythema, edema  Neuro: A/Ox4, PERRL 2mm, clear speech, CN II-XII intact.  Pt's R grasp and push/pull 4/5 compared to LUE 5/5.  RUE with noted ataxia, LUE no obvious ataxia noted.  Pt notes RLE weakness with noted R foot drop, and states \"I just can't seem to move it like my left leg\".  Pt able to slowly abduct her RLE but unable to adduct her RLE.  Pt able to dorsi/plantar flex bilaterally; however, R 4/5 compared to L 5/5.   Psych:  Calm  Extremities: No obvious bony abnormalities noted, RLE appears slightly longer compared to LLE, no obvious external rotation noted.  Slight R foot drop noted.     Data     IMAGING: (X-ray/CT/MRI)   Recent Results (from the past 24 hour(s))   Chest XR,  PA & LAT    Narrative    EXAM: XR CHEST 2 VIEWS  LOCATION: [facility]  DATE/TIME: 9/23/2023 10:37 AM CDT    INDICATION: overdose, vomiting  COMPARISON: None.      Impression    IMPRESSION:     The cardiac silhouette is normal in size. Hilar and mediastinal interfaces are normal.    The lungs are symmetrically inflated. There are no airspace or interstitial opacities.    Diaphragm curvature is normal. No pleural fluid is present.    Small thoracic spine degenerative osteophytes are present.      Head CT w/o contrast    Narrative    EXAM: CT HEAD WITHOUT CONTRAST  LOCATION: [facility]  DATE: " 09/23/2023    INDICATION: Overdose, found down.  COMPARISON: None.  TECHNIQUE: Routine CT Head without IV contrast. Multiplanar reformats. Dose reduction techniques were used.    FINDINGS:  INTRACRANIAL CONTENTS: No finding for intracranial hemorrhage, mass, or acute infarct. Ventricles are normal in size. Normal gray-white matter differentiation. No mass effect or midline shift.    Tips of the cerebellar tonsils are excluded from the volume imaged. Sella is unremarkable for technique. Corpus callosum is normally formed.    VISUALIZED ORBITS/SINUSES/MASTOIDS: Prior cataract surgeries. Mild right anterior ethmoid sinus mucosal thickening. Middle ear cavities and mastoid air cells are clear.    BONES/SOFT TISSUES: Calvarium is intact, without suspicious lytic or blastic foci.      Impression    IMPRESSION:  1.  No finding for intracranial hemorrhage, mass, or acute infarct.       XR Pelvis 1/2 Views    Narrative    EXAM: XR PELVIS 1/2 VIEWS  LOCATION: [facility]  DATE: 9/24/2023    INDICATION: Found down, pressure sore R hip, RLE difficult to adduct, longer compared to LLE  COMPARISON: None.      Impression    IMPRESSION: Normal joint spaces and alignment. No fracture.   XR Shoulder Right 2 Views    Narrative    EXAM: XR SHOULDER RIGHT 2 VIEWS  LOCATION: [facility]  DATE: 9/24/2023    INDICATION: Found down, pressure sore R shoulder, R shoulder pain and swelling, decreased ROM  COMPARISON: None.      Impression    IMPRESSION: Hypertrophic arthritic change of the acromioclavicular joint. Question a subtle irregularity at the base of the coronoid process of the scapula on the external rotated view, indeterminate for nondisplaced fracture. Consider CT for definitive   characterization. No other evidence for acute fracture. The glenohumeral joint is intact.   XR Lumbar Spine 2/3 Views    Narrative    EXAM: XR LUMBAR SPINE 2/3 VIEWS  LOCATION: [facility]  DATE: 9/24/2023    INDICATION: Found down, acute R foot drop  COMPARISON: None.      Impression    IMPRESSION: Partial sacralization of the L5 vertebral body. Right apex curvature measures approximately 20 degrees. There is approximately 5 mm right lateral listhesis of L3 on L4. Mild grade 1 anterolisthesis of L3 on L4. Vertebral body heights are   grossly preserved. Multilevel degenerative disc height loss and facet arthropathy. The bones appear diffusely demineralized which may reflect osteoporosis. The sacroiliac joints and soft tissues are unremarkable.       CBC with Diff:  Recent Labs   Lab Test 09/23/23  1009   WBC 15.4*   HGB 13.7   MCV 92           Comprehensive Metabolic Panel:  Recent Labs   Lab 09/24/23  0445   *   POTASSIUM 4.9   CHLORIDE 97*   CO2 25   ANIONGAP 10   *   BUN 11.1   CR 0.55   GFRESTIMATED >90   BRADY 8.8   PROTTOTAL 6.6   ALBUMIN 3.6   BILITOTAL 0.2   ALKPHOS 64   AST 1,069*   *       Recent Labs   Lab 09/24/23  0445   CKT >22,000*        Time Spent on this Encounter   I spent 60 minutes of critical care time on the unit/floor managing the care of Gabriela Gray. Upon evaluation, this patient had a high probability of imminent or life-threatening deterioration due to concern for neurologic change, which required my direct attention, intervention, and personal management. 100% of my time was spent at the bedside counseling the patient and/or coordinating care regarding services listed in this note.    KELBY Chisholm [facility]  Hospitalist-House E[facility]  Hospitalist Service  Securely message with [employer] (more info)  Text page via [employer] Paging/Directory

## 2023-09-24 NOTE — PROVIDER NOTIFICATION
Brief update:    Paged as AST critical in the thousands range.    Suspect this is secondary to rhabdomyolysis.  Undetectable high CK.    Receiving 1 L bolus now with 300 mL/h normal saline thereafter.  Added additional 1 L bolus over 1 hour prior to 300 mL rate infusion.    Will need to monitor urine output closely.  She has had 600 mL out over the past 12 hours per nursing.    Discontinued acetaminophen which had been ordered as scheduled.     Jose Lobato MD  6:55 AM

## 2023-09-25 NOTE — PROVIDER NOTIFICATION
"Brief update:    Paged re: critical high CK.    Severe rhabdo  1L urine output since 8AM; earlier today fluid rate was decreased as pt was complaining of \"peeing too much\" per nursing report    Increased NS infusion to 250/h. Continue to monitor urine output    Jose Lobato MD  1:01 AM    "

## 2023-09-25 NOTE — PROGRESS NOTES
Current condition (current mood & behavior): pt calm, cooperative and denying any active thoughts of suicide   Sitter present: yes  Every 15 minute documentation by NA/RN completed for Shift: yes  Room safety Suicide Checklist completed in Epic: yes  Patient's color of severity (suicide scale): YELLOW  Order for psych consult placed (if appropriate): yes  Suicide care plan added: yes      Elvira Caraballo RN

## 2023-09-25 NOTE — PLAN OF CARE
Orientation: A&Ox4. Pt denies thoughts of suicide or harming herself at this time    Vitals/Pain: VSS on RA sating >90%. Pt reports R shin and R elbow pain where the redness and semifirm edema is, managing with cold packs and PRN Atarax    Neuros: intact expect R side weakness due to fall PTA - Pt unable to use R hand, able to move wrist and use fingers to squeeze for assessment  - MD aware, wants to complete MRI to further evaluate. R leg able to swing out, but not bend knee for assessment   Tele: NSR   Lines/Drains: L PIV with NS running at 250 ml/hr   LS: clear   GI/: BS +, x1 BM this shift. Pt having adequate urine output throughout shift   Labs: Abnormal/Trends, Electrolyte Replacement- CK >22,000 - plan to check lab daily and IV fluids   Ambulation/Assist: X1 GB and walker.   Skin/Wounds: R calf red with swelling - semi firm and warm. R hip edema, soft. R elbow swelling with redness, warm and semi firm. R upper shoulder edema, red and semi firm. All wound areas are outlined, redness has not moved outside the outline. L hand redness worse then R hand. Bilat feet with redness present   Plan: BLE and RUE US completed, negative for DVT. MRI done, pending results     Goal Outcome Evaluation:  Plan of Care Reviewed With: patient, child  Overall Patient Progress: no change  Outcome Evaluation: CK remains >22,000. Continue IV fluids and recheck in AM. Pt has no active suicidal thoughts throughout shift

## 2023-09-25 NOTE — PLAN OF CARE
A/Ox4. VSS can be tachycardic at times. On RA. Tele ST. LS diminished. +bs, +flatus, -bm. Voiding adequately. Still having right sided weakness. More in RLE. RUE getting stronger. Otherwise neuros unchanged from previous shift. Denied pain. Red areas to right shoulder, elbow, hip, calf and left knee are unchanged. The spot on pt's right calf is most sore. Using ice for comfort. Also has small abrasion to forehead. IVF infusing at 250ml/hr for CK > 22,000. Up with assist x1 gb and walker. Using bedside commode.

## 2023-09-25 NOTE — PROVIDER NOTIFICATION
MD Notification    Notified Person: MD    Notified Person Name: Stephan Mckenzie    Notification Date/Time: 9/24/23 at 7:46pm    Notification Interaction: Clemente    Purpose of Notification: Can you place an order for INR and LFTs lab draw for tomorrow AM? Per GI note they want it checked x3 days but not ordered. Thanks!    Orders Received: MD to place orders

## 2023-09-25 NOTE — CONSULTS
Initial Psychiatric Consult   Consult date: September 25, 2023         Reason for Consult, requesting source:      intentional drug overdose     Requesting source: Wen Parker    Labs and imaging reviewed. Patient seen and evaluated by Karley Piña MD          HPI:     This is a 63-year-old female admitted on 9/23/2023 in the setting of overdose on number 9 tablets of morphine ER 60 mg, number 5 tablets of OxyContin 30 mg and an unknown number of Ambien.  Patient's son found her down, as she had been staying with him.  She was covered in her own vomit, which is described as coffee-ground like emesis.  EMS were activated, and patient apparently revived once Narcan was given at the scene.  Patient has chronic pain.   She apparently had a CK greater than 22,000.  CT head was negative.  Patient had a transaminitis, with AST climbing to almost 1100.  Acetaminophen level was negative.    Patient seen today after reviewing the chart.  We had a lengthy conversation.  She is very open about the fact that she overdosed on her opiates.  She actually has not been prescribed opiates for many years, but had so many opiates stockpiled, that she has been taking them consistently for years now.  Most recently, she has been taking morphine ER a total of 30 mg/day.  She had not been taking OxyContin, she only did this when she OD'd.  It was difficult for me to tell whether she has been taking methadone consistently, or just when she OD'd.  She states that she took 60 mg total of methadone when she OD'd.    Patient agrees that she needs inpatient psychiatric hospitalization.  She did asked to have her Ambien back, because she has been on this for years        Past Psychiatric History:   From prior psychiatric note found from 2008, the patient has a history of cutting her wrist at age 17 and attempted suicide.  This note stated that she had been in therapy on and off.  She had a diagnosis of attention deficit disorder in  the past and had been taking Ritalin at that time.  She is also had trials of Zoloft and Prozac.    Patient states that she had been on Zoloft and Wellbutrin until 2 months ago, when she stopped taking them abruptly.  She states that she feels like she spiraled in terms of her mental health because of that.        Substance Use and History:      I see no prescriptions for opiate analgesia, or any controlled substances aside from Ambien this year on PDMP.        Past Medical History:   PAST MEDICAL HISTORY:   Past Medical History:   Diagnosis Date    Depression     Erythromelalgia (H)        PAST SURGICAL HISTORY: No past surgical history on file.          Family History:   FAMILY HISTORY: No family history on file.    Family Psychiatric History:         Social History:   SOCIAL HISTORY:   Social History     Tobacco Use    Smoking status: Never    Smokeless tobacco: Never   Substance Use Topics    Alcohol use: Not on file       Patient recently broke up with her partner of 25 years and went back with her ex- who she  30 years ago.  Then it sounds like she broke up with the ex-, and was considering renting a room from the ex-boyfriend.  Family is not in support of this.         Physical ROS:   The 10 point Review of Systems is negative other than noted in the HPI or here.           Medications:      cefTRIAXone  2 g Intravenous Q24H    polyethylene glycol  17 g Oral Daily    sertraline  25 mg Oral Daily    sodium chloride (PF)  3 mL Intracatheter Q8H              Allergies:   No Known Allergies       Labs:     Recent Results (from the past 48 hour(s))   Lactic acid whole blood    Collection Time: 09/23/23  2:16 PM   Result Value Ref Range    Lactic Acid 1.3 0.7 - 2.0 mmol/L   Glucose by meter    Collection Time: 09/23/23  6:25 PM   Result Value Ref Range    GLUCOSE BY METER POCT 110 (H) 70 - 99 mg/dL   Glucose by meter    Collection Time: 09/24/23  4:08 AM   Result Value Ref Range    GLUCOSE BY  METER POCT 97 70 - 99 mg/dL   CK total    Collection Time: 09/24/23  4:45 AM   Result Value Ref Range    CK >22,000 (HH) 26 - 192 U/L   Comprehensive metabolic panel    Collection Time: 09/24/23  4:45 AM   Result Value Ref Range    Sodium 132 (L) 136 - 145 mmol/L    Potassium 4.9 3.4 - 5.3 mmol/L    Chloride 97 (L) 98 - 107 mmol/L    Carbon Dioxide (CO2) 25 22 - 29 mmol/L    Anion Gap 10 7 - 15 mmol/L    Urea Nitrogen 11.1 8.0 - 23.0 mg/dL    Creatinine 0.55 0.51 - 0.95 mg/dL    Calcium 8.8 8.8 - 10.2 mg/dL    Glucose 104 (H) 70 - 99 mg/dL    Alkaline Phosphatase 64 35 - 104 U/L    AST 1,069 (HH) 0 - 45 U/L     (H) 0 - 50 U/L    Protein Total 6.6 6.4 - 8.3 g/dL    Albumin 3.6 3.5 - 5.2 g/dL    Bilirubin Total 0.2 <=1.2 mg/dL    GFR Estimate >90 >60 mL/min/1.73m2   Bilirubin direct    Collection Time: 09/24/23  4:45 AM   Result Value Ref Range    Bilirubin Direct <0.20 0.00 - 0.30 mg/dL   Salicylate level    Collection Time: 09/24/23  6:16 AM   Result Value Ref Range    Salicylate <0.3   mg/dL   Acetaminophen level    Collection Time: 09/24/23  6:16 AM   Result Value Ref Range    Acetaminophen <5.0 (L) 10.0 - 30.0 ug/mL   CBC with platelets    Collection Time: 09/24/23  9:43 AM   Result Value Ref Range    WBC Count 11.9 (H) 4.0 - 11.0 10e3/uL    RBC Count 3.69 (L) 3.80 - 5.20 10e6/uL    Hemoglobin 10.9 (L) 11.7 - 15.7 g/dL    Hematocrit 33.6 (L) 35.0 - 47.0 %    MCV 91 78 - 100 fL    MCH 29.5 26.5 - 33.0 pg    MCHC 32.4 31.5 - 36.5 g/dL    RDW 12.2 10.0 - 15.0 %    Platelet Count 169 150 - 450 10e3/uL   Basic metabolic panel    Collection Time: 09/24/23  9:43 AM   Result Value Ref Range    Sodium 136 136 - 145 mmol/L    Potassium 4.6 3.4 - 5.3 mmol/L    Chloride 104 98 - 107 mmol/L    Carbon Dioxide (CO2) 25 22 - 29 mmol/L    Anion Gap 7 7 - 15 mmol/L    Urea Nitrogen 9.4 8.0 - 23.0 mg/dL    Creatinine 0.49 (L) 0.51 - 0.95 mg/dL    Calcium 8.3 (L) 8.8 - 10.2 mg/dL    Glucose 96 70 - 99 mg/dL    GFR Estimate  >90 >60 mL/min/1.73m2   CK total    Collection Time: 09/24/23  9:43 AM   Result Value Ref Range    CK >22,000 (HH) 26 - 192 U/L   INR    Collection Time: 09/24/23  9:43 AM   Result Value Ref Range    INR 1.19 (H) 0.85 - 1.15   Albumin level    Collection Time: 09/24/23  9:43 AM   Result Value Ref Range    Albumin 3.3 (L) 3.5 - 5.2 g/dL   Basic metabolic panel    Collection Time: 09/24/23  5:59 PM   Result Value Ref Range    Sodium 134 (L) 136 - 145 mmol/L    Potassium 4.3 3.4 - 5.3 mmol/L    Chloride 102 98 - 107 mmol/L    Carbon Dioxide (CO2) 24 22 - 29 mmol/L    Anion Gap 8 7 - 15 mmol/L    Urea Nitrogen 9.8 8.0 - 23.0 mg/dL    Creatinine 0.47 (L) 0.51 - 0.95 mg/dL    Calcium 8.4 (L) 8.8 - 10.2 mg/dL    Glucose 100 (H) 70 - 99 mg/dL    GFR Estimate >90 >60 mL/min/1.73m2   CK total    Collection Time: 09/24/23  5:59 PM   Result Value Ref Range    CK >22,000 (HH) 26 - 192 U/L   Basic metabolic panel    Collection Time: 09/25/23 12:05 AM   Result Value Ref Range    Sodium 135 (L) 136 - 145 mmol/L    Potassium 4.1 3.4 - 5.3 mmol/L    Chloride 104 98 - 107 mmol/L    Carbon Dioxide (CO2) 24 22 - 29 mmol/L    Anion Gap 7 7 - 15 mmol/L    Urea Nitrogen 6.8 (L) 8.0 - 23.0 mg/dL    Creatinine 0.46 (L) 0.51 - 0.95 mg/dL    Calcium 8.3 (L) 8.8 - 10.2 mg/dL    Glucose 101 (H) 70 - 99 mg/dL    GFR Estimate >90 >60 mL/min/1.73m2   CK total    Collection Time: 09/25/23 12:05 AM   Result Value Ref Range    CK >22,000 (HH) 26 - 192 U/L   Basic metabolic panel    Collection Time: 09/25/23  6:33 AM   Result Value Ref Range    Sodium 136 136 - 145 mmol/L    Potassium 3.8 3.4 - 5.3 mmol/L    Chloride 105 98 - 107 mmol/L    Carbon Dioxide (CO2) 24 22 - 29 mmol/L    Anion Gap 7 7 - 15 mmol/L    Urea Nitrogen 5.5 (L) 8.0 - 23.0 mg/dL    Creatinine 0.48 (L) 0.51 - 0.95 mg/dL    Calcium 8.2 (L) 8.8 - 10.2 mg/dL    Glucose 94 70 - 99 mg/dL    GFR Estimate >90 >60 mL/min/1.73m2   CK total    Collection Time: 09/25/23  6:33 AM   Result Value  "Ref Range    CK >22,000 (HH) 26 - 192 U/L   INR    Collection Time: 09/25/23  6:33 AM   Result Value Ref Range    INR 1.07 0.85 - 1.15   Hepatic panel    Collection Time: 09/25/23  6:33 AM   Result Value Ref Range    Protein Total 5.8 (L) 6.4 - 8.3 g/dL    Albumin 3.2 (L) 3.5 - 5.2 g/dL    Bilirubin Total 0.2 <=1.2 mg/dL    Alkaline Phosphatase 53 35 - 104 U/L     (HH) 0 - 45 U/L     (H) 0 - 50 U/L    Bilirubin Direct <0.20 0.00 - 0.30 mg/dL          Physical and Psychiatric Examination:     /63   Pulse 92   Temp 98.7  F (37.1  C) (Oral)   Resp 16   Ht 1.702 m (5' 7\")   Wt 68.6 kg (151 lb 4.8 oz)   SpO2 96%   BMI 23.70 kg/m    Weight is 151 lbs 4.8 oz  Body mass index is 23.7 kg/m .    Physical Exam:  I have reviewed the physical exam as documented by by the medical team and agree with findings and assessment and have no additional findings to add at this time.    Mental Status Exam:    Appearance: awake, alert, appeared as age stated, and slightly unkempt  Attitude:  cooperative  Eye Contact:  good  Mood:  depressed  Affect:  mood congruent  Speech:  clear, coherent  Language: Fluent in english   Psychomotor Behavior:  no evidence of tardive dyskinesia, dystonia, or tics  Thought Process:  logical  Associations:  no loose associations  Thought Content:  no evidence of psychotic thought, no auditory hallucinations present, no visual hallucinations present, and patient presented with a suicide attempt, and wishes that it would have worked  Insight:  fair  Judgement:  fair  Oriented to:  time, person, and place  Attention Span and Concentration:  intact  Recent and Remote Memory:  intact  Fund of Knowledge: Appropriate   Gait and Station:                DSM-5 Diagnosis:   Overdose on opiate analgesia, intentional, suicide attempt, subsequent encounter    Mood disorder due to a general medical condition    Chronic pain    Erythromelalgia    Rhabdomyolysis    Major depressive disorder, " recurrent, severe              Assessment:     This is a 63-year-old female who reports depression as long as she can remember.  This undoubtedly is made worse by her chronic erythromelalgia, and the pain that goes along with this.  It also has a huge impact on her functioning and quality of life.  She abruptly stopped taking her antidepressants 2 months ago, and spiraled.  She also states that she has been taking opiate analgesia that she had been stockpiling for many years.  She had saved enough medication, that it sounds like she has been using stockpiled medications for her pain for several years.  I got confused about the timeline.  Basically, she has been taking morphine ER a total of 30 mg p.o. daily.  She does not feel like she is in opiate withdrawal now, but also took as much as 60 mg of methadone when she overdosed.    Patient is agreeable to going to an inpatient psychiatric hospitalization.  Would recommend that we hold off on Zoloft and Wellbutrin, in favor of Cymbalta which actually may give her some benefit for her pain.  Patient continues to have significant rhabdomyolysis.  Would recommend that we hold off on the trial of Cymbalta until the rhabdomyolysis has substantially cleared.            Summary of Recommendations:   1.  We will discontinue hold.  Patient is willing to go to inpatient psychiatry voluntarily.  If she changes her mind, place her back on a hold please, and we will petition for commitment.    2.  Please continue with the one-to-one sitter.  Patient does not really like it, but she wishes that her suicide attempt would have worked.    3.  We will begin a trial of Cymbalta once the patient's rhabdomyolysis has significantly improved.  I would prefer this SNRI, which is FDA indicated for pain management, rather than Wellbutrin and Zoloft together.  I am hoping that she can get some pain benefit from the Cymbalta.    4.  Once patient is medically clear, we will begin looking for an  inpatient psychiatric bed.    5.  I will follow-up.      Karley Piña MD  Consult/Liaison Psychiatry and Addiction Medicine  [facility]

## 2023-09-25 NOTE — PROGRESS NOTES
[facility]    Medicine Progress Note - Hospitalist Service    Date of Admission:  9/23/2023    Assessment & Plan   Intentional prescription drug overdose  Opioid overdose   Suicide attempt    Pt presents to the ED on 9/23 with intentional overdose on multiple prescription drugs (varying reports including Ambien, morphine, methadone, oxycontin). She is only prescribed ambien PTA. The patient's son found her on the ground, face down covered in vomit. He found a suicide note. She reported to the ED provider that she suffers from chronic pain and wanted to end her life because she cannot deal with the pain anymore. EMS administered narcan and the patient became more alert.   * U tox positive for benzos, opiates, and cannabinoids   * 9/25 Mental status now at baseline.  - Admitted to inpatient  - Suicide precautions, bedside attendant   - Psychiatry consultation appreciated   - Hold PTA ambien, hold all other opioids and benzos     Rhabdomyolysis   Transaminitis 2/2 above   R upper extremity, R lower extremity weakness likely 2/2 above vs peripheral nerve palsy from prolonged down time.   CK is elevated to >22,00 since 9/24. Suspect severe rhabdo from prolonged down-time. Orthopedics consulted. No clinical indication of compartment syndrome. Transaminases are also markedly elevated likely 2/2 rhabdo.   * Renal function is stable.   - Continue aggressive IV hydration   - Intake/output   - Daily CK and LFTs   - PT/OT   - MRI brain ordered on 9/23 to evaluate for stroke as a cause of her weakness. Pt has thus far refused. At this point if she did have a stroke she would not be a candidate for any intervention.    Elevated blood pressure without underlying diagnosis of HTN  - As needed hydralazine 5 mg IV for systolic blood pressure over 170 every 6 hours as needed.  - If persistently elevated, consider initiation of po anti-hypertensive      Oropharynx laceration inthe back of her throat  - Unknown  etiology  - Continue to follow-up.     Depression/anxiety/insomnia  - Psychiatry consulted as noted above  - Sertraline resumed     History of HLD  Vitamin D deficiency  Osteoporosis  -No treatment found.  -Patient can follow-up with her primary doctor, after discharge.     Diet: Combination Diet Regular Diet Adult    DVT Prophylaxis: Pneumatic Compression Devices  Bragg Catheter: Not present  Lines: None     Cardiac Monitoring: ACTIVE order. Indication: Drug overdose (24 hours)  Code Status: Full Code      Clinically Significant Risk Factors          # Hypocalcemia: Lowest Ca = 8.2 mg/dL in last 2 days, will monitor and replace as appropriate     # Hypoalbuminemia: Lowest albumin = 3.2 g/dL at 9/25/2023  6:33 AM, will monitor as appropriate                       Disposition Plan      Expected Discharge Date: 09/27/2023,  3:00 PM                Serena Virgen MD  Hospitalist Service  [facility]  Securely message with [employer] (more info)  Text page via [employer] Paging/Directory   ______________________________________________________________________    Interval History   Pt is feeling better this AM. She still has some pain and weakness over the right side of her body.     Physical Exam   Vital Signs: Temp: 99.5  F (37.5  C) Temp src: Oral BP: (!) 156/84 Pulse: 97   Resp: 15 SpO2: 96 % O2 Device: None (Room air)    Weight: 151 lbs 4.8 oz    Constitutional: Awake, alert, cooperative, no apparent distress.  Eyes: Conjunctiva and pupils examined and normal.  HEENT: Moist mucous membranes, normal dentition.  Respiratory: Non-labored breathing   Cardiovascular: Regular rate and rhythm, normal S1 and S2, and no murmur noted.  Skin: No rashes, no cyanosis, RLE 2+ edema   Musculoskeletal: No joint swelling, erythema or tenderness. Soft compartments   Neurologic: Cranial nerves 2-12 intact, normal strength and sensation.  Psychiatric: Alert, oriented to person, place and time, no obvious anxiety or  depression.      Medical Decision Making       60 MINUTES SPENT BY ME on the date of service doing chart review, history, exam, documentation & further activities per the note.      Data     I have personally reviewed the following data over the past 24 hrs:    N/A  \   N/A   / N/A     136 105 5.5 (L) /  94   3.8 24 0.48 (L) \     ALT: 188 (H) AST: 773 (HH) AP: 53 TBILI: 0.2   ALB: 3.2 (L) TOT PROTEIN: 5.8 (L) LIPASE: N/A     INR:  1.07 PTT:  N/A   D-dimer:  N/A Fibrinogen:  N/A       Imaging results reviewed over the past 24 hrs:   Recent Results (from the past 24 hour(s))   US Lower Extremity Venous Duplex Bilateral    Narrative    VENOUS ULTRASOUND BILATERAL LOWER EXTREMITIES  9/25/2023 3:01 PM     HISTORY: Bilateral lower extremity redness and edema    COMPARISON: None.    Technique: Color Doppler and spectral waveform analysis performed  throughout the deep veins of both lower extremities.    FINDINGS: Both common femoral, proximal great saphenous, femoral, and  popliteal veins demonstrate normal blood flow, compression, and  augmentation. Posterior tibial and peroneal veins are compressible.      Impression    IMPRESSION: Negative for DVT throughout both lower extremities.    DEREK RIVERA MD         SYSTEM ID:  [id number]   US Upper Extremity Venous Duplex Right    Narrative    US UPPER EXTREMITY VENOUS DUPLEX RIGHT  9/25/2023 3:02 PM     HISTORY: DVT rule out    COMPARISON: None.    TECHNIQUE: Color Doppler and spectral waveform analysis performed  throughout the deep and superficial veins of the right upper  extremity.    FINDINGS: The right internal jugular, subclavian, axillary, and  brachial veins demonstrate normal blood flow, compression (where  applicable), and augmentation. Cephalic and basilic veins are  compressible. Contralateral left internal jugular and subclavian veins  are patent.      Impression    IMPRESSION: Negative for DVT in the right upper extremity.    DEREK RIVERA MD         SYSTEM  ID:  [id number]

## 2023-09-25 NOTE — PROGRESS NOTES
"   09/25/23 0900   Appointment Info   Signing Clinician's Name / Credentials (PT) Mariajose Hernandez, DPT   Rehab Comments (PT) (S)  Suicide precautions d/t attempt via OD, has sitter. Goals may need updating if pt needs to be IND w/o AD for admit to IP psych.   Living Environment   Living Environment Comments Pt is poor historian, reports that she doesn't have a home. It appears she was staying with her son who lives in a house with 2STE no railings, single level.   Self-Care   Usual Activity Tolerance fair   Current Activity Tolerance fair   Regular Exercise Yes   Activity/Exercise Type   (Yoga)   Exercise Amount/Frequency 3-5 times/wk   Equipment Currently Used at Home none   Fall history within last six months no   Activity/Exercise/Self-Care Comment Reports IND with all ADL's w/o AD. She does not wear socks or shoes ever because they hurt her feet.   General Information   Onset of Illness/Injury or Date of Surgery 09/23/23   Referring Physician Tiffanie Ferrell N, APRN CNP   Patient/Family Therapy Goals Statement (PT) \"get better\"   Pertinent History of Current Problem (include personal factors and/or comorbidities that impact the POC) Gabriela Gray is a 63 year old female presenting via EMS after intentional overdose on Ambien and morphine.  The patient's son states he last saw her at 7:30 PM.  He found her this morning around 9 AM lying facedown on the ground covered in vomit.  He found a suicide note.  The patient suffers from a chronic pain disorder.  She endorses taking Ambien and morphine last night to end her life because she cannot deal with the pain any longer.  EMS administered Narcan, and the patient became more alert.  She currently endorses pain all over.   Existing Precautions/Restrictions fall;other (see comments)  (Suicidal Ideation, active attempt)   Cognition   Affect/Mental Status (Cognition) flat/blunted affect   Orientation Status (Cognition) oriented x 4   Follows Commands (Cognition) " follows multi-step commands   Cognitive Status Comments Calm and cooperative   Pain Assessment   Patient Currently in Pain Yes, see Vital Sign flowsheet   Integumentary/Edema   Integumentary/Edema Comments Patchy erythema on B toes, lateral feet and posterior calves   Posture    Posture Forward head position   Range of Motion (ROM)   Range of Motion ROM deficits secondary to pain;ROM deficits secondary to swelling   ROM Comment R wrist lacks active flex/extension, PROM limited d/t soft tissue block. All other ROM WFL   Strength (Manual Muscle Testing)   Strength Comments  4/5 bilaterally, pt reports weakness in RUE. Appears globally decreased though at least 4/5 globally.   Bed Mobility   Comment, (Bed Mobility) IND supine<>sit   Transfers   Comment, (Transfers) Stand pivot w/o AD to commode supervision, sit<>stand supervision FWW   Gait/Stairs (Locomotion)   Comment, (Gait/Stairs) Amb 15ft supervision w/ FWW   Balance   Balance Comments IND seated, standing supervision   Sensory Examination   Sensory Perception Comments Tingling to B toes and feet   Clinical Impression   Criteria for Skilled Therapeutic Intervention Yes, treatment indicated   PT Diagnosis (PT) Decreased activity tolerance   Influenced by the following impairments Decreased strength, pain   Functional limitations due to impairments Impaired functional mobility   Clinical Presentation (PT Evaluation Complexity) Evolving/Changing   Clinical Presentation Rationale Pt on 72 hour hold for suicide attempt   Clinical Decision Making (Complexity) low complexity   Planned Therapy Interventions (PT) balance training;bed mobility training;cryotherapy;gait training;home exercise program;patient/family education;stair training;ROM (range of motion);strengthening;stretching;transfer training   Anticipated Equipment Needs at Discharge (PT) walker, rolling   Risk & Benefits of therapy have been explained evaluation/treatment results reviewed;care plan/treatment  goals reviewed;risks/benefits reviewed;current/potential barriers reviewed;participants included;participants voiced agreement with care plan;patient   PT Total Evaluation Time   PT Eval, Moderate Complexity Minutes (39836) 20   Physical Therapy Goals   PT Frequency Daily   PT Predicted Duration/Target Date for Goal Attainment 10/02/23   PT Goals Bed Mobility;Gait;Transfers;Stairs   PT: Bed Mobility Independent;Rolling;Supine to/from sit;Bridging   PT: Transfers Independent;Sit to/from stand;Bed to/from chair;Assistive device   PT: Gait Independent;Assistive device;150 feet   PT: Stairs Independent;2 stairs;Assistive device   Interventions   Interventions Quick Adds Therapeutic Activity;Gait Training   Therapeutic Activity   Therapeutic Activities: dynamic activities to improve functional performance Minutes (20511) 8   Treatment Detail/Skilled Intervention stand pivot to commode w/oAD and supervision. Pt ind with pericares. Bariatric socks donned for comfort, education on using large socks and protecting feet during activity.   Gait Training   Gait Training Minutes (22170) 8   Treatment Detail/Skilled Intervention Amb 150ft with FWW, supervision. Therapist managing lines and equipment. Pt has slow steady gait with no overt LOB. Some cues provided for path finding and walker management.   PT Discharge Planning   PT Plan Stairs, gait w/o AD ( arthur needs to be IND for IP pysch admit)   PT Discharge Recommendation (DC Rec) home with assist   PT Rationale for DC Rec Pt will likely DC to IP pysch. Once medically stable, pt can DC to son's house with supervision and FWW pending stairs.   PT Brief overview of current status Supervision FWW   Total Session Time   Timed Code Treatment Minutes 16   Total Session Time (sum of timed and untimed services) 36

## 2023-09-26 NOTE — CONSULTS
[facility]  WO Nurse Inpatient Wound Assessment     Reason for consultation: Evaluate and treat      Assessment  R calf red with swelling - semi firm and warm  R hip edema, soft.  R elbow swelling with redness, warm and semi firm.   R upper shoulder edema, red and semi firm.   Lt medial knee   All wound areas are outlined, redness has not moved outside the outline. Skin is intact, no local s/s infection     Treatment Plan  Wound care:Rt calf; Rt hip; Rt elbow; Rt upper shoulder; Lt medial knee   Monitor for any open areas.   No dressing @ this time  Lotion feet BID (underlying condition of erythromelagia)  4.   If any areas open cover with Mepilex border or sacral     Orders In Epic  Recommended provider order: NA  WOC Nurse follow-up plan: weekly   Nursing to notify the Provider(s) and re-consult the WO Nurse if wound(s) deteriorates or new skin concern.    Patient History  According to provider note(s):    ede Gray is a 63 year old female admitted on 9/23/2023  For intentional overdose reportedly on morphine ER 60 mg 9 tablets, OxyContin 30 mg total of 5 tablets and unknown numbers of Ambien tablets.  PMH includes depression, erythromelalgia, pain disorder, foot pain, primary insomnia, history of osteoporosis, history of vitamin D deficiency, history of hypercholesterolemia and generalized anxiety.       This is a 63-year-old female admitted on 9/23/2023 in the setting of overdose on number 9 tablets of morphine ER 60 mg, number 5 tablets of OxyContin 30 mg and an unknown number of Ambien.  Patient's son found her down, as she had been staying with him.  She was covered in her own vomit, which is described as coffee-ground like emesis.  EMS were activated, and patient apparently revived once Narcan was given at the scene.  Patient has chronic pain.   She apparently had a CK greater than 22,000.  CT head was negative.  Patient had a transaminitis, with AST climbing to  almost 1100.  Acetaminophen level was negative.     Objective Data  Containment of urine/stool: BRP for UIC/FIC    Active Diet Order:  Orders Placed This Encounter      Combination Diet Regular Diet Adult    Output:   I/O last 3 completed shifts:  In: 3670 [P.O.:1540; I.V.:2130]  Out: 4350 [Urine:4350]    Risk Assessment:   Sensory Perception: 3-->slightly limited  Moisture: 4-->rarely moist  Activity: 3-->walks occasionally  Mobility: 3-->slightly limited  Nutrition: 2-->probably inadequate  Friction and Shear: 2-->potential problem  Andrew Score: 17                          Labs:   Recent Labs   Lab 09/25/23  0633 09/24/23  0943   ALBUMIN 3.2* 3.3*   HGB  --  10.9*   INR 1.07 1.19*   WBC  --  11.9*       Physical Exam  Skin inspection: anterior surface of body    Wound Location:  Rt calf; Rt hip; Rt elbow; Rt inner shoulder; Lt medial knee       #1; Rt inner shoulder  Date of phot: 9-25-23    Measurements (length x width x depth, in cm):  linear 9.0cm  x 1cm  x skin intact    Wound Base: 100% pink/blanchable  Tunneling: NA  Undermining: NA  Palpation of the wound bed: firm  Periwound skin: intact  Color: consistent with surrounding skin   Temperature: warm   Drainage: none  Odor: none  Pain: slight tenderness    #2: Rt outer thigh  Date of photo: 9-25-23    Measurements (length x width x depth, in cm):   3.5.0cm  x 3.0cm  x skin intact  Tunneling: NA  Undermining: NA  Palpation of the wound bed: firm  Periwound skin: intact. Firm edema. Erythema fading and not moving outside outlined area  Color: consistent with surrounding skin   Temperature: warm   Drainage: none  Odor: none  Pain: slight tenderness    #3: Rt outer shin    WOC photo: 9-25-23    Measurements (length x width x depth, in cm):  linear 10.5.0cm  x 4.0cm  x .skin intact  Wound Base: 100% pink/blanchable  Tunneling: NA  Undermining: NA  Palpation of the wound bed: firm  Periwound skin: intact. Firm edema. Erythema fading and not moving outside outlined  area  Color: consistent with surrounding skin   Temperature: warm   Drainage: none  Odor: none  Pain: slight tenderness    #4  Rt lateral foot  DAte of photo: 9-25-23       Measurements (length x width x depth, in cm):  2.5.cm  x 10cm  x skin intact   Wound Base: 100% pink/blanchable  Tunneling: NA  Undermining: NA  Palpation of the wound bed: firm  Periwound skin: intact. Firm edema. Erythema fading and not moving outside outlined area  Color: consistent with surrounding skin   Temperature: warm   Drainage: none  Odor: none  Pain: slight tenderness    #5: Lt medial knee  No photo taken  Measurements (length x width x depth, in cm):  3.0cm x 3.0cm x skin intact   Wound Base: 100% pink/blanchable  Tunneling: NA  Undermining: NA  Palpation of the wound bed: firm  Periwound skin: intact. Firm edema. Erythema fading and not moving outside outlined area  Color: consistent with surrounding skin   Temperature: warm   Drainage: none  Odor: none  Pain: slight tenderness  Interventions  Current support surface: atmos air  Current off-loading measures: pillows/movement  Visual inspection of wound(s) completed  Wound Care: no dressing required   Supplies: Sween-24 for  feet   Education provided: Discussed POC and no need for dressings  Discussed plan of care with Nursing    Linda Busby RN, CWOCN

## 2023-09-26 NOTE — PROGRESS NOTES
[facility]    Medicine Progress Note - Hospitalist Service    Date of Admission:  9/23/2023    Assessment & Plan   Intentional prescription drug overdose  Opioid overdose   Suicide attempt    Pt presents to the ED on 9/23 with intentional overdose on multiple prescription drugs (varying reports including Ambien, morphine, methadone, oxycontin). She is only prescribed ambien PTA. The patient's son found her on the ground, face down covered in vomit. He found a suicide note. She reported to the ED provider that she suffers from chronic pain and wanted to end her life because she cannot deal with the pain anymore. EMS administered narcan and the patient became more alert.   * U tox positive for benzos, opiates, and cannabinoids   * 9/25 Mental status now at baseline.  * 9/26 pt starting to endorse some sweats, no fever. Perhaps starting to experience some opioid withdrawal   - Admitted to inpatient  - Suicide precautions, bedside attendant   - Psychiatry consultation appreciated, will discuss opioid withdrawal treatment with psychiatry   - Hold PTA ambien, hold all other opioids and benzos for now     Rhabdomyolysis   Transaminitis 2/2 above   R upper extremity, R lower extremity weakness likely 2/2 above vs peripheral nerve palsy from prolonged down time.   CK is elevated to >22,00 since 9/24 on admission. Suspect severe rhabdo from prolonged down-time. Orthopedics consulted. No clinical indication of compartment syndrome. Transaminases are also markedly elevated likely 2/2 rhabdo.   * Pt had been endorsing RUE and RLE weakness. MRI obtained and ruled out stroke, suspect weakness is 2/2 rhabdo  * 9/26 CK is now starting to improve to 16,290. LFTs are improving.   * Renal function is stable.   - Continue aggressive IV hydration   - Intake/output   - Daily CK and LFTs   - PT/OT     Elevated blood pressure without underlying diagnosis of HTN  - As needed hydralazine 5 mg IV for systolic blood  pressure over 170 every 6 hours as needed.  - If persistently elevated, consider initiation of po anti-hypertensive      Oropharynx laceration inthe back of her throat  - Unknown etiology  - Continue to follow-up.     Depression/anxiety/insomnia  - Psychiatry consulted as noted above  - Sertraline resumed     History of HLD  Vitamin D deficiency  Osteoporosis  -No treatment found.  -Patient can follow-up with her primary doctor, after discharge.     Diet: Combination Diet Regular Diet Adult    DVT Prophylaxis: Pneumatic Compression Devices  Bragg Catheter: Not present  Lines: None     Cardiac Monitoring: ACTIVE order. Indication: Drug overdose (24 hours)  Code Status: Full Code      Clinically Significant Risk Factors        # Hypokalemia: Lowest K = 3.3 mmol/L in last 2 days, will replace as needed   # Hypocalcemia: Lowest Ca = 8.2 mg/dL in last 2 days, will monitor and replace as appropriate     # Hypoalbuminemia: Lowest albumin = 3.2 g/dL at 9/25/2023  6:33 AM, will monitor as appropriate                       Disposition Plan      Expected Discharge Date: 09/29/2023,  3:00 PM                Serena Virgen MD  Hospitalist Service  [facility]  Securely message with [employer] (more info)  Text page via [employer] Paging/Directory   ______________________________________________________________________    Interval History   Pt is feeling better this AM. She still has some pain and weakness over the right side of her body. She reports having some night sweats. No fevers.     Physical Exam   Vital Signs: Temp: 98.2  F (36.8  C) Temp src: Oral BP: 130/66 Pulse: 84   Resp: (!) 31 SpO2: 97 % O2 Device: None (Room air) Oxygen Delivery: 2 LPM  Weight: 151 lbs 4.8 oz    Constitutional: Awake, alert, cooperative, no apparent distress.  Eyes: Conjunctiva and pupils examined and normal.  HEENT: Moist mucous membranes, normal dentition.  Respiratory: Non-labored breathing   Cardiovascular: Regular rate  and rhythm  Skin: No rashes, no cyanosis, RLE 1+ edema   Musculoskeletal: No joint swelling, erythema or tenderness. Soft compartments   Neurologic: Cranial nerves 2-12 intact, normal strength and sensation.  Psychiatric: Alert, oriented to person, place and time, no obvious anxiety or depression.      Medical Decision Making       60 MINUTES SPENT BY ME on the date of service doing chart review, history, exam, documentation & further activities per the note.      Data     I have personally reviewed the following data over the past 24 hrs:    5.6  \   10.7 (L)   / 160     141 109 (H) 3.9 (L) /  103 (H)   3.3 (L) 24 0.43 (L) \     ALT: 201 (H) AST: 685 (HH) AP: 56 TBILI: 0.5   ALB: 3.3 (L) TOT PROTEIN: 6.3 (L) LIPASE: N/A       Imaging results reviewed over the past 24 hrs:   Recent Results (from the past 24 hour(s))   US Lower Extremity Venous Duplex Bilateral    Narrative    VENOUS ULTRASOUND BILATERAL LOWER EXTREMITIES  9/25/2023 3:01 PM     HISTORY: Bilateral lower extremity redness and edema    COMPARISON: None.    Technique: Color Doppler and spectral waveform analysis performed  throughout the deep veins of both lower extremities.    FINDINGS: Both common femoral, proximal great saphenous, femoral, and  popliteal veins demonstrate normal blood flow, compression, and  augmentation. Posterior tibial and peroneal veins are compressible.      Impression    IMPRESSION: Negative for DVT throughout both lower extremities.    DEREK RIVERA MD         SYSTEM ID:  [id number]   US Upper Extremity Venous Duplex Right    Narrative    US UPPER EXTREMITY VENOUS DUPLEX RIGHT  9/25/2023 3:02 PM     HISTORY: DVT rule out    COMPARISON: None.    TECHNIQUE: Color Doppler and spectral waveform analysis performed  throughout the deep and superficial veins of the right upper  extremity.    FINDINGS: The right internal jugular, subclavian, axillary, and  brachial veins demonstrate normal blood flow, compression (where  applicable),  and augmentation. Cephalic and basilic veins are  compressible. Contralateral left internal jugular and subclavian veins  are patent.      Impression    IMPRESSION: Negative for DVT in the right upper extremity.    DEREK RIVERA MD         SYSTEM ID:  [id number]   MR Brain w/o & w Contrast    Narrative    EXAM: MR BRAIN W/O and W CONTRAST  LOCATION: [facility]  DATE/TIME: 9/25/2023 6:28 PM CDT    INDICATION: R sided weakness with noted RUE ataxia  COMPARISON: CT head 09/23/2023.  CONTRAST: 7mL Gadavist  TECHNIQUE: Routine multiplanar multisequence head MRI without and with intravenous contrast.    FINDINGS:    INTRACRANIAL CONTENTS: No diffusion restriction to suggest acute/subacute ischemia. No mass effect, acute hemorrhage, or extra-axial collection. Scant scattered nonspecific T2/FLAIR hyperintensities within the cerebral white matter most consistent with   chronic microvascular ischemic change. Normal ventricles and sulci. Normal position of the cerebellar tonsils. No pathologic contrast enhancement.    SELLA: No abnormality accounting for technique.    OSSEOUS STRUCTURES/SOFT TISSUES: Normal marrow signal. The major intracranial vascular flow voids are maintained.    ORBITS: No visible intraorbital abnormality.     SINUSES/MASTOIDS: Mild scattered paranasal sinus mucosal thickening. Suspect small fluid level in the right maxillary sinus, which can be seen with acute sinusitis. No middle ear or mastoid effusion.      Impression    IMPRESSION:  1.  No acute ischemia, mass/mass effect, or abnormal intracranial enhancement.   2.  Paranasal sinus mucosal thickening with suspected fluid level in the right maxillary sinus, which can be seen with acute sinusitis in the appropriate clinical context.

## 2023-09-26 NOTE — PROGRESS NOTES
Initial Psychiatric Consult   Consult date: September 25, 2023         Reason for Consult, requesting source:      intentional drug overdose     Requesting source: Wen Parker    Labs and imaging reviewed. Patient seen and evaluated by Karley Piña MD          HPI:     This is a 63-year-old female admitted on 9/23/2023 in the setting of overdose on number 9 tablets of morphine ER 60 mg, number 5 tablets of OxyContin 30 mg and an unknown number of Ambien.  Patient's son found her down, as she had been staying with him.  She was covered in her own vomit, which is described as coffee-ground like emesis.  EMS were activated, and patient apparently revived once Narcan was given at the scene.  Patient has chronic pain.   She apparently had a CK greater than 22,000.  CT head was negative.  Patient had a transaminitis, with AST climbing to almost 1100.  Acetaminophen level was negative.    Patient seen today after reviewing the chart.  We had a lengthy conversation.  She is very open about the fact that she overdosed on her opiates.  She actually has not been prescribed opiates for many years, but had so many opiates stockpiled, that she has been taking them consistently for years now.  Most recently, she has been taking morphine ER a total of 30 mg/day.  She had not been taking OxyContin, she only did this when she OD'd.  It was difficult for me to tell whether she has been taking methadone consistently, or just when she OD'd.  She states that she took 60 mg total of methadone when she OD'd.    Patient agrees that she needs inpatient psychiatric hospitalization.  She did asked to have her Ambien back, because she has been on this for years        Past Psychiatric History:   From prior psychiatric note found from 2008, the patient has a history of cutting her wrist at age 17 and attempted suicide.  This note stated that she had been in therapy on and off.  She had a diagnosis of attention deficit disorder in  the past and had been taking Ritalin at that time.  She is also had trials of Zoloft and Prozac.    Patient states that she had been on Zoloft and Wellbutrin until 2 months ago, when she stopped taking them abruptly.  She states that she feels like she spiraled in terms of her mental health because of that.        Substance Use and History:      I see no prescriptions for opiate analgesia, or any controlled substances aside from Ambien this year on PDMP.        Past Medical History:   PAST MEDICAL HISTORY:   Past Medical History:   Diagnosis Date    Depression     Erythromelalgia (H)        PAST SURGICAL HISTORY: No past surgical history on file.          Family History:   FAMILY HISTORY: No family history on file.    Family Psychiatric History:         Social History:   SOCIAL HISTORY:   Social History     Tobacco Use    Smoking status: Never    Smokeless tobacco: Never   Substance Use Topics    Alcohol use: Not on file       Patient recently broke up with her partner of 25 years and went back with her ex- who she  30 years ago.  Then it sounds like she broke up with the ex-, and was considering renting a room from the ex-boyfriend.  Family is not in support of this.         Physical ROS:   The 10 point Review of Systems is negative other than noted in the HPI or here.           Medications:      polyethylene glycol  17 g Oral Daily    sodium chloride (PF)  3 mL Intracatheter Q8H              Allergies:   No Known Allergies       Labs:     Recent Results (from the past 48 hour(s))   Basic metabolic panel    Collection Time: 09/24/23  5:59 PM   Result Value Ref Range    Sodium 134 (L) 136 - 145 mmol/L    Potassium 4.3 3.4 - 5.3 mmol/L    Chloride 102 98 - 107 mmol/L    Carbon Dioxide (CO2) 24 22 - 29 mmol/L    Anion Gap 8 7 - 15 mmol/L    Urea Nitrogen 9.8 8.0 - 23.0 mg/dL    Creatinine 0.47 (L) 0.51 - 0.95 mg/dL    Calcium 8.4 (L) 8.8 - 10.2 mg/dL    Glucose 100 (H) 70 - 99 mg/dL    GFR  Estimate >90 >60 mL/min/1.73m2   CK total    Collection Time: 09/24/23  5:59 PM   Result Value Ref Range    CK >22,000 (HH) 26 - 192 U/L   Basic metabolic panel    Collection Time: 09/25/23 12:05 AM   Result Value Ref Range    Sodium 135 (L) 136 - 145 mmol/L    Potassium 4.1 3.4 - 5.3 mmol/L    Chloride 104 98 - 107 mmol/L    Carbon Dioxide (CO2) 24 22 - 29 mmol/L    Anion Gap 7 7 - 15 mmol/L    Urea Nitrogen 6.8 (L) 8.0 - 23.0 mg/dL    Creatinine 0.46 (L) 0.51 - 0.95 mg/dL    Calcium 8.3 (L) 8.8 - 10.2 mg/dL    Glucose 101 (H) 70 - 99 mg/dL    GFR Estimate >90 >60 mL/min/1.73m2   CK total    Collection Time: 09/25/23 12:05 AM   Result Value Ref Range    CK >22,000 (HH) 26 - 192 U/L   Basic metabolic panel    Collection Time: 09/25/23  6:33 AM   Result Value Ref Range    Sodium 136 136 - 145 mmol/L    Potassium 3.8 3.4 - 5.3 mmol/L    Chloride 105 98 - 107 mmol/L    Carbon Dioxide (CO2) 24 22 - 29 mmol/L    Anion Gap 7 7 - 15 mmol/L    Urea Nitrogen 5.5 (L) 8.0 - 23.0 mg/dL    Creatinine 0.48 (L) 0.51 - 0.95 mg/dL    Calcium 8.2 (L) 8.8 - 10.2 mg/dL    Glucose 94 70 - 99 mg/dL    GFR Estimate >90 >60 mL/min/1.73m2   CK total    Collection Time: 09/25/23  6:33 AM   Result Value Ref Range    CK >22,000 (HH) 26 - 192 U/L   INR    Collection Time: 09/25/23  6:33 AM   Result Value Ref Range    INR 1.07 0.85 - 1.15   Hepatic panel    Collection Time: 09/25/23  6:33 AM   Result Value Ref Range    Protein Total 5.8 (L) 6.4 - 8.3 g/dL    Albumin 3.2 (L) 3.5 - 5.2 g/dL    Bilirubin Total 0.2 <=1.2 mg/dL    Alkaline Phosphatase 53 35 - 104 U/L     (HH) 0 - 45 U/L     (H) 0 - 50 U/L    Bilirubin Direct <0.20 0.00 - 0.30 mg/dL   Comprehensive metabolic panel    Collection Time: 09/26/23 11:00 AM   Result Value Ref Range    Sodium 141 135 - 145 mmol/L    Potassium 3.3 (L) 3.4 - 5.3 mmol/L    Carbon Dioxide (CO2) 24 22 - 29 mmol/L    Anion Gap 8 7 - 15 mmol/L    Urea Nitrogen 3.9 (L) 8.0 - 23.0 mg/dL    Creatinine  "0.43 (L) 0.51 - 0.95 mg/dL    GFR Estimate >90 >60 mL/min/1.73m2    Calcium 8.5 (L) 8.8 - 10.2 mg/dL    Chloride 109 (H) 98 - 107 mmol/L    Glucose 103 (H) 70 - 99 mg/dL    Alkaline Phosphatase 56 35 - 104 U/L     (HH) 0 - 45 U/L     (H) 0 - 50 U/L    Protein Total 6.3 (L) 6.4 - 8.3 g/dL    Albumin 3.3 (L) 3.5 - 5.2 g/dL    Bilirubin Total 0.5 <=1.2 mg/dL   CK total    Collection Time: 09/26/23 11:00 AM   Result Value Ref Range    CK 16,290 (HH) 26 - 192 U/L   CBC with platelets    Collection Time: 09/26/23 11:00 AM   Result Value Ref Range    WBC Count 5.6 4.0 - 11.0 10e3/uL    RBC Count 3.66 (L) 3.80 - 5.20 10e6/uL    Hemoglobin 10.7 (L) 11.7 - 15.7 g/dL    Hematocrit 32.9 (L) 35.0 - 47.0 %    MCV 90 78 - 100 fL    MCH 29.2 26.5 - 33.0 pg    MCHC 32.5 31.5 - 36.5 g/dL    RDW 12.3 10.0 - 15.0 %    Platelet Count 160 150 - 450 10e3/uL          Physical and Psychiatric Examination:     BP (!) 142/93 (BP Location: Left arm)   Pulse 83   Temp 98.7  F (37.1  C) (Oral)   Resp 18   Ht 1.702 m (5' 7\")   Wt 68.6 kg (151 lb 4.8 oz)   SpO2 100%   BMI 23.70 kg/m    Weight is 151 lbs 4.8 oz  Body mass index is 23.7 kg/m .    Physical Exam:  I have reviewed the physical exam as documented by by the medical team and agree with findings and assessment and have no additional findings to add at this time.    Mental Status Exam:    Appearance: awake, alert, appeared as age stated, and slightly unkempt  Attitude:  cooperative  Eye Contact:  good  Mood:  depressed  Affect:  mood congruent  Speech:  clear, coherent  Language: Fluent in english   Psychomotor Behavior:  no evidence of tardive dyskinesia, dystonia, or tics  Thought Process:  logical  Associations:  no loose associations  Thought Content:  no evidence of psychotic thought, no auditory hallucinations present, no visual hallucinations present, and patient presented with a suicide attempt, and wishes that it would have worked  Insight:  fair  Judgement:  " fair  Oriented to:  time, person, and place  Attention Span and Concentration:  intact  Recent and Remote Memory:  intact  Fund of Knowledge: Appropriate   Gait and Station:                DSM-5 Diagnosis:   Overdose on opiate analgesia, intentional, suicide attempt, subsequent encounter    Mood disorder due to a general medical condition    Chronic pain    Erythromelalgia    Rhabdomyolysis    Major depressive disorder, recurrent, severe              Assessment:     This is a 63-year-old female who reports depression as long as she can remember.  This undoubtedly is made worse by her chronic erythromelalgia, and the pain that goes along with this.  It also has a huge impact on her functioning and quality of life.  She abruptly stopped taking her antidepressants 2 months ago, and spiraled.  She also states that she has been taking opiate analgesia that she had been stockpiling for many years.  She had saved enough medication, that it sounds like she has been using stockpiled medications for her pain for several years.  I got confused about the timeline.  Basically, she has been taking morphine ER a total of 30 mg p.o. daily.  She does not feel like she is in opiate withdrawal now, but also took as much as 60 mg of methadone when she overdosed.    Patient is agreeable to going to an inpatient psychiatric hospitalization.  Would recommend that we hold off on Zoloft and Wellbutrin, in favor of Cymbalta which actually may give her some benefit for her pain.  Patient continues to have significant rhabdomyolysis.  Would recommend that we hold off on the trial of Cymbalta until the rhabdomyolysis has substantially cleared.    Patient states she is beginning to feel better.  She would like to discuss going back on opiate analgesia.  I indicated to her that a pain consult may be helpful in helping her ascertain what is most appropriate for her.    She is still feeling depressed.  I did talk to her about going on Cymbalta,  which she stated was not helpful for her in the past.  We talked about the different options in terms of restarting medications.  Of the options, she prefers to restart both Zoloft and Wellbutrin together, because this was most helpful for her.  We discussed an initial starting dose, and I will follow along.  Have also restarted patient's Ambien.            Summary of Recommendations:   1.  We will discontinue hold.  Patient is willing to go to inpatient psychiatry voluntarily.  If she changes her mind, place her back on a hold please, and we will petition for commitment.    2.  Please continue with the one-to-one sitter.  Patient does not really like it, but she wishes that her suicide attempt would have worked.    3.  Have written for Wellbutrin  mg p.o. every morning and Zoloft 25 mg p.o. every morning per patient preference.  We will titrate these medications to effect as indicated/tolerated.    4.  Once patient is medically clear, we will begin looking for an inpatient psychiatric bed.    5.  Have written for Ambien 10 mg p.o. nightly patient's chronic home dose.    6.  Patient would like a pain consult to help decide which, if any, opiate analgesia she would like to continue taking.      Karley Piña MD  Consult/Liaison Psychiatry and Addiction Medicine  [facility]

## 2023-09-26 NOTE — CONSULTS
9/26/2023    CD Consult acknowledged and closing.   Per EHR note:No history of alcohol use disorder or substance use disorder. Pt has been seen by psych and will be transferring to [facility].   CD consult can be ordered when pt is stable and pt would like an evaluation and referrals to CD treatment.    KARLOS Vargas

## 2023-09-26 NOTE — PROGRESS NOTES
"Patient slept through most of the shift, gave melatonin to help. Orientations: x4. Denies thoughts of suicide and states she doesn't plan to try again because it \"wouldn't be worth it\"   Vitals: VSS on RA >90%.   Pain: baseline patient has burning sensation in hands and feet. Has chronic pain, but doesn't report anything specific.   Tele: NSR  Lines/Drains: R-PIV infusing 250 mL/hr NS  Skin/Wounds: Has several bruises and scraps from initial fall - right shoulder, elbow, and shin have semi-firm edema and are warm to the touch. Wounds also on R-hip and L-knee. Unchanged during shift, redness has gone down.   Abrasion midline forehead.   Hands and feet are swollen and red due to chronic illness  GI/: continent of bowel and bladder. Had several BM's today and regrets the earlier miralax. Poor appetite, states it's been poor the last few weeks. Feels some stomach discomfort - states maybe bloatig.   Labs: AK >22,000  Ambulation/Assist: GB x1  Sleep Quality: sleeping between cares,   Plan: seeking for her to receive inpatient care     "

## 2023-09-26 NOTE — CONSULTS
Care Management Initial Consult    General Information  Assessment completed with: VM-chart review,    Type of CM/SW Visit: Initial Assessment    Primary Care Provider verified and updated as needed:     Readmission within the last 30 days:        Reason for Consult: discharge planning, mental health concerns  Advance Care Planning:            Communication Assessment  Patient's communication style: spoken language (English or Bilingual)    Hearing Difficulty or Deaf: no   Wear Glasses or Blind: no    Cognitive  Cognitive/Neuro/Behavioral: WDL  Level of Consciousness: alert  Arousal Level: opens eyes spontaneously  Orientation: oriented x 4  Mood/Behavior: flat affect  Best Language: 0 - No aphasia  Speech: slow    Living Environment:   People in home: child(nithya), adult     Current living Arrangements: house      Able to return to prior arrangements:         Family/Social Support:  Care provided by: self  Provides care for: no one  Marital Status: Single  Children          Description of Support System: Involved    Support Assessment: Adequate family and caregiver support    Current Resources:   Patient receiving home care services: No     Community Resources:    Equipment currently used at home: none  Supplies currently used at home:      Employment/Financial:  Employment Status: retired        Financial Concerns: No concerns identified   Referral to Financial Worker: No       Does the patient's insurance plan have a 3 day qualifying hospital stay waiver?  No    Lifestyle & Psychosocial Needs:  Social Determinants of Health     Food Insecurity: Not on file   Depression: Not on file   Housing Stability: Not on file   Tobacco Use: Low Risk  (9/23/2023)    Patient History     Smoking Tobacco Use: Never     Smokeless Tobacco Use: Never     Passive Exposure: Not on file   Financial Resource Strain: Not on file   Alcohol Use: Not on file   Transportation Needs: Not on file   Physical Activity: Not on file   Interpersonal  Safety: Not on file   Stress: Not on file   Social Connections: Not on file       Functional Status:  Prior to admission patient needed assistance:              Mental Health Status:  Mental Health Status: Current Concern  Mental Health Management: Medication    Chemical Dependency Status:  Chemical Dependency Status: No Current Concerns             Values/Beliefs:  Spiritual, Cultural Beliefs, Quaker Practices, Values that affect care: no               Additional Information:  SW consult for discharge planning. SW reviewed chart. Psychiatry, Dr Piña, is recommending a transfer to in-pt mental health. Pt has expressed her agreement to this plan to Dr Piña. Charge nurse will call central intake when pt is medially ready to place pt's name on wait list for a bed. SW/RN CC will follow, as appropriate.     NELSY Farrell, [facility]  [phone] Desk phone  [phone] Cell/text (Preferred)  [facility]

## 2023-09-26 NOTE — CONSULTS
IP MH Referral Acuity Rating Score (RARS)    LMHP complete at referral to IP MH, with DEC; and, daily while awaiting IP MH placement. Call score to PPS.  CRITERIA SCORING   New 70 HH and Involuntary for IP MH (not adolescent) 0/1   Boarding over 24 hours 1/1   Vulnerable adult at least 55+ with multiple co morbidities; or, Patient age 11 or under 1/1   Suicide ideation without relief of precipitating factors 1/1   Current plan for suicide 1/1   Current plan for homicide 0/1   Imminent risk or actual attempt to seriously harm another without relief of factors precipitating the attempt 0/1   Severe dysfunction in daily living (ex: complete neglect for self care, extreme disruption in vegetative function, extreme deterioration in social interactions) 1/1   Recent (last 2 weeks) or current physical aggression in the ED 0/1   Restraints or seclusion episode in ED 0/1   Verbal aggression, agitation, yelling, etc., while in the ED 0/1   Active psychosis with psychomotor agitation or catatonia 0/1   Need for constant or near constant redirection (from leaving, from others, etc).  0/1   Intrusive or disruptive behaviors 0/1   TOTAL Acuity Total Score: 5

## 2023-09-27 NOTE — PROGRESS NOTES
Current condition (current mood & behavior): Calm, cooperative and pleasant  Sitter present: yes  Every 15 minute documentation by NA/RN completed for Shift: yes  Room safety Suicide Checklist completed in Epic: yes  Patient's color of severity (suicide scale): YELLOW  Order for psych consult placed (if appropriate): yes  Suicide care plan added: yes      Amaya Loyola RN

## 2023-09-27 NOTE — PLAN OF CARE
ORIENTATION/NEURO: A/o x 4. R sided weakness is improving, strength on right side 4/5.  VITALS/TELE: VSS on RA. Tele NSR.  PAIN: Discomfort to right calf. Declined need for pain meds.  RESP: LS CTA.  DIET: Regular diet. Good intake.  GI/: WNL  LINES/DRAINS: L PIV infusing NS @ 250 ml/hr  [place]S: [place] 7[address] (trending down), K+ 3.3- started replacment protocol and one dose given. K+ recheck 3.7  SKIN: Reddened areas to right calf, shoulder, elbow, and hip improving. Right calf firm. Abrasion on forehead ALEC  ASSIST/AMBULATION: SBA  FALL RISK: yes  PLAN/DISPOSITION: C status discontinued. Continue suicide precautions and 1:1.       Goal Outcome Evaluation: progressing

## 2023-09-27 NOTE — PROGRESS NOTES
[facility]    Medicine Progress Note - Hospitalist Service    Date of Admission:  9/23/2023    Assessment & Plan   Intentional prescription drug overdose  Opioid overdose   Suicide attempt    Pt presents to the ED on 9/23 with intentional overdose on multiple prescription drugs (varying reports including Ambien, morphine, methadone, oxycontin). She is only prescribed ambien PTA. The patient's son found her on the ground, face down covered in vomit. He found a suicide note. She reported to the ED provider that she suffers from chronic pain and wanted to end her life because she cannot deal with the pain anymore. EMS administered narcan and the patient became more alert.   * U tox positive for benzos, opiates, and cannabinoids   * 9/25 Mental status now at baseline.  * 9/26 pt starting to endorse some sweats, no fever. Perhaps starting to experience some opioid withdrawal   - Admitted to inpatient  - Suicide precautions, bedside attendant   - Psychiatry consultation appreciated, will discuss opioid withdrawal treatment with psychiatry   -pain medicine consult placed on 9/27 appreciate etheir help       Rhabdomyolysis   Transaminitis 2/2 above   R upper extremity, R lower extremity weakness likely 2/2 above vs peripheral nerve palsy from prolonged down time.   CK is elevated to >22,00 since 9/24 on admission. Suspect severe rhabdo from prolonged down-time. Orthopedics consulted. No clinical indication of compartment syndrome. Transaminases are also markedly elevated likely 2/2 rhabdo.   * Pt had been endorsing RUE and RLE weakness. MRI obtained and ruled out stroke, suspect weakness is 2/2 rhabdo  * 9/26 CK is now starting to improve to14K.  LFTs are improving.   * Renal function is stable.   - Continue aggressive IV hydration   - Intake/output   - Daily CK and LFTs   - PT/OT     Elevated blood pressure without underlying diagnosis of HTN  - As needed hydralazine 5 mg IV for systolic blood  "pressure over 170 every 6 hours as needed.  - If persistently elevated, consider initiation of po anti-hypertensive      Oropharynx laceration inthe back of her throat  - Unknown etiology  - Continue to follow-up.     Depression/anxiety/insomnia  - Psychiatry consulted as noted above  - Sertraline resumed     History of HLD  Vitamin D deficiency  Osteoporosis  -No treatment found.  -Patient can follow-up with her primary doctor, after discharge.     Diet: Combination Diet Regular Diet Adult; Safe Tray - with utensils    DVT Prophylaxis: Pneumatic Compression Devices  Bragg Catheter: Not present  Lines: None     Cardiac Monitoring: ACTIVE order. Indication: Electrolyte Imbalance (24 hours)- Magnesium <1.3 mg/ml; Potassium < =2.8 or > 5.5 mg/ml  Code Status: Full Code      Clinically Significant Risk Factors        # Hypokalemia: Lowest K = 3.3 mmol/L in last 2 days, will replace as needed   # Hypocalcemia: Lowest Ca = 8.4 mg/dL in last 2 days, will monitor and replace as appropriate     # Hypoalbuminemia: Lowest albumin = 3.2 g/dL at 9/25/2023  6:33 AM, will monitor as appropriate            # Overweight: Estimated body mass index is 25.03 kg/m  as calculated from the following:    Height as of this encounter: 1.702 m (5' 7\").    Weight as of this encounter: 72.5 kg (159 lb 13.3 oz).             Disposition Plan      Expected Discharge Date: 09/30/2023,  3:00 PM                Karen Saucedo MD  Hospitalist Service  [facility]  Securely message with [facility] (more info)  Text page via [employer] Paging/Directory   ______________________________________________________________________    Interval History   Pt c/o spasms in her legs from opioid withdrawal. Seen by Pain medicine team today. Started on PRN oxycodone . Psych team following. Restarted ambien PRN today. CPK improving     Physical Exam   Vital Signs: Temp: 98.9  F (37.2  C) Temp src: Oral BP: 136/76 Pulse: 83   Resp: 18 SpO2: 99 % O2 " Device: None (Room air)    Weight: 159 lbs 13.34 oz    Constitutional: Awake, alert, cooperative, no apparent distress.  Eyes: Conjunctiva and pupils examined and normal.  HEENT: Moist mucous membranes, normal dentition.  Respiratory: Non-labored breathing   Cardiovascular: Regular rate and rhythm  Skin: No rashes, no cyanosis, RLE 1+ edema   Musculoskeletal: No joint swelling, erythema or tenderness. Soft compartments   Neurologic: Cranial nerves 2-12 intact, normal strength and sensation.  Psychiatric: Alert, oriented to person, place and time, no obvious anxiety or depression.      Medical Decision Making       60 MINUTES SPENT BY ME on the date of service doing chart review, history, exam, documentation & further activities per the note.      Data     I have personally reviewed the following data over the past 24 hrs:    N/A  \   N/A   / N/A     142 109 (H) 3.0 (L) /  89   3.4 21 (L) 0.46 (L) \     ALT: 197 (H) AST: 598 (HH) AP: 52 TBILI: 0.5   ALB: 3.4 (L) TOT PROTEIN: 6.0 (L) LIPASE: N/A     INR:  1.00 PTT:  N/A   D-dimer:  N/A Fibrinogen:  N/A       Imaging results reviewed over the past 24 hrs:   No results found for this or any previous visit (from the past 24 hour(s)).

## 2023-09-27 NOTE — PLAN OF CARE
VSS on RA. A/Ox4, anxiety noted. C/o of BLE pain, pain team consult placed. LS clear, denies SOB. Tele; SR, denies CP. BLE +2 edema. NS infusing at 125ml/hr. K replaced per protocol. Suicide precautions maintained. PSC at bedside. Psych consulted. Pt transferred to station 66 w/ personal belongings. Report given to receiving RN Amaya.     Goal Outcome Evaluation:      Plan of Care Reviewed With: patient    Overall Patient Progress: improving    Problem: Suicide Risk  Goal: Absence of Self-Harm  Outcome: Progressing     Problem: Fall Injury Risk  Goal: Absence of Fall and Fall-Related Injury  Intervention: Promote Injury-Free Environment  Recent Flowsheet Documentation  Taken 9/27/2023 1360 by Eh Ng, RN  Safety Promotion/Fall Prevention:

## 2023-09-27 NOTE — PLAN OF CARE
DATE & TIME: 9/27/2023 [id number]      Cognitive Concerns/ Orientation : A&Ox4; calm, cooperative, pleasant; denies suicidal ideations   BEHAVIOR & AGGRESSION TOOL COLOR: Green  CIWA SCORE: NA   ABNL VS/O2: VSS on RA  MOBILITY: SBA  PAIN MANAGMENT: Oxycodone given for BLE pain with relief  DIET: Regular  BOWEL/BLADDER: Continent; last BM today  ABNL LAB/BG: K+ 3.4 - replaced, recheck at 1730; Cr 0.46; CK [address]; ALT//598  DRAIN/DEVICES: RFA PIV infusing NS @ 125mL/hr  TELEMETRY RHYTHM: NSR  SKIN: Dry, flaky, scattered bruising, redness BLE  TESTS/PROCEDURES: None  D/C DATE: Pending  OTHER IMPORTANT INFO: Pain Team, Psych and GI following. PT/OT following.

## 2023-09-27 NOTE — PLAN OF CARE
Goal Outcome Evaluation:  Intentional prescription drug overdose  Opioid overdose   Suicide attempt  Rhabdomyolysis   Transaminitis 2/2 above   R upper extremity, R lower extremity weakness likely 2/2 above vs peripheral nerve palsy from prolonged down time.        Orientation: A/O x4 calm, pleasant  setting on the chair and  talking with the bedside setter    Vitals/: VSS on R A denies suicidal ideations, on tele NSR    IV Access/drains: PIV  infusing NS at  125mL/hr     Diet: Regular    Mobility: Stand By Assist    GI/: Continent     Wound/Skin:flaky, scattered bruising, redness BLE     Consults: Pain Team, Psych,GI PT/OT, following with her.       Discharge Plan:  Pending       See Flow sheets for assessment

## 2023-09-27 NOTE — PROGRESS NOTES
Initial Psychiatric Consult   Consult date: September 25, 2023         Reason for Consult, requesting source:      intentional drug overdose     Requesting source: Wen Parker    Labs and imaging reviewed. Patient seen and evaluated by Karley Piña MD          HPI:     This is a 63-year-old female admitted on 9/23/2023 in the setting of overdose on number 9 tablets of morphine ER 60 mg, number 5 tablets of OxyContin 30 mg and an unknown number of Ambien.  Patient's son found her down, as she had been staying with him.  She was covered in her own vomit, which is described as coffee-ground like emesis.  EMS were activated, and patient apparently revived once Narcan was given at the scene.  Patient has chronic pain.   She apparently had a CK greater than 22,000.  CT head was negative.  Patient had a transaminitis, with AST climbing to almost 1100.  Acetaminophen level was negative.    Patient seen today after reviewing the chart.  We had a lengthy conversation.  She is very open about the fact that she overdosed on her opiates.  She actually has not been prescribed opiates for many years, but had so many opiates stockpiled, that she has been taking them consistently for years now.  Most recently, she has been taking morphine ER a total of 30 mg/day.  She had not been taking OxyContin, she only did this when she OD'd.  It was difficult for me to tell whether she has been taking methadone consistently, or just when she OD'd.  She states that she took 60 mg total of methadone when she OD'd.    Patient agrees that she needs inpatient psychiatric hospitalization.  She did asked to have her Ambien back, because she has been on this for years    9/27/2023: Patient seen today for follow-up.  Discussed with Dr. Saucedo.  Patient continues with transaminitis, so psychiatric medications were held last night.  She reports she did not sleep at all, and feels like she is in opiate withdrawal.  Patient has oxycodone  written with a total of 20 mg available per 24 hours, equivalent to the morphine ER 30 mg daily she stated she was taking in the community.    I discussed holding on the patient's antidepressants with her.  She states that she feels pretty depressed, and does not feel equipped to dealing with her life at this point.  She continues to feel she needs an inpatient psychiatric hospitalization.  I told her we would restart her Ambien tonight and discussed with Dr. Saucedo.        Past Psychiatric History:   From prior psychiatric note found from 2008, the patient has a history of cutting her wrist at age 17 and attempted suicide.  This note stated that she had been in therapy on and off.  She had a diagnosis of attention deficit disorder in the past and had been taking Ritalin at that time.  She is also had trials of Zoloft and Prozac.    Patient states that she had been on Zoloft and Wellbutrin until 2 months ago, when she stopped taking them abruptly.  She states that she feels like she spiraled in terms of her mental health because of that.        Substance Use and History:      I see no prescriptions for opiate analgesia, or any controlled substances aside from Ambien this year on PDMP.        Past Medical History:   PAST MEDICAL HISTORY:   Past Medical History:   Diagnosis Date    Depression     Erythromelalgia (H)        PAST SURGICAL HISTORY: No past surgical history on file.          Family History:   FAMILY HISTORY: No family history on file.    Family Psychiatric History:         Social History:   SOCIAL HISTORY:   Social History     Tobacco Use    Smoking status: Never    Smokeless tobacco: Never   Substance Use Topics    Alcohol use: Not on file       Patient recently broke up with her partner of 25 years and went back with her ex- who she  30 years ago.  Then it sounds like she broke up with the ex-, and was considering renting a room from the ex-boyfriend.  Family is not in support of  this.         Physical ROS:   The 10 point Review of Systems is negative other than noted in the HPI or here.           Medications:      gabapentin  300 mg Oral TID    hydrOXYzine  25 mg Oral Q6H    Or    hydrOXYzine  50 mg Oral Q6H    methocarbamol  500 mg Oral TID    oxymetazoline  2 spray Both Nostrils BID    polyethylene glycol  17 g Oral Daily    pramox-pe-glycerin-petrolatum   Rectal BID    sodium chloride (PF)  3 mL Intracatheter Q8H              Allergies:   No Known Allergies       Labs:     Recent Results (from the past 48 hour(s))   Comprehensive metabolic panel    Collection Time: 09/26/23 11:00 AM   Result Value Ref Range    Sodium 141 135 - 145 mmol/L    Potassium 3.3 (L) 3.4 - 5.3 mmol/L    Carbon Dioxide (CO2) 24 22 - 29 mmol/L    Anion Gap 8 7 - 15 mmol/L    Urea Nitrogen 3.9 (L) 8.0 - 23.0 mg/dL    Creatinine 0.43 (L) 0.51 - 0.95 mg/dL    GFR Estimate >90 >60 mL/min/1.73m2    Calcium 8.5 (L) 8.8 - 10.2 mg/dL    Chloride 109 (H) 98 - 107 mmol/L    Glucose 103 (H) 70 - 99 mg/dL    Alkaline Phosphatase 56 35 - 104 U/L     (HH) 0 - 45 U/L     (H) 0 - 50 U/L    Protein Total 6.3 (L) 6.4 - 8.3 g/dL    Albumin 3.3 (L) 3.5 - 5.2 g/dL    Bilirubin Total 0.5 <=1.2 mg/dL   CK total    Collection Time: 09/26/23 11:00 AM   Result Value Ref Range    CK 16,290 (HH) 26 - 192 U/L   CBC with platelets    Collection Time: 09/26/23 11:00 AM   Result Value Ref Range    WBC Count 5.6 4.0 - 11.0 10e3/uL    RBC Count 3.66 (L) 3.80 - 5.20 10e6/uL    Hemoglobin 10.7 (L) 11.7 - 15.7 g/dL    Hematocrit 32.9 (L) 35.0 - 47.0 %    MCV 90 78 - 100 fL    MCH 29.2 26.5 - 33.0 pg    MCHC 32.5 31.5 - 36.5 g/dL    RDW 12.3 10.0 - 15.0 %    Platelet Count 160 150 - 450 10e3/uL   Potassium    Collection Time: 09/26/23  5:00 PM   Result Value Ref Range    Potassium 3.7 3.4 - 5.3 mmol/L   Comprehensive metabolic panel    Collection Time: 09/27/23  5:42 AM   Result Value Ref Range    Sodium 142 135 - 145 mmol/L    Potassium  "3.3 (L) 3.4 - 5.3 mmol/L    Carbon Dioxide (CO2) 21 (L) 22 - 29 mmol/L    Anion Gap 12 7 - 15 mmol/L    Urea Nitrogen 3.0 (L) 8.0 - 23.0 mg/dL    Creatinine 0.46 (L) 0.51 - 0.95 mg/dL    GFR Estimate >90 >60 mL/min/1.73m2    Calcium 8.4 (L) 8.8 - 10.2 mg/dL    Chloride 109 (H) 98 - 107 mmol/L    Glucose 89 70 - 99 mg/dL    Alkaline Phosphatase 52 35 - 104 U/L     (HH) 0 - 45 U/L     (H) 0 - 50 U/L    Protein Total 6.0 (L) 6.4 - 8.3 g/dL    Albumin 3.4 (L) 3.5 - 5.2 g/dL    Bilirubin Total 0.5 <=1.2 mg/dL   CK total    Collection Time: 09/27/23  5:42 AM   Result Value Ref Range    CK 14,400 (HH) 26 - 192 U/L   INR    Collection Time: 09/27/23  5:42 AM   Result Value Ref Range    INR 1.00 0.85 - 1.15   Potassium    Collection Time: 09/27/23 11:53 AM   Result Value Ref Range    Potassium 3.4 3.4 - 5.3 mmol/L   Urine Creatinine for Drug Screen Panel    Collection Time: 09/27/23  1:02 PM   Result Value Ref Range    Creatinine Urine for Drug Screen 14 mg/dL   Cannabinoids qualitative urine    Collection Time: 09/27/23  1:02 PM   Result Value Ref Range    Cannabinoids Urine Screen Negative Screen Negative          Physical and Psychiatric Examination:     /66 (BP Location: Right arm)   Pulse 90   Temp 98.7  F (37.1  C) (Oral)   Resp 18   Ht 1.702 m (5' 7\")   Wt 72.5 kg (159 lb 13.3 oz)   SpO2 97%   BMI 25.03 kg/m    Weight is 159 lbs 13.34 oz  Body mass index is 25.03 kg/m .    Physical Exam:  I have reviewed the physical exam as documented by by the medical team and agree with findings and assessment and have no additional findings to add at this time.    Mental Status Exam:    Appearance: awake, alert, appeared as age stated, alert, and cooperative  Attitude:  cooperative  Eye Contact:  good  Mood:  depressed  Affect:  mood congruent  Speech:  clear, coherent  Language: Fluent in english   Psychomotor Behavior:  no evidence of tardive dyskinesia, dystonia, or tics  Thought Process:  " logical  Associations:  no loose associations  Thought Content:  no evidence of psychotic thought, no auditory hallucinations present, no visual hallucinations present, and patient presented with a suicide attempt, and wishes that it would have worked  Insight:  fair  Judgement:  fair  Oriented to:  time, person, and place  Attention Span and Concentration:  intact  Recent and Remote Memory:  intact  Fund of Knowledge: Appropriate   Gait and Station:                DSM-5 Diagnosis:   Overdose on opiate analgesia, intentional, suicide attempt, subsequent encounter    Mood disorder due to a general medical condition    Chronic pain    Erythromelalgia    Rhabdomyolysis    Major depressive disorder, recurrent, severe              Assessment:     This is a 63-year-old female who reports depression as long as she can remember.  This undoubtedly is made worse by her chronic erythromelalgia, and the pain that goes along with this.  It also has a huge impact on her functioning and quality of life.  She abruptly stopped taking her antidepressants 2 months ago, and spiraled.  She also states that she has been taking opiate analgesia that she had been stockpiling for many years.  She had saved enough medication, that it sounds like she has been using stockpiled medications for her pain for several years.  I got confused about the timeline.  Basically, she has been taking morphine ER a total of 30 mg p.o. daily.  She does not feel like she is in opiate withdrawal now, but also took as much as 60 mg of methadone when she overdosed.    Patient is agreeable to going to an inpatient psychiatric hospitalization.  Would recommend that we hold off on Zoloft and Wellbutrin, in favor of Cymbalta which actually may give her some benefit for her pain.  Patient continues to have significant rhabdomyolysis.  Would recommend that we hold off on the trial of Cymbalta until the rhabdomyolysis has substantially cleared.    9/26/2023: Patient  states she is beginning to feel better.  She would like to discuss going back on opiate analgesia.  I indicated to her that a pain consult may be helpful in helping her ascertain what is most appropriate for her.    She is still feeling depressed.  I did talk to her about going on Cymbalta, which she stated was not helpful for her in the past.  We talked about the different options in terms of restarting medications.  Of the options, she prefers to restart both Zoloft and Wellbutrin together, because this was most helpful for her.  We discussed an initial starting dose, and I will follow along.  Have also restarted patient's Ambien.    9/27/2023: Patient has opiate analgesia available now, and does state that she is in opiate withdrawal.  She did not sleep at all last night, and is really hoping to get her Ambien.  I have restarted as a as needed since she is also getting some other medications that she was not on yesterday.    We will hold on restarting her Wellbutrin and her Zoloft as transaminases and CK continue to trend down.  She still has an AST of 598, and a CK of 14,000.  Renal function is preserved.            Summary of Recommendations:   1.  We will discontinue hold.  Patient is willing to go to inpatient psychiatry voluntarily.  If she changes her mind, place her back on a hold please, and we will petition for commitment.    2.  Please continue with the one-to-one sitter.  Patient does not really like it, but she wishes that her suicide attempt would have worked.    3.  Have restarted Ambien 10 mg p.o. nightly as needed for insomnia.  This reflects the patient's home dose    4.  We will hold off on antidepressants while transaminases and CK trend down.    5.  Patient will require an inpatient psychiatric bed.  We will continue to treat while she is here on this unit, awaiting transfer to psychiatry.    Karley Piña MD  Consult/Liaison Psychiatry and Addiction Medicine  [facility]

## 2023-09-27 NOTE — PROVIDER NOTIFICATION
Dr. Saucedo paged [phone]: Pt is asking if she can resume her PTA Afrin nose spray. I didnt see the nose spray in the PTA meds but she says she takes it daily. She is also complaining of hemorrhoids and is asking about preparation H.

## 2023-09-27 NOTE — PROGRESS NOTES
Transfer in/out    Transfer to [facility] from [facility].    Report given to/received from Eh Ng RN    Brief note about patient status upon transfer: Patient is A&Ox4; denies any suicidal ideations. VSS on RA. Patient c/o BLE pain - Oxycodone given x1 at 1330. Up with a SBA.    Code status: Full Code  Skin: Dry and flaky; BLE redness and swelling  Fall Risk: Yes- Department fall risk interventions implemented.  Isolation: None  Patient belongings: Shoes, glasses. Ipad, ear pods, cellphone  If patient is a 72 hour hold/Commitment are belongings removed from room and locked up? NA  Medication drips upon transfer: NS at 125mL/hr  Sign and held orders released? NA

## 2023-09-27 NOTE — PROGRESS NOTES
Contacted by pharmacy regarding bupropion, sertraline, and zolpidem, held for LFT elevation, consider restart pending LFT's in am.

## 2023-09-27 NOTE — CONSULTS
"  [facility] ACUTE PAIN SERVICE CONSULTATION   [facility]   Text Page Pain Via [facility]    Date of Admission:  9/23/2023  Date of Consult (When I saw the patient): 09/27/23  Physician requesting consult: Karen Saucedo MD   Service:Hospitalist  Reason for consult:  pain management in context of opioid overdose      Assessment/Plan:     Gabriela Gray is a 63 year old female who was admitted on 9/23/2023.   I was asked to see the patient for  pain management  with pain type  musculoskeletal./neuropathic related to rhabdomyolysis, chronic pain  in feet 2/2 Erythromelalgia in setting of suicide attempt with use of opioids she had stocked piled over the years . History long history of pain in feet following in pain clinic 15 years ago. She had \"stocked piled methadone and Morphine CR 60 mg has been on and off these medications for pain over the years. Over last 3 months she has been taking through the stock pile,  Methadone 10 mg ad and cutting CR Morphine and taking about 30 mg/day.   Burning pain continues in feet and acutely from Rhabdomyolysis in calves which is cramping with some jerking in legs. . Describes pain as with numeric rating of 8-10/10. The patient denies shortness of breath, mental status changes, chest pain. The patient does not smoke and has a chemical dependency history. Opioid tolerance is estimated  moderate to high.  In this setting it is reasonable to treat the rhabdomyolysis pain with prn Oxycodone over 4 days. The continuation of methadone for pain in this setting is not indicated. Advise ongoing mental health support while her with anticipated in patient admission.  If psychiatry indicates that methadone is the best treatment for her for AMADOR it can be managed with this service. Buprenorphine is another good option for OUD which is her primary substance misuse.  Will also order confirmatory metabolic urine screen    Opioid Induced Respiratory Depression " "Risk Assessment:  (Low 0-1; Moderate 2-4; or High >4 or >/= 3 if two of the risk factors are age > 60 and opioid naive) due to the following risk factors: BRUNA, COPD/Asthma/pulmonary disease, CHF, renal dysfunction, hepatic dysfunction, Obesity, Smoker, Age>60, >2 opioid therapies, concomitant CNS depressants, opioid naive status, or post surgical ?   Chronic opioid use = 60 mg MME, opioid used this past 24 hours 0.   PLAN:     1)  Pain is consistent with musculoskeletal./neuropathic, secondary to rhabdomyolysis and chronic pain  in feet 2/2 Erythromelalgia. Treatment plan includes multimodal pain approach, medications as listed below, reviewed.  Discharge medications, advised keeping track of doses, educated on tapering off as pain improves, watch for constipation and stool softeners, no driving or alcohol with opioids, store medications in a safe place, advised to take no more than the prescribed dose as increased doses may cause respiratory depression or death. Patient is understanding of the plan. All questions and concerns addressed to patient's satisfaction.   2)Multimodal Medication Therapy  Topical:    and Lidocaine cream 4% Apply to  legs and feet.   Adjuvants: CrCl is 139.5  mg/dl and Tylenol prn \", Hydroxyzine 25-50 mg every 6 hours\" Gabapentin 300 mg tid, Muscle relaxer's methocarbamol 500 mg tid . \"  Antidepressants/anxiolytics: none see mental health note   Opioids:Oxycodone 5 mg every 6 hrs prn x 4 day then discontinue   IV Pain medication:  none   3)Non-medication interventions- Ice, Heat, physical therapy, distraction aroma therapy, menthol cream   4)Constipation Prophylaxis- senna and miralax  . Continue to monitor.   5) Follow up   Urine metabolic screen today  -acute pain team will follow.   -Opioid prescriber has been none . PCP is No Ref-Primary, Physician.    -Discharge Recommendations - We recommend prescribing the following at the time of discharge:   No opioids at discharge   Topical menthol " cream and lidocaine gel  Gabapentin 300 mg tid      Disposition: in patient psyche    Prescribing at discharge: hospialist        History of Present Illness (HPI):       Gabriela Gray is a 63 year old  female with a past medical history of Erythromelalgia, chronic feet pain, depression and presents with following a suicide attempt with opioids (she stocked piled).  The patient reports pain that is located  in legs and  feet.  Feet pain is chronic described as burning, and leg are acute from rhabdomyolysis and is cramping. Alleviating factors include fan to feet. Lidocaine cream, methadone and Morphine. Exacerbates include walking, standing.  Current pain is rated at 9/10 and goal is 4/10.  Per [place]  review no opioids prescribed. The patient has a  moderate ( estimate) opioid tolerance. Opioid induced side effects including sedation yes, respiratory suppression yes, nausea no, and constipation no. The patient does not history of BRUNA, and a positive substance use disorder.     Discussed multimodal interventions, interventions other than systemic pharmacologic treatments for chronic pain.    Review of medical record/Summary of labs and care everywhere as part of comprehensive review.      Past pain treatments have include  pain clinic 15 years ago, gabapentin, lidocaine cream aspercreme, methadone, Morphine     UDSnone        MN -pulled from system on 09/27/23. No opioids. Does not supports analgesic of opioid use.       Medical History   has a past medical history of Depression and Erythromelalgia (H).       Surgical History   has no past surgical history on file.     Allergies   No Known Allergies     Current Home Medications   Prior to Admission medications    Medication Sig Start Date End Date Taking? Authorizing Provider   hydrOXYzine (ATARAX) 25 MG tablet Take 25 mg by mouth every 6 hours as needed for anxiety   Yes Unknown, Entered By History   sertraline (ZOLOFT) 25 MG tablet Take 25 mg by mouth  daily   Yes Reported, Patient   zolpidem (AMBIEN) 10 MG tablet Take 10 mg by mouth At Bedtime   Yes Reported, Patient          Social History  Reviewed, and she  reports that she has never smoked. She has never used smokeless tobacco.      Family History- Reviewed care everywhere to find family history  Nothing relevant to pain consult    Reviewed, and family history is not on file.    Review of Systems  Complete ROS reviewed, unless noted  , all other systems reviewed (with patient) and all others found to be negative.         Objective:     Vitals:  B/P: 139/71, T: 98, P: 83, R: 18     Weight:   159 lbs 13.34 oz  Body mass index is 25.03 kg/m .      Physical Exam:     General Appearance:  Alert, cooperative, no distress, appears stated age  Patient is  pleasant    Head:  Normocephalic, without obvious abnormality, atraumatic   Eyes:  Pupils are EOMI's midposition normal size    ENT/Throat: Lips and mouth are moist   Lymph/Neck: Supple, symmetrical, trachea midline, no adenopathy, thyroid: not enlarged, symmetric    Lungs:   Clear to auscultation bilaterally, respirations unlabored   Chest Wall:  No tenderness or deformity   Cardiovascular/Heart:  Regular rate and rhythm, S1, S2    Abdomen:   Soft, non-tender, bowel sounds active all four quadrants,  no masses, no organomegaly   Musculoskeletal: Extremities normal, atraumatic  Incision none    Skin: Skin color red and edematous feet, lesions  none    Neurologic: Alert and oriented X 3, Moves all 4 extremities           Imaging Reviewed Personally By Myself     Results for orders placed or performed during the hospital encounter of 09/23/23   Chest XR,  PA & LAT    Impression    IMPRESSION:     The cardiac silhouette is normal in size. Hilar and mediastinal interfaces are normal.    The lungs are symmetrically inflated. There are no airspace or interstitial opacities.    Diaphragm curvature is normal. No pleural fluid is present.    Small thoracic spine degenerative  osteophytes are present.      Head CT w/o contrast    Impression    IMPRESSION:  1.  No finding for intracranial hemorrhage, mass, or acute infarct.       XR Pelvis 1/2 Views    Impression    IMPRESSION: Normal joint spaces and alignment. No fracture.   XR Shoulder Right 2 Views    Impression    IMPRESSION: Hypertrophic arthritic change of the acromioclavicular joint. Question a subtle irregularity at the base of the coronoid process of the scapula on the external rotated view, indeterminate for nondisplaced fracture. Consider CT for definitive   characterization. No other evidence for acute fracture. The glenohumeral joint is intact.   XR Lumbar Spine 2/3 Views    Impression    IMPRESSION: Partial sacralization of the L5 vertebral body. Right apex curvature measures approximately 20 degrees. There is approximately 5 mm right lateral listhesis of L3 on L4. Mild grade 1 anterolisthesis of L3 on L4. Vertebral body heights are   grossly preserved. Multilevel degenerative disc height loss and facet arthropathy. The bones appear diffusely demineralized which may reflect osteoporosis. The sacroiliac joints and soft tissues are unremarkable.   MR Brain w/o & w Contrast    Impression    IMPRESSION:  1.  No acute ischemia, mass/mass effect, or abnormal intracranial enhancement.   2.  Paranasal sinus mucosal thickening with suspected fluid level in the right maxillary sinus, which can be seen with acute sinusitis in the appropriate clinical context.       US Lower Extremity Venous Duplex Bilateral    Impression    IMPRESSION: Negative for DVT throughout both lower extremities.    DEREK RIVERA MD         SYSTEM ID:  [id number]   US Upper Extremity Venous Duplex Right    Impression    IMPRESSION: Negative for DVT in the right upper extremity.    DEREK RIVERA MD         SYSTEM ID:  [id number]        Labs Reviewed Personally By Myself    Sodium   Date Value Ref Range Status   09/27/2023 142 135 - 145 mmol/L Final     Comment:      Reference intervals for this test were updated on 09/26/2023 to more accurately reflect our healthy population. There may be differences in the flagging of prior results with similar values performed with this method. Interpretation of those prior results can be made in the context of the updated reference intervals.    03/10/2019 140 133 - 144 mmol/L Final     Potassium   Date Value Ref Range Status   09/27/2023 3.4 3.4 - 5.3 mmol/L Final   03/10/2019 4.4 3.4 - 5.3 mmol/L Final     Chloride   Date Value Ref Range Status   09/27/2023 109 (H) 98 - 107 mmol/L Final   03/10/2019 106 94 - 109 mmol/L Final     Carbon Dioxide   Date Value Ref Range Status   03/10/2019 26 20 - 32 mmol/L Final     Carbon Dioxide (CO2)   Date Value Ref Range Status   09/27/2023 21 (L) 22 - 29 mmol/L Final     Anion Gap   Date Value Ref Range Status   09/27/2023 12 7 - 15 mmol/L Final   03/10/2019 8 3 - 14 mmol/L Final     Glucose   Date Value Ref Range Status   09/27/2023 89 70 - 99 mg/dL Final   03/10/2019 78 70 - 99 mg/dL Final     GLUCOSE BY METER POCT   Date Value Ref Range Status   09/24/2023 97 70 - 99 mg/dL Final     Urea Nitrogen   Date Value Ref Range Status   09/27/2023 3.0 (L) 8.0 - 23.0 mg/dL Final   03/10/2019 10 7 - 30 mg/dL Final     Creatinine   Date Value Ref Range Status   09/27/2023 0.46 (L) 0.51 - 0.95 mg/dL Final   03/10/2019 0.72 0.52 - 1.04 mg/dL Final     GFR Estimate   Date Value Ref Range Status   09/27/2023 >90 >60 mL/min/1.73m2 Final   03/10/2019 90 >60 mL/min/[1.73_m2] Final     Comment:     Non  GFR Calc  Starting 12/18/2018, serum creatinine based estimated GFR (eGFR) will be   calculated using the Chronic Kidney Disease Epidemiology Collaboration   (CKD-EPI) equation.       Calcium   Date Value Ref Range Status   09/27/2023 8.4 (L) 8.8 - 10.2 mg/dL Final   03/10/2019 9.2 8.5 - 10.1 mg/dL Final            Please see A&P for additional details of medical decision making.  MANAGEMENT DISCUSSED  with the following over the past 24 hours: Eh Pack, RN    NOTE(S)/MEDICAL RECORDS REVIEWED over the past 24 hours: yes   - See lab/imaging results included in the data section of the note  - CK, LFTs   Medical complexity over the past 24 hours:  - Decision regarding ESCALATION OF LEVEL OF CARE  - Prescription DRUG MANAGEMENT performed  70  MINUTES SPENT BY ME on the date of service doing chart review, history, exam, documentation & further activities per the note.    Thank you for this consultation.      Natali Ball RN, PGMT-BC APRN,CNP,ACHPN   Acute Pain Team ( SD/RH) 8-4:30 after 3:30 page house officer   No weekend coverage   [facility] Paging/Directory Pain  Securely message with the Vocera Web Console (learn more here)

## 2023-09-27 NOTE — PLAN OF CARE
Goal Outcome Evaluation:       Pt. A & O x4. Denies suicidal thoughts. Sitter through out the shift. Vitals stable on RA. Baseline chronic pain. Clear lung sounds. Tele: SR. Continent of bowel and bladder. Active BS,had BM. Adequately voiding to the bathroom. R PIV infusing NS at rate of  250 mL/hr. Bruises and scraps to R shoulder, elbow, and shin. Abrasion to midline forehead. Wounds on R hip and L knee. SBA. On K protocol, K (3.3), replacement needed. Abnormal labs/trends: elevated CK, & LFT`s.  Plan: Continue suicidal precautions 1:1.

## 2023-09-28 NOTE — PROGRESS NOTES
[facility]    Medicine Progress Note - Hospitalist Service    Date of Admission:  9/23/2023    Assessment & Plan   Intentional prescription drug overdose  Opioid overdose   Suicide attempt    Chronic pain secondary to ERTHROMELAlgia of both feet   Pt presents to the ED on 9/23 with intentional overdose on multiple prescription drugs (varying reports including Ambien, morphine, methadone, oxycontin). She is only prescribed ambien PTA. The patient's son found her on the ground, face down covered in vomit. He found a suicide note. She reported to the ED provider that she suffers from chronic pain and wanted to end her life because she cannot deal with the pain anymore. EMS administered narcan and the patient became more alert.   * U tox positive for benzos, opiates, and cannabinoids   * 9/25 Mental status now at baseline.  * 9/26 pt starting to endorse some sweats, no fever. Perhaps starting to experience some opioid withdrawal   - Admitted to inpatient  - Suicide precautions, bedside attendant   - Psychiatry consultation appreciated, will need inpatient Psychiatry placement when medically stable   -pain medicine consult placed on 9/27 appreciate etheir help   Pts Wellbutrin SR   at 100mg Daily and Zoloft 25mg daily on hold due to elevted LFTS with Rhabdomyolysis   Ambien restarted at 10mg nightly PRN as its her chronic med   Pain medicine started her on Gbapentin 300mg PO TID , Robaxin 500mg TID ,  Hydroxyzine PRN and Oxycodone 5mg q 6hours pRN   Pain well controlled currently     Rhabdomyolysis   Transaminitis 2/2 above   R upper extremity, R lower extremity weakness likely 2/2 above vs peripheral nerve palsy from prolonged down time.   CK is elevated to >22,00 since 9/24 on admission. Suspect severe rhabdo from prolonged down-time. Orthopedics consulted. No clinical indication of compartment syndrome. Transaminases are also markedly elevated likely 2/2 rhabdo.   * Pt had been endorsing RUE  and RLE weakness. MRI obtained and ruled out stroke, suspect weakness is 2/2 rhabdo  * 9/26 CK is now starting to improve [id number] .  LFTs are improving.   * Renal function is stable.   - Intake/output   - Daily CK and LFTs   - PT/OT   Stop IVF today and monitor CPK with time it should continue to improve at this point   Pt is eating,  drinking well currently     Elevated blood pressure without underlying diagnosis of HTN  - As needed hydralazine 5 mg IV for systolic blood pressure over 170 every 6 hours as needed.  BP currently in the 120s      Oropharynx laceration inthe back of her throat  - Unknown etiology  - Continue to follow-up.     Depression/anxiety/insomnia  - Psychiatry consulted as noted above  - Sertraline and wellbutrin resumed     History of HLD  Vitamin D deficiency  Osteoporosis  -No treatment found.  -Patient can follow-up with her primary doctor, after discharge.     Diet: Combination Diet Regular Diet Adult; Safe Tray - with utensils    DVT Prophylaxis: Pneumatic Compression Devices  Bragg Catheter: Not present  Lines: None     Cardiac Monitoring: ACTIVE order. Indication: Electrolyte Imbalance (24 hours)- Magnesium <1.3 mg/ml; Potassium < =2.8 or > 5.5 mg/ml  Code Status: Full Code      Clinically Significant Risk Factors        # Hypokalemia: Lowest K = 3.3 mmol/L in last 2 days, will replace as needed       # Hypoalbuminemia: Lowest albumin = 3.1 g/dL at 9/28/2023  8:33 AM, will monitor as appropriate                       Disposition Plan      Expected Discharge Date: 09/30/2023,  3:00 PM                Karen Saucedo MD  Hospitalist Service  [facility]  Securely message with [place] (more info)  Text page via [facility] Paging/Directory   ______________________________________________________________________    Interval History   Pt is doing well. Eating, drinking well, CPK much improved. Pain well controlled. Denies N/V. Psychiatry is following. No other acute issues      Physical Exam   Vital Signs: Temp: 99.3  F (37.4  C) Temp src: Oral BP: 121/62 Pulse: 85   Resp: 18 SpO2: 100 % O2 Device: None (Room air)    Weight: 156 lbs 4.8 oz    Constitutional: Awake, alert, cooperative, no apparent distress.  Eyes: Conjunctiva and pupils examined and normal.  HEENT: Moist mucous membranes, normal dentition.  Respiratory: Non-labored breathing   Cardiovascular: Regular rate and rhythm  Skin: No rashes, no cyanosis, RLE 1+ edema   Musculoskeletal: No joint swelling, erythema or tenderness. Soft compartments   Neurologic: Cranial nerves 2-12 intact, normal strength and sensation.  Psychiatric: Alert, oriented to person, place and time, no obvious anxiety or depression.      Medical Decision Making       53 MINUTES SPENT BY ME on the date of service doing chart review, history, exam, documentation & further activities per the note.      Data     I have personally reviewed the following data over the past 24 hrs:    N/A  \   N/A   / N/A     142 111 (H) 5.5 (L) /  105 (H)   3.4 21 (L) 0.51 \     ALT: 152 (H) AST: 300 (H) AP: 51 TBILI: 0.4   ALB: 3.1 (L) TOT PROTEIN: 5.6 (L) LIPASE: N/A       Imaging results reviewed over the past 24 hrs:   No results found for this or any previous visit (from the past 24 hour(s)).

## 2023-09-28 NOTE — PLAN OF CARE
DATE & TIME: 9/27-09/28/23 Night shift  Cognitive Concerns/ Orientation : A&Ox4; calm, cooperative, pleasant; denies suicidal ideations   BEHAVIOR & AGGRESSION TOOL COLOR: Green  CIWA SCORE: NA   ABNL VS/O2: VSS on RA  MOBILITY: SBA  PAIN MANAGMENT: Denied  DIET: Regular  BOWEL/BLADDER: Continent; last BM yesterday  ABNL LAB/BG: [place] [address]; ALT//598; new labs this AM  DRAIN/DEVICES: RFA PIV infusing NS @ 125mL/hr  TELEMETRY RHYTHM: NSR  SKIN: Dry, flaky, scattered bruising, redness BLE- aubrey feet, chronic condition per patient  TESTS/PROCEDURES: None  D/C DATE: Pending  OTHER IMPORTANT INFO: Pain Team, Psych and GI following. PT/OT following. Will go to inpatient psych when medically ready.

## 2023-09-28 NOTE — PLAN OF CARE
Orientation: A&Ox4; 1:1 bedside sitter for SI precaution. Denies SI, contracts for safety.     Vitals/Tele: VSS RA    IV Access/drains: PIV     Diet: Regular     Mobility: IND    GI/: Continent B&B    Wound/Skin: RUE shoulder bruised. BLE edema +3, red. RUE/RLE weakness, RUE hand  +1.     Pain: BLE feet. Managed with prn oxycodone and scheduled robaxin.     Consults: Psych following.     Discharge Plan: In-patient psych waitlist. Discharge pending placement.       See Flow sheets for assessment

## 2023-09-28 NOTE — PLAN OF CARE
DATE & TIME: 9/28/23 [id number]    Cognitive Concerns/ Orientation : A&Ox4; calm, cooperative, pleasant; denies suicidal ideations   BEHAVIOR & AGGRESSION TOOL COLOR: Green  CIWA SCORE: NA   ABNL VS/O2: VSS on RA  MOBILITY: SBA - ambulating in room/halls well  PAIN MANAGMENT: Patient c/o BLE pain relieved with Oxycodone; also c/o sore throat relieved with chloraseptic lozenges  DIET: Regular - tolerating diet well  BOWEL/BLADDER: Continent; last BM today  ABNL LAB/BG: K+ 3.5; CK 6689; BUN 5.5; ALT//300; Ca++ 8.5; Alb 3.1; Protein 5.6  DRAIN/DEVICES: RFA PIV SL  TELEMETRY RHYTHM: NSR  SKIN: Dry, flaky, scattered bruising, redness BLE- aubrey feet, chronic condition per patient  TESTS/PROCEDURES: None  D/C DATE: Pending - Will go to inpatient psych when medically ready.   OTHER IMPORTANT INFO: Pain Team, Psych and GI following. PT/OT following. 1:1 sitter discontinued at 1200 today.

## 2023-09-28 NOTE — PROGRESS NOTES
Initial Psychiatric Consult   Consult date: September 25, 2023         Reason for Consult, requesting source:      intentional drug overdose     Requesting source: Wen Parker    Labs and imaging reviewed. Patient seen and evaluated by Karley Piña MD          HPI:     This is a 63-year-old female admitted on 9/23/2023 in the setting of overdose on number 9 tablets of morphine ER 60 mg, number 5 tablets of OxyContin 30 mg and an unknown number of Ambien.  Patient's son found her down, as she had been staying with him.  She was covered in her own vomit, which is described as coffee-ground like emesis.  EMS were activated, and patient apparently revived once Narcan was given at the scene.  Patient has chronic pain.   She apparently had a CK greater than 22,000.  CT head was negative.  Patient had a transaminitis, with AST climbing to almost 1100.  Acetaminophen level was negative.    Patient seen today after reviewing the chart.  We had a lengthy conversation.  She is very open about the fact that she overdosed on her opiates.  She actually has not been prescribed opiates for many years, but had so many opiates stockpiled, that she has been taking them consistently for years now.  Most recently, she has been taking morphine ER a total of 30 mg/day.  She had not been taking OxyContin, she only did this when she OD'd.  It was difficult for me to tell whether she has been taking methadone consistently, or just when she OD'd.  She states that she took 60 mg total of methadone when she OD'd.    Patient agrees that she needs inpatient psychiatric hospitalization.  She did asked to have her Ambien back, because she has been on this for years    9/27/2023: Patient seen today for follow-up.  Discussed with Dr. Saucedo.  Patient continues with transaminitis, so psychiatric medications were held last night.  She reports she did not sleep at all, and feels like she is in opiate withdrawal.  Patient has oxycodone  written with a total of 20 mg available per 24 hours, equivalent to the morphine ER 30 mg daily she stated she was taking in the community.    I discussed holding on the patient's antidepressants with her.  She states that she feels pretty depressed, and does not feel equipped to dealing with her life at this point.  She continues to feel she needs an inpatient psychiatric hospitalization.  I told her we would restart her Ambien tonight and discussed with Dr. Saucedo.    9/28/2023: Patient reports feeling better today after getting a good night sleep last night, and getting a shower this morning.  She still depressed.  I have restarted low-dose Zoloft and Wellbutrin as we have discussed previously, since her liver transaminases are trending down.  She is denying suicidality, so agree with discontinuing sitter.        Past Psychiatric History:   From prior psychiatric note found from 2008, the patient has a history of cutting her wrist at age 17 and attempted suicide.  This note stated that she had been in therapy on and off.  She had a diagnosis of attention deficit disorder in the past and had been taking Ritalin at that time.  She is also had trials of Zoloft and Prozac.    Patient states that she had been on Zoloft and Wellbutrin until 2 months ago, when she stopped taking them abruptly.  She states that she feels like she spiraled in terms of her mental health because of that.        Substance Use and History:      I see no prescriptions for opiate analgesia, or any controlled substances aside from Ambien this year on PDMP.        Past Medical History:   PAST MEDICAL HISTORY:   Past Medical History:   Diagnosis Date    Depression     Erythromelalgia (H)        PAST SURGICAL HISTORY: No past surgical history on file.          Family History:   FAMILY HISTORY: No family history on file.    Family Psychiatric History:         Social History:   SOCIAL HISTORY:   Social History     Tobacco Use    Smoking status: Never     Smokeless tobacco: Never   Substance Use Topics    Alcohol use: Not on file       Patient recently broke up with her partner of 25 years and went back with her ex- who she  30 years ago.  Then it sounds like she broke up with the ex-, and was considering renting a room from the ex-boyfriend.  Family is not in support of this.         Physical ROS:   The 10 point Review of Systems is negative other than noted in the HPI or here.           Medications:      buPROPion  100 mg Oral Daily with breakfast    gabapentin  300 mg Oral TID    hydrOXYzine  25 mg Oral Q6H    Or    hydrOXYzine  50 mg Oral Q6H    methocarbamol  500 mg Oral TID    polyethylene glycol  17 g Oral Daily    pramox-pe-glycerin-petrolatum   Rectal BID    sertraline  25 mg Oral Daily with breakfast    sodium chloride (PF)  3 mL Intracatheter Q8H              Allergies:   No Known Allergies       Labs:     Recent Results (from the past 48 hour(s))   Potassium    Collection Time: 09/26/23  5:00 PM   Result Value Ref Range    Potassium 3.7 3.4 - 5.3 mmol/L   Comprehensive metabolic panel    Collection Time: 09/27/23  5:42 AM   Result Value Ref Range    Sodium 142 135 - 145 mmol/L    Potassium 3.3 (L) 3.4 - 5.3 mmol/L    Carbon Dioxide (CO2) 21 (L) 22 - 29 mmol/L    Anion Gap 12 7 - 15 mmol/L    Urea Nitrogen 3.0 (L) 8.0 - 23.0 mg/dL    Creatinine 0.46 (L) 0.51 - 0.95 mg/dL    GFR Estimate >90 >60 mL/min/1.73m2    Calcium 8.4 (L) 8.8 - 10.2 mg/dL    Chloride 109 (H) 98 - 107 mmol/L    Glucose 89 70 - 99 mg/dL    Alkaline Phosphatase 52 35 - 104 U/L     (HH) 0 - 45 U/L     (H) 0 - 50 U/L    Protein Total 6.0 (L) 6.4 - 8.3 g/dL    Albumin 3.4 (L) 3.5 - 5.2 g/dL    Bilirubin Total 0.5 <=1.2 mg/dL   CK total    Collection Time: 09/27/23  5:42 AM   Result Value Ref Range    CK 14,400 (HH) 26 - 192 U/L   INR    Collection Time: 09/27/23  5:42 AM   Result Value Ref Range    INR 1.00 0.85 - 1.15   Potassium    Collection Time:  "09/27/23 11:53 AM   Result Value Ref Range    Potassium 3.4 3.4 - 5.3 mmol/L   Urine Creatinine for Drug Screen Panel    Collection Time: 09/27/23  1:02 PM   Result Value Ref Range    Creatinine Urine for Drug Screen 14 mg/dL   Cannabinoids qualitative urine    Collection Time: 09/27/23  1:02 PM   Result Value Ref Range    Cannabinoids Urine Screen Negative Screen Negative   Potassium    Collection Time: 09/27/23 10:47 PM   Result Value Ref Range    Potassium 3.5 3.4 - 5.3 mmol/L   Comprehensive metabolic panel    Collection Time: 09/28/23  8:33 AM   Result Value Ref Range    Sodium 142 135 - 145 mmol/L    Potassium 3.4 3.4 - 5.3 mmol/L    Carbon Dioxide (CO2) 21 (L) 22 - 29 mmol/L    Anion Gap 10 7 - 15 mmol/L    Urea Nitrogen 5.5 (L) 8.0 - 23.0 mg/dL    Creatinine 0.51 0.51 - 0.95 mg/dL    GFR Estimate >90 >60 mL/min/1.73m2    Calcium 8.5 (L) 8.8 - 10.2 mg/dL    Chloride 111 (H) 98 - 107 mmol/L    Glucose 105 (H) 70 - 99 mg/dL    Alkaline Phosphatase 51 35 - 104 U/L     (H) 0 - 45 U/L     (H) 0 - 50 U/L    Protein Total 5.6 (L) 6.4 - 8.3 g/dL    Albumin 3.1 (L) 3.5 - 5.2 g/dL    Bilirubin Total 0.4 <=1.2 mg/dL   CK total    Collection Time: 09/28/23  8:33 AM   Result Value Ref Range    CK 6,146 (HH) 26 - 192 U/L   CK total    Collection Time: 09/28/23 11:22 AM   Result Value Ref Range    CK 6,689 (HH) 26 - 192 U/L          Physical and Psychiatric Examination:     /62 (BP Location: Left arm)   Pulse 85   Temp 99.3  F (37.4  C) (Oral)   Resp 18   Ht 1.702 m (5' 7\")   Wt 70.9 kg (156 lb 4.8 oz)   SpO2 100%   BMI 24.48 kg/m    Weight is 156 lbs 4.8 oz  Body mass index is 24.48 kg/m .    Physical Exam:  I have reviewed the physical exam as documented by by the medical team and agree with findings and assessment and have no additional findings to add at this time.    Mental Status Exam:    Appearance: awake, alert, appeared as age stated, alert, and cooperative  Attitude:  cooperative  Eye " Contact:  good  Mood:  depressed  Affect:  mood congruent  Speech:  clear, coherent  Language: Fluent in english   Psychomotor Behavior:  no evidence of tardive dyskinesia, dystonia, or tics  Thought Process:  logical  Associations:  no loose associations  Thought Content:  no evidence of suicidal ideation or homicidal ideation, no evidence of psychotic thought, no auditory hallucinations present, and no visual hallucinations present  Insight:  fair  Judgement:  fair  Oriented to:  time, person, and place  Attention Span and Concentration:  intact  Recent and Remote Memory:  intact  Fund of Knowledge: Appropriate   Gait and Station:                DSM-5 Diagnosis:   Overdose on opiate analgesia, intentional, suicide attempt, subsequent encounter    Mood disorder due to a general medical condition    Chronic pain    Erythromelalgia    Rhabdomyolysis    Major depressive disorder, recurrent, severe              Assessment:     This is a 63-year-old female who reports depression as long as she can remember.  This undoubtedly is made worse by her chronic erythromelalgia, and the pain that goes along with this.  It also has a huge impact on her functioning and quality of life.  She abruptly stopped taking her antidepressants 2 months ago, and spiraled.  She also states that she has been taking opiate analgesia that she had been stockpiling for many years.  She had saved enough medication, that it sounds like she has been using stockpiled medications for her pain for several years.  I got confused about the timeline.  Basically, she has been taking morphine ER a total of 30 mg p.o. daily.  She does not feel like she is in opiate withdrawal now, but also took as much as 60 mg of methadone when she overdosed.    Patient is agreeable to going to an inpatient psychiatric hospitalization.  Would recommend that we hold off on Zoloft and Wellbutrin, in favor of Cymbalta which actually may give her some benefit for her pain.   Patient continues to have significant rhabdomyolysis.  Would recommend that we hold off on the trial of Cymbalta until the rhabdomyolysis has substantially cleared.    9/26/2023: Patient states she is beginning to feel better.  She would like to discuss going back on opiate analgesia.  I indicated to her that a pain consult may be helpful in helping her ascertain what is most appropriate for her.    She is still feeling depressed.  I did talk to her about going on Cymbalta, which she stated was not helpful for her in the past.  We talked about the different options in terms of restarting medications.  Of the options, she prefers to restart both Zoloft and Wellbutrin together, because this was most helpful for her.  We discussed an initial starting dose, and I will follow along.  Have also restarted patient's Ambien.    Patient is feeling better after a good night sleep with Ambien last night.  Pain is also better controlled.  She still depressed, though denies suicidality.  She contracts for safety.  Will reinitiate low-dose Zoloft and Wellbutrin as we had discussed previously.            Summary of Recommendations:   1.  We will discontinue hold.  Patient is willing to go to inpatient psychiatry voluntarily.  If she changes her mind, place her back on a hold please, and we will petition for commitment.    2.  Agree with discontinuing sitter.  Patient is denying current suicidal ideation and contracted for safety with me.    3.  Have restarted Ambien 10 mg p.o. nightly as needed for insomnia.  This reflects the patient's home dose    4.  Have restarted Wellbutrin  mg p.o. every morning and Zoloft 25 mg p.o. every morning per previous discussion with patient now that transaminases have trended down.    5.  Patient will require an inpatient psychiatric bed.  We will continue to treat while she is here on this unit, awaiting transfer to psychiatry.  Please let behavioral health [facility] know when the patient has been  medically cleared.    Karley Piña MD  Consult/Liaison Psychiatry and Addiction Medicine  [facility]

## 2023-09-29 NOTE — PLAN OF CARE
Goal Outcome Evaluation:      Plan of Care Reviewed With: patient    Overall Patient Progress: improvingOverall Patient Progress: improving     DATE & TIME: 9/28-29/23 [id number]  Cognitive Concerns/ Orientation : A&Ox4; calm, cooperative, pleasant; denies suicidal ideations   BEHAVIOR & AGGRESSION TOOL COLOR: Green  CIWA SCORE: NA   ABNL VS/O2: VSS on RA  MOBILITY: SBA - ambulating in room.   PAIN MANAGMENT: Severe pain on BLE Oxycodone given once; also  c/o sore throat relieved with chloraseptic lozenges  DIET: Regular - tolerating diet well  BOWEL/BLADDER: Continent of B&B  ABNL LAB/BG: K+ 3.5; CK 6689; BUN 5.5; ALT//300; Ca++ 8.5; Alb 3.1; Protein 5.6  DRAIN/DEVICES: RFA PIV SL  TELEMETRY RHYTHM: NSR  SKIN: Dry, flaky, scattered bruising, redness on feet; chronic condition per patient  TESTS/PROCEDURES: None  D/C DATE: Pending - Will go to inpatient psych when medically ready.   OTHER IMPORTANT INFO: Pain Team, Psych and GI following. PT/OT following. Pt is no longer on suicide precautions. Ice packs have been effective relieving the burning sensation on feet. PT has topical lidocaine cream available as well. Ambien given at 2200 per patient request.

## 2023-09-29 NOTE — PROGRESS NOTES
Dynamic Gait Index (DGI):The DGI is a measure of balance during gait that is reliable and valid for the elderly and individuals with Parkinson's disease, MS, vestibular disorders, or s/p stroke. Gait assistive device used: none     Patient score: 21/24  Scores ?19/24 indicate an increased risk for falls according to Rebecca et al 2000  Minimal Detectable Change = 2.9 in community dwelling elderly according to Rafael et al 2011    Assessment (rationale for performing, application to patient s function & care plan): Pt at decreased risk for falls.   Minutes billed as physical performance test: 9      09/29/23 1700   Signing Clinician's Name / Credentials   Signing clinician's name / credentials PARVIZ Rodriguez   Dynamic Gait Index (Levon and Wong [place], 1995)   Gait Level Surface 3   Change in Gait Speed 3   Gait and Horizontal Head Turns 3   Gait with Vertical Head Turns 3   Gait and Pivot Turns 3   Step Over Obstacle 2   Step Around Obstacles 2   Steps 2   Total Dynamic Gait Index Score  (A score of 19 or less has been correlated to an increased risk of falls in community dwelling older adults, patients with vestibular disorders, and patients with MS.)   Total Score (out of 24) 21

## 2023-09-29 NOTE — PLAN OF CARE
Physical Therapy Discharge Summary    Reason for therapy discharge:    All goals and outcomes met, no further needs identified.    Progress towards therapy goal(s). See goals on Care Plan in [facility] electronic health record for goal details.  Goals met    Therapy recommendation(s):    No further therapy is recommended.Continue HEP and amb in hallways IND.

## 2023-09-29 NOTE — PROGRESS NOTES
"[facility] ACUTE PAIN SERVICE    [facility]   Daily PAIN Progress Note        AM[facility] Paging/Directory Pain  Securely message with the [employer] Web Console (learn more here)  (When I saw the patient): 09/29/23  Assessment/Plan:  Gabriela Gray is a 63 year old female who was admitted on 9/23/2023.   I was asked to see the patient for  pain management  with pain type  musculoskeletal./neuropathic related to rhabdomyolysis, chronic pain  in feet 2/2 Erythromelalgia in setting of suicide attempt with use of opioids she had stocked piled over the years . History long history of pain in feet following in pain clinic 15 years ago. She had \"stocked piled methadone and Morphine CR 60 mg has been on and off these medications for pain over the years. Over last 3 months she has been taking through the stock pile,  Methadone 10 mg ad and cutting CR Morphine and taking about 30 mg/day.   Burning pain continues in feet and acutely from Rhabdomyolysis in calves which is cramping with some jerking in legs. . Describes pain as with numeric rating of 8-10/10. The patient denies shortness of breath, mental status changes, chest pain. The patient does not smoke and has a chemical dependency history. Opioid tolerance is estimated  moderate to high.  In this setting it is reasonable to treat the rhabdomyolysis pain with prn Oxycodone over 4 days. The continuation of methadone for pain in this setting is not indicated. Advise ongoing mental health support while her with anticipated in patient admission.  If psychiatry indicates that methadone is the best treatment for her for AMADOR it can be managed with this service. Buprenorphine is another good option for OUD which is her primary substance misuse.  Will also order confirmatory metabolic urine screen     Opioid Induced Respiratory Depression Risk Assessment:  (Low 0-1; Moderate 2-4; or High >4 or >/= 3 if two of the risk factors are age > 60 and opioid naive) due to the " "following risk factors: BRUNA, COPD/Asthma/pulmonary disease, CHF, renal dysfunction, hepatic dysfunction, Obesity, Smoker, Age>60, >2 opioid therapies, concomitant CNS depressants, opioid naive status, or post surgical ?   Chronic opioid use = 60 mg MME, opioid used this past 24 hours 0.   PLAN:      1)  Pain is consistent with musculoskeletal./neuropathic, secondary to rhabdomyolysis and chronic pain  in feet 2/2 Erythromelalgia. Treatment plan includes multimodal pain approach, medications as listed below, reviewed.  Discharge medications, advised keeping track of doses, educated on tapering off as pain improves, watch for constipation and stool softeners, no driving or alcohol with opioids, store medications in a safe place, advised to take no more than the prescribed dose as increased doses may cause respiratory depression or death. Patient is understanding of the plan. All questions and concerns addressed to patient's satisfaction.   2)Multimodal Medication Therapy  Topical:    and Lidocaine cream 4% Apply to  legs and feet.   Adjuvants: CrCl is 106.4  mg/dl and Tylenol prn \", Hydroxyzine 25-50 mg every 6 hours\" Gabapentin 300 mg tid, Muscle relaxer's methocarbamol 500 mg tid . \"  Antidepressants/anxiolytics: none see mental health note   Opioids:Oxycodone 5 mg every 6 hrs prn x 3 day then discontinue   IV Pain medication:  none   3)Non-medication interventions- Ice, Heat, physical therapy, distraction aroma therapy, menthol cream   4)Constipation Prophylaxis- senna and miralax  . Continue to monitor.   5) Follow up   Urine metabolic screen pending  -acute pain team will sign off.   -Opioid prescriber has been none . PCP is No Ref-Primary, Physician.     -Discharge Recommendations - We recommend prescribing the following at the time of discharge:   No opioids at discharge   Topical menthol cream and lidocaine gel  Gabapentin 300 mg tid            Subjective:  Up in halls legs still hurt  Sitter discontinue and " "hold see by psyche            Hyperkalemia   Patient Active Problem List   Diagnosis    Hyperkalemia    Lactic acidosis    Suicide attempt (H)    Chronic pain disorder    Overdose, intentional self-harm, initial encounter (H)        History   Drug Use Not on file         Tobacco Use      Smoking status: Never      Smokeless tobacco: Never         buPROPion  100 mg Oral Daily with breakfast    gabapentin  300 mg Oral TID    hydrOXYzine  25 mg Oral Q6H    Or    hydrOXYzine  50 mg Oral Q6H    methocarbamol  500 mg Oral TID    polyethylene glycol  17 g Oral Daily    pramox-pe-glycerin-petrolatum   Rectal BID    sertraline  25 mg Oral Daily with breakfast    sodium chloride (PF)  3 mL Intracatheter Q8H       Objective:  Vital signs in last 24 hours:  B/P: 114/73, T: 98.6, P: 85, R: 16   Blood pressure 114/73, pulse 85, temperature 98.6  F (37  C), temperature source Oral, resp. rate 16, height 1.702 m (5' 7\"), weight 70.9 kg (156 lb 4.8 oz), SpO2 100 %, not currently breastfeeding.      Weight:   Wt Readings from Last 2 Encounters:   09/28/23 70.9 kg (156 lb 4.8 oz)   01/14/20 68 kg (150 lb)   Last Comprehensive Metabolic Panel:  Sodium   Date Value Ref Range Status   09/29/2023 143 135 - 145 mmol/L Final     Comment:     Reference intervals for this test were updated on 09/26/2023 to more accurately reflect our healthy population. There may be differences in the flagging of prior results with similar values performed with this method. Interpretation of those prior results can be made in the context of the updated reference intervals.    03/10/2019 140 133 - 144 mmol/L Final     Potassium   Date Value Ref Range Status   09/29/2023 3.5 3.4 - 5.3 mmol/L Final   03/10/2019 4.4 3.4 - 5.3 mmol/L Final     Chloride   Date Value Ref Range Status   09/29/2023 109 (H) 98 - 107 mmol/L Final   03/10/2019 106 94 - 109 mmol/L Final     Carbon Dioxide   Date Value Ref Range Status   03/10/2019 26 20 - 32 mmol/L Final     Carbon " Dioxide (CO2)   Date Value Ref Range Status   09/29/2023 24 22 - 29 mmol/L Final     Anion Gap   Date Value Ref Range Status   09/29/2023 10 7 - 15 mmol/L Final   03/10/2019 8 3 - 14 mmol/L Final     Glucose   Date Value Ref Range Status   09/29/2023 88 70 - 99 mg/dL Final   03/10/2019 78 70 - 99 mg/dL Final     GLUCOSE BY METER POCT   Date Value Ref Range Status   09/24/2023 97 70 - 99 mg/dL Final     Urea Nitrogen   Date Value Ref Range Status   09/29/2023 6.9 (L) 8.0 - 23.0 mg/dL Final   03/10/2019 10 7 - 30 mg/dL Final     Creatinine   Date Value Ref Range Status   09/29/2023 0.59 0.51 - 0.95 mg/dL Final   03/10/2019 0.72 0.52 - 1.04 mg/dL Final     GFR Estimate   Date Value Ref Range Status   09/29/2023 >90 >60 mL/min/1.73m2 Final   03/10/2019 90 >60 mL/min/[1.73_m2] Final     Comment:     Non  GFR Calc  Starting 12/18/2018, serum creatinine based estimated GFR (eGFR) will be   calculated using the Chronic Kidney Disease Epidemiology Collaboration   (CKD-EPI) equation.       Calcium   Date Value Ref Range Status   09/29/2023 8.8 8.8 - 10.2 mg/dL Final   03/10/2019 9.2 8.5 - 10.1 mg/dL Final     Bilirubin Total   Date Value Ref Range Status   09/29/2023 0.4 <=1.2 mg/dL Final   03/10/2019 0.2 0.2 - 1.3 mg/dL Final     Alkaline Phosphatase   Date Value Ref Range Status   09/29/2023 57 35 - 104 U/L Final   03/10/2019 68 40 - 150 U/L Final     ALT   Date Value Ref Range Status   09/29/2023 144 (H) 0 - 50 U/L Final     Comment:     Reference intervals for this test were updated on 6/12/2023 to more accurately reflect our healthy population. There may be differences in the flagging of prior results with similar values performed with this method. Interpretation of those prior results can be made in the context of the updated reference intervals.     03/10/2019 15 0 - 50 U/L Final     AST   Date Value Ref Range Status   09/29/2023 209 (H) 0 - 45 U/L Final     Comment:     Reference intervals for this  test were updated on 6/12/2023 to more accurately reflect our healthy population. There may be differences in the flagging of prior results with similar values performed with this method. Interpretation of those prior results can be made in the context of the updated reference intervals.   03/10/2019 17 0 - 45 U/L Final     CK 09/29/23 3,767 UL trending down                  Intake/Output:    Intake/Output Summary (Last 24 hours) at 9/29/2023 1502  Last data filed at 9/29/2023 0800  Gross per 24 hour   Intake 240 ml   Output --   Net 240 ml        Review of Systems:   As per subjective, all others negative.    Physical Exam:     General Appearance:  Alert, cooperative, no distress. Patient is pleasant    Head:  Normocephalic, without obvious abnormality, atraumatic   Eyes:   Conjunctiva/corneas clear, EOM's intact   ENT/Throat: Lips, mouth moist     Lymph/Neck: Symmetrical, trachea midline, no adenopathy, thyroid: not enlarged, symmetric    Lungs:   Respirations unlabored, even chest rise. Symmetrical movement    Chest Wall:  No tenderness or deformity   Cardiovascular/Heart:  Regular rate and rhythm, S1, S2 normal,no murmur, rub or gallop.     Abdomen:   deferred   Musculoskeletal: Extremities normal, atraumatic  Incision none    Skin: Skin color good, lesions  none    Neurologic: Alert and oriented X 3, Moves all 4 extremitie       Psych: Affect is  depressed  Grooming is  good            Imaging:  Personally Reviewed.        Results for orders placed or performed during the hospital encounter of 09/23/23   Chest XR,  PA & LAT    Impression    IMPRESSION:     The cardiac silhouette is normal in size. Hilar and mediastinal interfaces are normal.    The lungs are symmetrically inflated. There are no airspace or interstitial opacities.    Diaphragm curvature is normal. No pleural fluid is present.    Small thoracic spine degenerative osteophytes are present.      Head CT w/o contrast    Impression    IMPRESSION:  1.   No finding for intracranial hemorrhage, mass, or acute infarct.       XR Pelvis 1/2 Views    Impression    IMPRESSION: Normal joint spaces and alignment. No fracture.   XR Shoulder Right 2 Views    Impression    IMPRESSION: Hypertrophic arthritic change of the acromioclavicular joint. Question a subtle irregularity at the base of the coronoid process of the scapula on the external rotated view, indeterminate for nondisplaced fracture. Consider CT for definitive   characterization. No other evidence for acute fracture. The glenohumeral joint is intact.   XR Lumbar Spine 2/3 Views    Impression    IMPRESSION: Partial sacralization of the L5 vertebral body. Right apex curvature measures approximately 20 degrees. There is approximately 5 mm right lateral listhesis of L3 on L4. Mild grade 1 anterolisthesis of L3 on L4. Vertebral body heights are   grossly preserved. Multilevel degenerative disc height loss and facet arthropathy. The bones appear diffusely demineralized which may reflect osteoporosis. The sacroiliac joints and soft tissues are unremarkable.   MR Brain w/o & w Contrast    Impression    IMPRESSION:  1.  No acute ischemia, mass/mass effect, or abnormal intracranial enhancement.   2.  Paranasal sinus mucosal thickening with suspected fluid level in the right maxillary sinus, which can be seen with acute sinusitis in the appropriate clinical context.       US Lower Extremity Venous Duplex Bilateral    Impression    IMPRESSION: Negative for DVT throughout both lower extremities.    DEREK RIVERA MD         SYSTEM ID:  [id number]   US Upper Extremity Venous Duplex Right    Impression    IMPRESSION: Negative for DVT in the right upper extremity.    DEREK RIVERA MD         SYSTEM ID:  [id number]        Lab Results:  Personally Reviewed.   Last Comprehensive Metabolic Panel:  Sodium   Date Value Ref Range Status   09/29/2023 143 135 - 145 mmol/L Final     Comment:     Reference intervals for this test were updated  on 09/26/2023 to more accurately reflect our healthy population. There may be differences in the flagging of prior results with similar values performed with this method. Interpretation of those prior results can be made in the context of the updated reference intervals.    03/10/2019 140 133 - 144 mmol/L Final     Potassium   Date Value Ref Range Status   09/29/2023 3.5 3.4 - 5.3 mmol/L Final   03/10/2019 4.4 3.4 - 5.3 mmol/L Final     Chloride   Date Value Ref Range Status   09/29/2023 109 (H) 98 - 107 mmol/L Final   03/10/2019 106 94 - 109 mmol/L Final     Carbon Dioxide   Date Value Ref Range Status   03/10/2019 26 20 - 32 mmol/L Final     Carbon Dioxide (CO2)   Date Value Ref Range Status   09/29/2023 24 22 - 29 mmol/L Final     Anion Gap   Date Value Ref Range Status   09/29/2023 10 7 - 15 mmol/L Final   03/10/2019 8 3 - 14 mmol/L Final     Glucose   Date Value Ref Range Status   09/29/2023 88 70 - 99 mg/dL Final   03/10/2019 78 70 - 99 mg/dL Final     GLUCOSE BY METER POCT   Date Value Ref Range Status   09/24/2023 97 70 - 99 mg/dL Final     Urea Nitrogen   Date Value Ref Range Status   09/29/2023 6.9 (L) 8.0 - 23.0 mg/dL Final   03/10/2019 10 7 - 30 mg/dL Final     Creatinine   Date Value Ref Range Status   09/29/2023 0.59 0.51 - 0.95 mg/dL Final   03/10/2019 0.72 0.52 - 1.04 mg/dL Final     GFR Estimate   Date Value Ref Range Status   09/29/2023 >90 >60 mL/min/1.73m2 Final   03/10/2019 90 >60 mL/min/[1.73_m2] Final     Comment:     Non  GFR Calc  Starting 12/18/2018, serum creatinine based estimated GFR (eGFR) will be   calculated using the Chronic Kidney Disease Epidemiology Collaboration   (CKD-EPI) equation.       Calcium   Date Value Ref Range Status   09/29/2023 8.8 8.8 - 10.2 mg/dL Final   03/10/2019 9.2 8.5 - 10.1 mg/dL Final        UA:   Amphetamines Urine   Date Value Ref Range Status   09/23/2023 Screen Negative Screen Negative Final     Comment:     Cutoff for a negative  amphetamine is less than 500 ng/mL.     Barbituates Urine   Date Value Ref Range Status   09/23/2023 Screen Negative Screen Negative Final     Comment:     Cutoff for a negative barbiturate is less than 200 ng/mL.     Cannabinoids Urine   Date Value Ref Range Status   09/27/2023 Screen Negative Screen Negative Final     Comment:     Cutoff for a negative cannabinoid is less than 50 ng/mL.     Cocaine Urine   Date Value Ref Range Status   09/23/2023 Screen Negative Screen Negative Final     Comment:     Cutoff for a negative cocaine is less than 300 ng/mL.     Opiates Urine   Date Value Ref Range Status   09/23/2023 Screen Positive (A) Screen Negative Final     Comment:     Cutoff for a positive opiate is 300 ng/mL or greater.  This is an unconfirmed screening result to be used for medical purposes only.     PCP Urine   Date Value Ref Range Status   09/23/2023 Screen Negative Screen Negative Final     Comment:     Cutoff for a negative PCP is less than 25 ng/mL.              Please see A&P for additional details of medical decision making.  MANAGEMENT DISCUSSED with the following over the past 24 hours: Celia Gross RN   NOTE(S)/MEDICAL RECORDS REVIEWED over the past 24 hours: yes   Tests ORDERED & REVIEWED in the past 24 hours:  - CMP  - CBC  - CK  Medical complexity over the past 24 hours:  - Treatment limited by SOCIAL DETERMINANTS OF HEALTH  - no medication changes   Time spent: 10 minutes chart review documentation face to face and communication with health care team       Natali Ball, APRN CNP, PGMT-BC, ACHPN  [facility] Monday-Friday 8:00-4:30   No weekend coverage contact house officer  [facility] Paging/Directory Pain  Securely message with the Vocera Web Console (learn more here)

## 2023-09-29 NOTE — PLAN OF CARE
Goal Outcome Evaluation:                      gnitive Concerns/ Orientation : A&Ox4; calm, cooperative, pleasant; denies suicidal ideations   BEHAVIOR & AGGRESSION TOOL COLOR: Green  CIWA SCORE: NA   ABNL VS/O2: VSS on RA  MOBILITY: Up ad paola  PAIN MANAGMENT: Severe pain on BLE Oxycodone given x1;uses lidocaine ointment to feet; also  c/o sore throat relieved with chloraseptic lozenges  DIET: Regular , didn't want lunch, so ordered ensure supplements  BOWEL/BLADDER: Continent of B&B, pt states had abd cramping and diarrhea past few mornings.  ABNL LAB/BG: K+ 3.5; CK 3767. ALT//209.  DRAIN/DEVICES: RFA PIV SL  TELEMETRY RHYTHM: NSR-dc'd  SKIN: Dry, flaky, scattered bruising, redness on feet; chronic condition per patient  TESTS/PROCEDURES: None  D/C DATE: Pending - Will go to inpatient psych when medically ready.   OTHER IMPORTANT INFO: Pain Team, Psych and GI following. PT/OT following. Pt is no longer on suicide precautions. Xanax given x1 for anxiety.

## 2023-09-29 NOTE — PROGRESS NOTES
"[facility]  WO Nurse Inpatient Wound Assessment     Reason for consultation: Evaluate and treat      Treatment Plan  Deleted, no longer needed     Orders discontinued   Recommended provider order: none  WO Nurse follow-up plan: sign off   Nursing to notify the Provider(s) and re-consult the [facility] Nurse if wound(s) deteriorates or new skin concern.    Patient History  According to provider note(s):    \"63 year old female admitted on 9/23/2023  For intentional overdose reportedly on morphine ER 60 mg 9 tablets, OxyContin 30 mg total of 5 tablets and unknown numbers of Ambien tablets.  PMH includes depression, erythromelalgia, pain disorder, foot pain, primary insomnia, history of osteoporosis, history of vitamin D deficiency, history of hypercholesterolemia and generalized anxiety.\"     Objective Data  Containment of urine/stool: not assessed with consult     Active Diet Order:  Orders Placed This Encounter      Combination Diet Regular Diet Adult; Safe Tray - with utensils    Output:   No intake/output data recorded.    Risk Assessment:   Sensory Perception: 4-->no impairment  Moisture: 4-->rarely moist  Activity: 3-->walks occasionally  Mobility: 3-->slightly limited  Nutrition: 3-->adequate  Friction and Shear: 3-->no apparent problem  Andrew Score: 20                          Labs:   Recent Labs   Lab 09/29/23  0846 09/28/23  0833 09/27/23  0542 09/26/23  1100   ALBUMIN 3.3*   < > 3.4* 3.3*   HGB  --   --   --  10.7*   INR  --   --  1.00  --    WBC  --   --   --  5.6    < > = values in this interval not displayed.         Physical Exam  Skin inspection: review of previously documented areas     All photos taken 9/29     Forehead healing abrasion     Right lateral thigh, erythema and induration resolving     Right arm forearm and AC     Right anterior shoulder, erythema and induration resolving     Interventions  Current support surface: atmos air  Current off-loading measures: " pillows/movement  Visual inspection of wound(s) completed  Wound Care: no dressing required   Supplies: Sween-24 for  feet, discussed with patient, patient verbalized understanding   Education provided: Discussed POC and no need for dressings  Discussed plan of care with Nursing and patient     Roberta SPAIN   1st choice: Securely message with [employer] ([facility] [employer])   (2nd option: [facility] Office Phone [phone], messages checked periodically Mon-Fri 8a-4p)

## 2023-09-29 NOTE — PROGRESS NOTES
Initial Psychiatric Consult   Consult date: September 25, 2023         Reason for Consult, requesting source:      intentional drug overdose     Requesting source: Wen Parker    Labs and imaging reviewed. Patient seen and evaluated by Karley Piña MD          HPI:     This is a 63-year-old female admitted on 9/23/2023 in the setting of overdose on number 9 tablets of morphine ER 60 mg, number 5 tablets of OxyContin 30 mg and an unknown number of Ambien.  Patient's son found her down, as she had been staying with him.  She was covered in her own vomit, which is described as coffee-ground like emesis.  EMS were activated, and patient apparently revived once Narcan was given at the scene.  Patient has chronic pain.   She apparently had a CK greater than 22,000.  CT head was negative.  Patient had a transaminitis, with AST climbing to almost 1100.  Acetaminophen level was negative.    Patient seen today after reviewing the chart.  We had a lengthy conversation.  She is very open about the fact that she overdosed on her opiates.  She actually has not been prescribed opiates for many years, but had so many opiates stockpiled, that she has been taking them consistently for years now.  Most recently, she has been taking morphine ER a total of 30 mg/day.  She had not been taking OxyContin, she only did this when she OD'd.  It was difficult for me to tell whether she has been taking methadone consistently, or just when she OD'd.  She states that she took 60 mg total of methadone when she OD'd.    Patient agrees that she needs inpatient psychiatric hospitalization.  She did asked to have her Ambien back, because she has been on this for years    9/27/2023: Patient seen today for follow-up.  Discussed with Dr. Saucedo.  Patient continues with transaminitis, so psychiatric medications were held last night.  She reports she did not sleep at all, and feels like she is in opiate withdrawal.  Patient has oxycodone  written with a total of 20 mg available per 24 hours, equivalent to the morphine ER 30 mg daily she stated she was taking in the community.    I discussed holding on the patient's antidepressants with her.  She states that she feels pretty depressed, and does not feel equipped to dealing with her life at this point.  She continues to feel she needs an inpatient psychiatric hospitalization.  I told her we would restart her Ambien tonight and discussed with Dr. Saucedo.    9/28/2023: Patient reports feeling better today after getting a good night sleep last night, and getting a shower this morning.  She still depressed.  I have restarted low-dose Zoloft and Wellbutrin as we have discussed previously, since her liver transaminases are trending down.  She is denying suicidality, so agree with discontinuing sitter.    9/29/2023: Patient seen today for follow-up.  She reports that she did not sleep very well last night due to pain fullness.  She plans on spacing out her oxycodone so she is getting it before bedtime.  The Ambien did help her fall asleep.  She reports feeling very anxious today.  We discussed that this may be related to starting Wellbutrin, which is one of her goals.  She did state that low-dose Xanax helped her in the past.  I cautioned her about use of Xanax along with the other medication she is on.  She would like to try a small dose, and this may enable us to get her antidepressants to therapeutic levels more quickly.        Past Psychiatric History:   From prior psychiatric note found from 2008, the patient has a history of cutting her wrist at age 17 and attempted suicide.  This note stated that she had been in therapy on and off.  She had a diagnosis of attention deficit disorder in the past and had been taking Ritalin at that time.  She is also had trials of Zoloft and Prozac.    Patient states that she had been on Zoloft and Wellbutrin until 2 months ago, when she stopped taking them abruptly.  She  states that she feels like she spiraled in terms of her mental health because of that.        Substance Use and History:      I see no prescriptions for opiate analgesia, or any controlled substances aside from Ambien this year on PDMP.        Past Medical History:   PAST MEDICAL HISTORY:   Past Medical History:   Diagnosis Date    Depression     Erythromelalgia (H)        PAST SURGICAL HISTORY: No past surgical history on file.          Family History:   FAMILY HISTORY: No family history on file.    Family Psychiatric History:         Social History:   SOCIAL HISTORY:   Social History     Tobacco Use    Smoking status: Never    Smokeless tobacco: Never   Substance Use Topics    Alcohol use: Not on file       Patient recently broke up with her partner of 25 years and went back with her ex- who she  30 years ago.  Then it sounds like she broke up with the ex-, and was considering renting a room from the ex-boyfriend.  Family is not in support of this.         Physical ROS:   The 10 point Review of Systems is negative other than noted in the HPI or here.           Medications:      buPROPion  100 mg Oral Daily with breakfast    gabapentin  300 mg Oral TID    hydrOXYzine  25 mg Oral Q6H    Or    hydrOXYzine  50 mg Oral Q6H    methocarbamol  500 mg Oral TID    polyethylene glycol  17 g Oral Daily    pramox-pe-glycerin-petrolatum   Rectal BID    sertraline  25 mg Oral Daily with breakfast    sodium chloride (PF)  3 mL Intracatheter Q8H              Allergies:   No Known Allergies       Labs:     Recent Results (from the past 48 hour(s))   Potassium    Collection Time: 09/27/23 10:47 PM   Result Value Ref Range    Potassium 3.5 3.4 - 5.3 mmol/L   Comprehensive metabolic panel    Collection Time: 09/28/23  8:33 AM   Result Value Ref Range    Sodium 142 135 - 145 mmol/L    Potassium 3.4 3.4 - 5.3 mmol/L    Carbon Dioxide (CO2) 21 (L) 22 - 29 mmol/L    Anion Gap 10 7 - 15 mmol/L    Urea Nitrogen 5.5 (L)  "8.0 - 23.0 mg/dL    Creatinine 0.51 0.51 - 0.95 mg/dL    GFR Estimate >90 >60 mL/min/1.73m2    Calcium 8.5 (L) 8.8 - 10.2 mg/dL    Chloride 111 (H) 98 - 107 mmol/L    Glucose 105 (H) 70 - 99 mg/dL    Alkaline Phosphatase 51 35 - 104 U/L     (H) 0 - 45 U/L     (H) 0 - 50 U/L    Protein Total 5.6 (L) 6.4 - 8.3 g/dL    Albumin 3.1 (L) 3.5 - 5.2 g/dL    Bilirubin Total 0.4 <=1.2 mg/dL   CK total    Collection Time: 09/28/23  8:33 AM   Result Value Ref Range    CK 6,146 (HH) 26 - 192 U/L   CK total    Collection Time: 09/28/23 11:22 AM   Result Value Ref Range    CK 6,689 (HH) 26 - 192 U/L   Comprehensive metabolic panel    Collection Time: 09/29/23  8:46 AM   Result Value Ref Range    Sodium 143 135 - 145 mmol/L    Potassium 3.5 3.4 - 5.3 mmol/L    Carbon Dioxide (CO2) 24 22 - 29 mmol/L    Anion Gap 10 7 - 15 mmol/L    Urea Nitrogen 6.9 (L) 8.0 - 23.0 mg/dL    Creatinine 0.59 0.51 - 0.95 mg/dL    GFR Estimate >90 >60 mL/min/1.73m2    Calcium 8.8 8.8 - 10.2 mg/dL    Chloride 109 (H) 98 - 107 mmol/L    Glucose 88 70 - 99 mg/dL    Alkaline Phosphatase 57 35 - 104 U/L     (H) 0 - 45 U/L     (H) 0 - 50 U/L    Protein Total 6.0 (L) 6.4 - 8.3 g/dL    Albumin 3.3 (L) 3.5 - 5.2 g/dL    Bilirubin Total 0.4 <=1.2 mg/dL   CK total    Collection Time: 09/29/23  8:46 AM   Result Value Ref Range    CK 3,767 (HH) 26 - 192 U/L          Physical and Psychiatric Examination:     /73 (BP Location: Left arm)   Pulse 85   Temp 98.6  F (37  C) (Oral)   Resp 16   Ht 1.702 m (5' 7\")   Wt 70.9 kg (156 lb 4.8 oz)   SpO2 100%   BMI 24.48 kg/m    Weight is 156 lbs 4.8 oz  Body mass index is 24.48 kg/m .    Physical Exam:  I have reviewed the physical exam as documented by by the medical team and agree with findings and assessment and have no additional findings to add at this time.    Mental Status Exam:    Appearance: awake, alert, appeared as age stated, alert, and cooperative  Attitude:  cooperative  Eye " Contact:  good  Mood:  anxious and depressed  Affect:  mood congruent  Speech:  clear, coherent  Language: Fluent in english   Psychomotor Behavior:  no evidence of tardive dyskinesia, dystonia, or tics  Thought Process:  logical  Associations:  no loose associations  Thought Content:  no evidence of suicidal ideation or homicidal ideation, no evidence of psychotic thought, no auditory hallucinations present, no visual hallucinations present, and patient presented with suicide attempt via overdose, and indicates she still feels like she cannot cope with her life very well.  She contracts for safety.  Insight:  fair  Judgement:  fair  Oriented to:  time, person, and place  Attention Span and Concentration:  intact  Recent and Remote Memory:  intact  Fund of Knowledge: Appropriate   Gait and Station:                DSM-5 Diagnosis:   Overdose on opiate analgesia, intentional, suicide attempt, subsequent encounter    Mood disorder due to a general medical condition    Chronic pain    Erythromelalgia    Rhabdomyolysis    Major depressive disorder, recurrent, severe    Anxiety              Assessment:     This is a 63-year-old female who reports depression as long as she can remember.  This undoubtedly is made worse by her chronic erythromelalgia, and the pain that goes along with this.  It also has a huge impact on her functioning and quality of life.  She abruptly stopped taking her antidepressants 2 months ago, and spiraled.  She also states that she has been taking opiate analgesia that she had been stockpiling for many years.  She had saved enough medication, that it sounds like she has been using stockpiled medications for her pain for several years.  I got confused about the timeline.  Basically, she has been taking morphine ER a total of 30 mg p.o. daily.  She does not feel like she is in opiate withdrawal now, but also took as much as 60 mg of methadone when she overdosed.    Patient is agreeable to going to an  inpatient psychiatric hospitalization.  Would recommend that we hold off on Zoloft and Wellbutrin, in favor of Cymbalta which actually may give her some benefit for her pain.  Patient continues to have significant rhabdomyolysis.  Would recommend that we hold off on the trial of Cymbalta until the rhabdomyolysis has substantially cleared.    9/26/2023: Patient states she is beginning to feel better.  She would like to discuss going back on opiate analgesia.  I indicated to her that a pain consult may be helpful in helping her ascertain what is most appropriate for her.    She is still feeling depressed.  I did talk to her about going on Cymbalta, which she stated was not helpful for her in the past.  We talked about the different options in terms of restarting medications.  Of the options, she prefers to restart both Zoloft and Wellbutrin together, because this was most helpful for her.  We discussed an initial starting dose, and I will follow along.  Have also restarted patient's Ambien.    Patient is having increased anxiety.  It does not sound like this is opiate withdrawal related.  She did have pain that kept her up last night, and she is going to refine how she takes her as needed oxycodone.    She may be having some activation related to restarting her antidepressants.  We will write for very low-dose Xanax as needed.  Cautioned her about potential side effects, particularly when using some of the other medication she is on, particularly opiate analgesia.  Told her to be careful about ambulating.          Summary of Recommendations:   1.  We will discontinue hold.  Patient is willing to go to inpatient psychiatry voluntarily.  If she changes her mind, place her back on a hold please, and we will petition for commitment.    2.  Agree with discontinuing sitter.  Patient is denying current suicidal ideation and contracted for safety with me.    3.  Have restarted Ambien 10 mg p.o. nightly as needed for insomnia.   This reflects the patient's home dose    4.  Have restarted Wellbutrin  mg p.o. every morning and Zoloft 25 mg p.o. every morning per previous discussion with patient now that transaminases have trended down.    5.  Have written for low-dose Xanax 0.25 mg p.o. twice daily as needed to help with anxiety and activation since we have restarted her antidepressants    6.  Patient will require an inpatient psychiatric bed.  We will continue to treat while she is here on this unit, awaiting transfer to psychiatry.  Please let behavioral health [facility] know when the patient has been medically cleared.    Karley Piña MD  Consult/Liaison Psychiatry and Addiction Medicine  [facility]

## 2023-09-29 NOTE — PROGRESS NOTES
[facility]    Hospitalist Progress Note    Assessment & Plan   Intentional prescription drug overdose  Opioid overdose   Suicide attempt    Chronic pain secondary to ERTHROMELAlgia of both feet   Pt presents to the ED on 9/23 with intentional overdose on multiple prescription drugs (varying reports including Ambien, morphine, methadone, oxycontin). She is only prescribed ambien PTA. The patient's son found her on the ground, face down covered in vomit. He found a suicide note. She reported to the ED provider that she suffers from chronic pain and wanted to end her life because she cannot deal with the pain anymore. EMS administered narcan and the patient became more alert.   * U tox positive for benzos, opiates, and cannabinoids   * 9/25 Mental status now at baseline.  * 9/26 pt starting to endorse some sweats, no fever. Perhaps starting to experience some opioid withdrawal   - Suicide precautions, bedside attendant   - Psychiatry consultation appreciated, will need inpatient Psychiatry placement when medically stable .  -pain medicine consult placed on 9/27 appreciate etheir help   Pts Wellbutrin SR   at 100mg Daily and Zoloft 25mg daily on hold due to elevted LFTS with Rhabdomyolysis   Ambien restarted at 10mg nightly PRN as its her chronic med   Pain medicine started her on Gbapentin 300mg PO TID , Robaxin 500mg TID ,  Hydroxyzine PRN and Oxycodone 5mg q 6hours pRN   Pain well controlled currently      Rhabdomyolysis   Transaminitis 2/2 above   R upper extremity, R lower extremity weakness likely 2/2 above vs peripheral nerve palsy from prolonged down time.   CK is elevated to >22,00 since 9/24 on admission. Suspect severe rhabdo from prolonged down-time. Orthopedics consulted. No clinical indication of compartment syndrome. Transaminases are also markedly elevated likely 2/2 rhabdo.   * Pt had been endorsing RUE and RLE weakness. MRI obtained and ruled out stroke, suspect weakness is  2/2 rhabdomyolysis.  * Renal function is stable.  CK is trending down, LFTs are trending down, will continue checking it daily but patient should be able to tolerate enough fluids.  - Intake/output , and OT evaluated the patient, discussed with nursing that patient can be discharged to inpatient psychiatry when bed available.       Elevated blood pressure without underlying diagnosis of HTN  - As needed hydralazine 5 mg IV for systolic blood pressure over 170 every 6 hours as needed.  BP currently in the 120s      Oropharynx laceration inthe back of her throat  - Unknown etiology  - Continue to follow-up.     Depression/anxiety/insomnia  - Psychiatry consulted as noted above  - Sertraline and wellbutrin resumed     History of HLD  Vitamin D deficiency  Osteoporosis  -No treatment found.  -Patient can follow-up with her primary doctor, after discharge.      DVT Prophylaxis: Pneumatic Compression Devices  Code Status: Full Code     36 MINUTES SPENT BY ME on the date of service doing chart review, history, exam, documentation & further activities per the note.  Disposition: Expected discharge bed available for inpatient psychiatric treatment.  She is medically stable for discharge.  Clinically Significant Risk Factors              # Hypoalbuminemia: Lowest albumin = 3.1 g/dL at 9/28/2023  8:33 AM, will monitor as appropriate                       Nikkie Mcnally MD  Text Page   (7am to 6pm)    Interval History   Patient is resting comfortably, she felt slightly depressed in the morning but currently she is feeling okay.    -Data reviewed today: I reviewed all new labs and imaging results over the last 24 hours.   Physical Exam     Vital Signs with Ranges  Temp:  [97.1  F (36.2  C)-98.6  F (37  C)] 98.6  F (37  C)  Pulse:  [76-85] 85  Resp:  [16-20] 16  BP: (114-146)/(53-73) 114/73  SpO2:  [96 %-100 %] 100 %  No intake/output data recorded.    Constitutional: Awake, alert, cooperative, no apparent distress  Respiratory:  Clear to auscultation bilaterally, no crackles or wheezing  Cardiovascular: Regular rate and rhythm, normal S1 and S2, and no murmur noted  GI: Normal bowel sounds, soft, non-distended, non-tender  Skin/Integumen: Lateral lower extremity edema and erythema noted, in the toes  Neuro : moving all 4 extremities, no focal deficit noted     Medications      buPROPion  100 mg Oral Daily with breakfast    gabapentin  300 mg Oral TID    hydrOXYzine  25 mg Oral Q6H    Or    hydrOXYzine  50 mg Oral Q6H    methocarbamol  500 mg Oral TID    polyethylene glycol  17 g Oral Daily    pramox-pe-glycerin-petrolatum   Rectal BID    sertraline  25 mg Oral Daily with breakfast    sodium chloride (PF)  3 mL Intracatheter Q8H       Data   Recent Labs   Lab 09/29/23  0846 09/28/23  0833 09/27/23  2247 09/27/23  1153 09/27/23  0542 09/26/23  1700 09/26/23  1100 09/25/23  0633 09/24/23  1759 09/24/23  0943 09/23/23  1825 09/23/23  1009   WBC  --   --   --   --   --   --  5.6  --   --  11.9*  --  15.4*   HGB  --   --   --   --   --   --  10.7*  --   --  10.9*  --  13.7   MCV  --   --   --   --   --   --  90  --   --  91  --  92   PLT  --   --   --   --   --   --  160  --   --  169  --  275   INR  --   --   --   --  1.00  --   --  1.07  --  1.19*  --   --     142  --   --  142  --  141 136   < > 136   < > 133*   POTASSIUM 3.5 3.4 3.5   < > 3.3*   < > 3.3* 3.8   < > 4.6   < > 5.5*   CHLORIDE 109* 111*  --   --  109*  --  109* 105   < > 104   < > 94*   CO2 24 21*  --   --  21*  --  24 24   < > 25   < > 22   BUN 6.9* 5.5*  --   --  3.0*  --  3.9* 5.5*   < > 9.4   < > 18.1   CR 0.59 0.51  --   --  0.46*  --  0.43* 0.48*   < > 0.49*   < > 0.95   ANIONGAP 10 10  --   --  12  --  8 7   < > 7   < > 17*   BRADY 8.8 8.5*  --   --  8.4*  --  8.5* 8.2*   < > 8.3*   < > 9.3   GLC 88 105*  --   --  89  --  103* 94   < > 96   < > 114*   ALBUMIN 3.3* 3.1*  --   --  3.4*  --  3.3* 3.2*  --  3.3*   < > 4.7   PROTTOTAL 6.0* 5.6*  --   --  6.0*  --  6.3* 5.8*   --   --    < > 8.5*   BILITOTAL 0.4 0.4  --   --  0.5  --  0.5 0.2  --   --    < > 0.2   ALKPHOS 57 51  --   --  52  --  56 53  --   --    < > 82   * 152*  --   --  197*  --  201* 188*  --   --    < > 58*   * 300*  --   --  598*  --  685* 773*  --   --    < > 232*    < > = values in this interval not displayed.     Recent Labs   Lab 09/29/23  0846 09/28/23  0833 09/27/23  0542 09/26/23  1100 09/25/23  0633   GLC 88 105* 89 103* 94       Imaging:   No results found for this or any previous visit (from the past 24 hour(s)).

## 2023-09-30 NOTE — TELEPHONE ENCOUNTER
"S:  [facility] [phone] , Charge Nurse Carie  calling at 5:02 pm about a 63 year old/Female presenting with intentional overdose and anxiety    B: Pt arrived via EMS. Presenting problem, stressors: Pt came to the ED on 9/23 after an of intentional overdose on Ambien and morphine.  Son reports he found his mother and a suicide note.  Son reports pt was face down on the floor covered in vomit.  Per RN pt is ambulating 500 ft independently, is voiding, is eating, has no need for IVs, and is medically cleared.  Per psych consult stressors may be \"Patient recently broke up with her partner of 25 years and went back with her ex- who she  30 years ago. Then it sounds like she broke up with the ex-, and was considering renting a room from the ex-boyfriend. Family is not in support of this. \"    Pt affect in ED: Anxious   Pt Dx: Major Depressive Disorder, Generalized Anxiety disorder  Previous [facility] hx? Unknown   Pt denies SI   Hx of suicide attempt? Yes: 9/23/23 9 AM intentional overdose  Pt  None reported in psych consult  Pt denies HI   Pt denies hallucinations .   Pt RARS Score: not completed    Hx of aggression/violence, sexual offenses, legal concerns, Epic care plan? describe: none reported  Current concerns for aggression this visit? No  Does pt have a history of Civil Commitment? No  Is Pt their own guardian? Yes    Pt is prescribed medication. Is patient medication compliant? No  Pt  Unknown.  Notes reports pt recently moved in with her son. Pt may have been residing in [place] prior to that.   CD concerns:  Pt reported she is not currently prescribed opiates but has a stock pile from several years that she has been taking. Pt is prescribed ambien.  After admission pt is now prescribed Xanax Ambien, and Oxycodone.   Acute or chronic medical concerns: Chronic pain  Does Pt present with specific needs, assistive devices, or exclusionary criteria? None      Pt is ambulatory  Pt is able to perform " ADLs independently      A: Pt to be reviewed for IP admission. Pt is Voluntary  Preferred placement: [facility]    COVID Symptoms: No  If yes, COVID test required   Utox: In process   CMP: Abnormalities: see labs for results  CBC: Abnormalities: see most recent lab for results  HCG: N/A    R: Patient cleared and ready for behavioral bed placement: Yes  Pt placed on IP worklist? Yes    Does Patient need a Transfer Center request created? Yes, writer completed Transfer Center request at: 5:34 pm

## 2023-09-30 NOTE — PLAN OF CARE
Summary: Intentional drug overdose/ suicide attempt   DATE & TIME: 9/29 NOC  Cognitive Concerns/ Orientation : A&Ox4; calm, cooperative, pleasant; denies suicidal ideations   BEHAVIOR & AGGRESSION TOOL COLOR: Green  CIWA SCORE: NA   ABNL VS/O2: VSS on RA  MOBILITY: Up ad paola  PAIN MANAGMENT: Severe pain on BLE Oxycodone given x2;uses lidocaine ointment to feet. Given lozenge for sore throat   DIET: Regular   BOWEL/BLADDER: Continent of B&B, no BM this shift  ABNL LAB/BG: K+ 3.5; CK 3767. ALT//209.  DRAIN/DEVICES: RFA PIV SL  TELEMETRY RHYTHM: n/a  SKIN: Dry, flaky, scattered bruising, redness on feet; chronic condition per patient  TESTS/PROCEDURES: None  D/C DATE: Pending - Will go to inpatient psych when medically ready.   OTHER IMPORTANT INFO: Pain Team, Psych and GI following. PT/OT following. Pt is no longer on suicide precautions.

## 2023-09-30 NOTE — PROGRESS NOTES
[facility]    Medicine Progress Note - Hospitalist Service    Date of Admission:  9/23/2023    Assessment & Plan   Intentional prescription drug overdose  Opioid overdose   Suicide attempt    Chronic pain secondary to ERTHROMELAlgia of both feet   Pt presents to the ED on 9/23 with intentional overdose on multiple prescription drugs (varying reports including Ambien, morphine, methadone, oxycontin). She is only prescribed ambien PTA. The patient's son found her on the ground, face down covered in vomit. He found a suicide note. She reported to the ED provider that she suffers from chronic pain and wanted to end her life because she cannot deal with the pain anymore. EMS administered narcan and the patient became more alert.   * U tox positive for benzos, opiates, and cannabinoids   * 9/25 Mental status now at baseline.  * 9/26 pt starting to endorse some sweats, no fever. Perhaps starting to experience some opioid withdrawal   - Admitted to inpatient  - Suicide precautions, bedside attendant   - Psychiatry consultation appreciated, will need inpatient Psychiatry placement when medically stable   -pain medicine consult placed on 9/27 appreciate etheir help   Pts Wellbutrin SR   at 100mg Daily and Zoloft 25mg daily on hold due to elevted LFTS with Rhabdomyolysis   Ambien restarted at 10mg nightly PRN as its her chronic med   Pain medicine started her on Gbapentin 300mg PO TID , Robaxin 500mg TID ,  Hydroxyzine PRN and Oxycodone 5mg q 6hours pRN   Pain well controlled currently     Patient's rhabdomyolysis much improved currently and she is ready for transfer to inpatient psych unit when bed is available  Discussed with charge RN today    Rhabdomyolysis   Transaminitis 2/2 above   R upper extremity, R lower extremity weakness likely 2/2 above vs peripheral nerve palsy from prolonged down time.   CK is elevated to >22,00 since 9/24 on admission. Suspect severe rhabdo from prolonged down-time.  Orthopedics consulted. No clinical indication of compartment syndrome. Transaminases are also markedly elevated likely 2/2 rhabdo.   * Pt had been endorsing RUE and RLE weakness. MRI obtained and ruled out stroke, suspect weakness is 2/2 rhabdo  * 9/26 CK is now starting to improve [id number] .  LFTs are improving.   * Renal function is stable.   - Intake/output   - Daily CK and LFTs   - PT/OT   Stop IVF today and monitor CPK with time it should continue to improve at this point   Pt is eating,  drinking well currently   CPK much improved to 3K today  We will recheck CPK and CMP in 1 week  Patient is ready for discharge to inpatient psych unit    Elevated blood pressure without underlying diagnosis of HTN  - As needed hydralazine 5 mg IV for systolic blood pressure over 170 every 6 hours as needed.  BP currently in the 120s      Oropharynx laceration inthe back of her throat  - Unknown etiology  - Continue to follow-up.     Depression/anxiety/insomnia  - Psychiatry consulted as noted above  - Sertraline and wellbutrin resumed     History of HLD  Vitamin D deficiency  Osteoporosis  -No treatment found.  -Patient can follow-up with her primary doctor, after discharge.     Diet: Combination Diet Regular Diet Adult; Safe Tray - with utensils  Snacks/Supplements Pediatric: Ensure Enlive; With Meals  Diet    DVT Prophylaxis: Pneumatic Compression Devices  Bragg Catheter: Not present  Lines: None     Cardiac Monitoring: None  Code Status: Full Code      Clinically Significant Risk Factors              # Hypoalbuminemia: Lowest albumin = 3.1 g/dL at 9/28/2023  8:33 AM, will monitor as appropriate                       Disposition Plan      Expected Discharge Date: 09/30/2023,  3:00 PM                Karen Saucedo MD  Hospitalist Service  [facility]  Securely message with [employer] (more info)  Text page via [employer] Paging/Directory    ______________________________________________________________________    Interval History   Pt is doing well. Eating, drinking well, CPK much improved. Pain well controlled. Denies N/V. Psychiatry is following. No other acute issues     Physical Exam   Vital Signs: Temp: 99.2  F (37.3  C) Temp src: Oral BP: 91/51 Pulse: 87   Resp: 16 SpO2: 99 % O2 Device: None (Room air)    Weight: 156 lbs 4.8 oz    Constitutional: Awake, alert, cooperative, no apparent distress.  Eyes: Conjunctiva and pupils examined and normal.  HEENT: Moist mucous membranes, normal dentition.  Respiratory: Non-labored breathing   Cardiovascular: Regular rate and rhythm  Skin: No rashes, no cyanosis, RLE 1+ edema   Musculoskeletal: No joint swelling, erythema or tenderness. Soft compartments   Neurologic: Cranial nerves 2-12 intact, normal strength and sensation.  Psychiatric: Alert, oriented to person, place and time, no obvious anxiety or depression.      Medical Decision Making       52 MINUTES SPENT BY ME on the date of service doing chart review, history, exam, documentation & further activities per the note.      Data     I have personally reviewed the following data over the past 24 hrs:    N/A  \   N/A   / N/A     139 105 7.4 (L) /  104 (H)   3.8 25 0.59 \     ALT: 122 (H) AST: 133 (H) AP: 54 TBILI: 0.4   ALB: 3.3 (L) TOT PROTEIN: 6.0 (L) LIPASE: N/A       Imaging results reviewed over the past 24 hrs:   No results found for this or any previous visit (from the past 24 hour(s)).

## 2023-10-01 NOTE — PLAN OF CARE
Goal Outcome Evaluation:                      Summary: Intentional drug overdose/ suicide attempt   DATE & TIME: 9/30 07-19  Cognitive Concerns/ Orientation : A&Ox4; calm, cooperative, pleasant; denies suicidal ideations   BEHAVIOR & AGGRESummary: Intentional drug overdose/ suicide attempt ALBERTO TOOL COLOR: Green  CIWA SCORE: NA   ABNL VS/O2: VSS on RA  MOBILITY: Up ad paola  PAIN MANAGMENT: Severe pain on BLE Oxycodone given x1;uses lidocaine ointment to feet. Given lozenge for sore throat , ok to have her home lozenges at bedside per MD.  DIET: Regular   BOWEL/BLADDER: Continent of B&B, no BM this shift  ABNL LAB/BG: K+ 3.8; CK 1942 . ALT//133  DRAIN/DEVICES: RFA PIV SL  TELEMETRY RHYTHM: n/a  SKIN: Dry, flaky, scattered bruising, redness on feet; chronic condition per patient  TESTS/PROCEDURES: None  D/C DATE: Pending - Will go to inpatient psych when bed available   OTHER IMPORTANT INFO: Pain Team, Psych and GI following. PT/OT following. Pt is no longer on suicide precautions.Xanax given x2 for anxiety

## 2023-10-01 NOTE — TELEPHONE ENCOUNTER
S:  Pt requests [facility] only :   S: [place] [facility] [place]patient [place] Call Log 10/1/23 8:40 AM    Intake has called facilities that have not updated the bed status within the last 12 hours.                             [facility] is at capacity.             [facility] is posting 0 beds. [phone].    [facility] is posting 0 beds. Negative covid required.                [facility] is posting 0 bed. Negative covid required. [phone]  Per call @8:40am, at [facility] is posting 0 beds.       [facility] is posting 0 beds. [phone].   [facility] is posting 4 beds Call for details. Negative covid.  [phone]. Per call @8:19am, no answer   This is young adult only.  [facility] is posting 0 beds          [facility] ([facility] is posting 0 beds.    Continue to seek placement.

## 2023-10-01 NOTE — PLAN OF CARE
Summary: Intentional drug overdose/ suicide attempt   DATE & TIME: 9/30 NOC  Cognitive Concerns/ Orientation : A&Ox4; calm, cooperative, pleasant; denies suicidal ideations   BEHAVIOR & AGGRESummary: Intentional drug overdose/ suicide attempt ALBERTO TOOL COLOR: Green  CIWA SCORE: NA   ABNL VS/O2: VSS on RA  MOBILITY: Up ad paola  PAIN MANAGMENT: Severe pain on BLE Oxycodone given x1;uses lidocaine ointment to feet. Ok to have her home lozenges at bedside per MD.  DIET: Regular   BOWEL/BLADDER: Continent of B&B, no BM this shift  ABNL LAB/BG: K+ 3.8; CK 1942 . ALT//133  DRAIN/DEVICES: RFA PIV SL  TELEMETRY RHYTHM: n/a  SKIN: Dry, flaky, scattered bruising, redness on feet; chronic condition per patient  TESTS/PROCEDURES: None  D/C DATE: Pending - Will go to inpatient psych when bed available   OTHER IMPORTANT INFO: Pain Team, Psych and GI following. PT/OT following. Pt is no longer on suicide precautions.

## 2023-10-01 NOTE — PLAN OF CARE
Goal Outcome Evaluation:                    Summary: Intentional drug overdose/ suicide attempt   DATE & TIME: 10/1 07-19  Cognitive Concerns/ Orientation : A&Ox4; calm, cooperative, pleasant; denies suicidal ideations   BEHAVIOR & AGGRESummary: Intentional drug overdose/ suicide attempt  COLOR: Green  CIWA SCORE: NA   ABNL VS/O2: VSS on RA  MOBILITY: Up ad paola  PAIN MANAGMENT: Severe pain on BLE Oxycodone given x1;uses lidocaine ointment to feet. Ok to have her home lozenges at bedside per MD.  DIET: Regular , fair apetite  BOWEL/BLADDER: Continent of B&B, no BM this shift  ABNL LAB/BG: na  DRAIN/DEVICES: RFA PIV SL  TELEMETRY RHYTHM: n/a  SKIN: Dry, flaky, scattered bruising, redness on feet; chronic condition per patient  TESTS/PROCEDURES: None  D/C DATE: Pending - Will go to inpatient psych when bed available   OTHER IMPORTANT INFO: Pain Team, Psych and GI following. PT/OT following. Pt is no longer on suicide precautions.

## 2023-10-01 NOTE — PROGRESS NOTES
[facility]    Medicine Progress Note - Hospitalist Service    Date of Admission:  9/23/2023    Assessment & Plan   Intentional prescription drug overdose  Opioid overdose   Suicide attempt    Chronic pain secondary to ERTHROMELAlgia of both feet   Pt presents to the ED on 9/23 with intentional overdose on multiple prescription drugs (varying reports including Ambien, morphine, methadone, oxycontin). She is only prescribed ambien PTA. The patient's son found her on the ground, face down covered in vomit. He found a suicide note. She reported to the ED provider that she suffers from chronic pain and wanted to end her life because she cannot deal with the pain anymore. EMS administered narcan and the patient became more alert.   * U tox positive for benzos, opiates, and cannabinoids   * 9/25 Mental status now at baseline.  * 9/26 pt starting to endorse some sweats, no fever. Perhaps starting to experience some opioid withdrawal   - Admitted to inpatient  - Suicide precautions, bedside attendant   - Psychiatry consultation appreciated, will need inpatient Psychiatry placement when medically stable   -pain medicine consult placed on 9/27 appreciate etheir help   Pts Wellbutrin SR   at 100mg Daily and Zoloft 25mg daily on hold due to elevted LFTS with Rhabdomyolysis   Ambien restarted at 10mg nightly PRN as its her chronic med   Pain medicine started her on Gbapentin 300mg PO TID , Robaxin 500mg TID ,  Hydroxyzine PRN and Oxycodone 5mg q 6hours pRN   Pain well controlled currently     Patient's rhabdomyolysis much improved currently and she is medically ready for discharge to inpatient psych unit when bed is available  Discussed with charge RN today    Rhabdomyolysis   Transaminitis 2/2 above   R upper extremity, R lower extremity weakness likely 2/2 above vs peripheral nerve palsy from prolonged down time.   CK is elevated to >22,00 since 9/24 on admission. Suspect severe rhabdo from prolonged  down-time. Orthopedics consulted. No clinical indication of compartment syndrome. Transaminases are also markedly elevated likely 2/2 rhabdo.   * Pt had been endorsing RUE and RLE weakness. MRI obtained and ruled out stroke, suspect weakness is 2/2 rhabdo  * 9/26 CK is now starting to improve [id number] .  LFTs are improving.   * Renal function is stable.   - Intake/output   - Daily CK and LFTs   - PT/OT   Stop IVF today and monitor CPK with time it should continue to improve at this point   Pt is eating,  drinking well currently   CPK much improved to 3K-1900 today  We will recheck CPK and CMP in 1 week  Patient is ready for discharge to inpatient psych unit    Elevated blood pressure without underlying diagnosis of HTN  - As needed hydralazine 5 mg IV for systolic blood pressure over 170 every 6 hours as needed.  BP currently in the 120s      Oropharynx laceration inthe back of her throat  - Unknown etiology  - Continue to follow-up.     Depression/anxiety/insomnia  - Psychiatry consulted as noted above  - Sertraline and wellbutrin resumed     History of HLD  Vitamin D deficiency  Osteoporosis  -No treatment found.  -Patient can follow-up with her primary doctor, after discharge.     Diet: Diet  Combination Diet Regular Diet Adult    DVT Prophylaxis: Pneumatic Compression Devices  Bragg Catheter: Not present  Lines: None     Cardiac Monitoring: None  Code Status: Full Code      Clinically Significant Risk Factors              # Hypoalbuminemia: Lowest albumin = 3.1 g/dL at 9/28/2023  8:33 AM, will monitor as appropriate                       Disposition Plan      Expected Discharge Date: 10/02/2023,  3:00 PM                Karen Saucedo MD  Hospitalist Service  [facility]  Securely message with [employer] (more info)  Text page via [facility] Paging/Directory   ______________________________________________________________________    Interval History   Pt is doing well. Eating, drinking well, CPK  much improved.  Patient complains of ongoing intermittent right thigh pain.  Discussed with her that with her chronic pain issues we are looking at managing her pain where she is functional with her  day-to-day life and not looking it completely taking her pain away.  She is able to ambulate in the room without issues during my visit today.  Pain medicine team recommended not to prescribe any narcotics for her on discharge.  This was relayed to patient by me today.  She understood and agreeable to current plan of care    Physical Exam   Vital Signs: Temp: 98.2  F (36.8  C) Temp src: Oral BP: 97/51 Pulse: 82   Resp: 18 SpO2: 99 % O2 Device: None (Room air)    Weight: 155 lbs 3.26 oz    Constitutional: Awake, alert, cooperative, no apparent distress.  Eyes: Conjunctiva and pupils examined and normal.  HEENT: Moist mucous membranes, normal dentition.  Respiratory: Non-labored breathing   Cardiovascular: Regular rate and rhythm  Skin: No rashes, no cyanosis, RLE 1+ edema   Musculoskeletal: No joint swelling, erythema or tenderness. Soft compartments   Neurologic: Cranial nerves 2-12 intact, normal strength and sensation.  Psychiatric: Alert, oriented to person, place and time, no obvious anxiety or depression.      Medical Decision Making       52 MINUTES SPENT BY ME on the date of service doing chart review, history, exam, documentation & further activities per the note.      Data         Imaging results reviewed over the past 24 hrs:   No results found for this or any previous visit (from the past 24 hour(s)).

## 2023-10-01 NOTE — TELEPHONE ENCOUNTER
[place]: [place]  [place] Inpatient [place] Call Log 9/30/23 5:30pm   Intake has called facilities that have not updated the bed status within the last 12 hours.                               [facility] is at capacity.              [facility] is posting 0 beds. [phone].    [facility] is posting 0 beds. Negative covid required.                 [facility] is posting 0 bed. Negative covid required. [phone]. Per call @ 7:47 am call with specific patient no high acuity.   [facility] is posting 0 beds.        [facility] is posting 0 beds. [phone].    [facility] is posting 1 bed Call for details. Negative covid.  [phone]. Per call @ 7:45 AM to Melody 1 YA, a couple adolescent and no child bed avail.   [facility] is posting 0 beds           [facility] ([facility] is posting 0 beds.

## 2023-10-02 NOTE — PROGRESS NOTES
IP MH Referral Acuity Rating Score (RARS)    LMHP complete at referral to IP MH, with DEC; and, daily while awaiting IP MH placement. Call score to PPS.  CRITERIA SCORING   New 70 HH and Involuntary for IP MH (not adolescent) 0/1   Boarding over 24 hours 1/1   Vulnerable adult at least 55+ with multiple co morbidities; or, Patient age 11 or under 1/1   Suicide ideation without relief of precipitating factors 0/1   Current plan for suicide 0/1   Current plan for homicide 0/1   Imminent risk or actual attempt to seriously harm another without relief of factors precipitating the attempt 0/1   Severe dysfunction in daily living (ex: complete neglect for self care, extreme disruption in vegetative function, extreme deterioration in social interactions) 1/1   Recent (last 2 weeks) or current physical aggression in the ED 0/1   Restraints or seclusion episode in ED 0/1   Verbal aggression, agitation, yelling, etc., while in the ED 0/1   Active psychosis with psychomotor agitation or catatonia 0/1   Need for constant or near constant redirection (from leaving, from others, etc).  0/1   Intrusive or disruptive behaviors 0/1   TOTAL Acuity Total Score: 3

## 2023-10-02 NOTE — PLAN OF CARE
Goal Outcome Evaluation:      Plan of Care Reviewed With: patient    Overall Patient Progress: improvingOverall Patient Progress: improving    Summary: Intentional drug overdose/ suicide attempt   DATE & TIME: 10/2/23 [id number]  Cognitive Concerns/ Orientation : A&Ox4; calm, cooperative, pleasant; denies suicidal ideations   BEHAVIOR & AGGRESSION TOOL COLOR: Green  CIWA SCORE: NA   ABNL VS/O2: VSS, O2 96% RA   MOBILITY: independent, steady  PAIN MANAGMENT: BLE pain Rt>Lt,  Oxycodone given x1; uses lidocaine ointment to feet. Also received prn IM Tramadol x1 for inflammatory pain. (Pt doesn't have IV access, pt okay with IM).    DIET: Regular , fair apetite  BOWEL/BLADDER: Continent of B&B, no BM this shift  ABNL LAB/BG: CK total 649, ALT 88, AST 59,   DRAIN/DEVICES: no PIV access, MD aware.   TELEMETRY RHYTHM: n/a  SKIN: Dry, flaky, scattered bruising, redness /swelling on feet; chronic condition per patient, +3 edema on the RLE. Right arm partially paralyzed.   TESTS/PROCEDURES: s/p US LE, negative for DVT, noted enlarged lymph in the Rt groin  D/C DATE: Pending - Will go to inpatient psych when bed available   OTHER IMPORTANT INFO: Pain Team, Psych and GI following. PT/OT following. Pt is no longer on suicide precautions.

## 2023-10-02 NOTE — TELEPHONE ENCOUNTER
[place]: [place]  [place] Inpatient Bed Call Log 10/1/23 3:45pm    Intake has called facilities that have not updated the bed status within the last 12 hours.                             [facility] is at capacity.             [facility] is posting 0 beds. [phone].    [facility] is posting 0 beds. Negative covid required.                [facility] is posting 0 bed. Negative covid required. [phone]  Per call @8:40am, at capacity  [facility] is posting 0 beds.       [facility] is posting 0 beds. [phone].   [facility] is posting 1 bed Call for details. Negative covid.  [phone]. Per call @8:19am, no answer.  No psychosis. SI only. Low acuity only.    [facility] is posting 0 beds          [facility] is posting 0 beds.

## 2023-10-02 NOTE — TELEPHONE ENCOUNTER
R: [place]  Access Inpatient Bed Call Log 10/2/23 7:30 AM    Intake has called facilities that have not updated the bed status within the last 12 hours.  ([facility])                  [facility] is at capacity.              [facility] is posting 0 beds. [phone].     [facility] is posting 0 beds. Negative covid required.  10/2 Per call @ 10:05 am to Marcie currently at capacity until 10/3.                [facility] is posting 0 bed. Negative covid required. [phone]. 10/2 Per call @7:50 am to [facility] no beds available.   [facility] is posting 0 beds.        [facility] is posting 0 beds. [phone].    [facility] is posting 1 bed Call for details. Negative covid.  [phone]. Per call @7:40 am to Beba a couple child and adolescent Female, no male beds and a few YA. 10/2 Pt not appropriate d/t facility restrictions.  [facility] is posting 0 beds           [facility] is posting 0 beds. 10/2 Per call @ 10:05 am to Marcie currently at capacity until 10/3.          Patient remains on the work list pending appropriate bed availability.

## 2023-10-02 NOTE — PROGRESS NOTES
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 She is only prescribed ambien PTA. The patient's son found her on the ground, face down covered in vomit. He found a suicide note. She reported to the ED provider that she suffers from chronic pain and wanted to end her life because she cannot deal with the pain anymore. EMS administered narcan and the patient became more alert.   * U tox positive for benzos, opiates, and cannabinoids   * 9/25 Mental status now at baseline.  * 9/26 pt starting to endorse some sweats, no fever. Perhaps starting to experience some opioid withdrawal   - Admitted to inpatient  - Suicide precautions, bedside attendant   - Psychiatry consultation appreciated, will need inpatient Psychiatry placement when medically stable   -pain medicine consult placed on 9/27 appreciate etheir help   Pts Wellbutrin SR   at 100mg Daily and Zoloft 25mg daily on hold due to elevted LFTS with Rhabdomyolysis   Ambien restarted at 10mg nightly PRN as its her chronic med   Pain medicine started her on Gbapentin 300mg PO TID , Robaxin 500mg TID ,  Hydroxyzine PRN and Oxycodone 5mg q 6hours pRN   Pain well controlled currently     Patient's rhabdomyolysis much improved currently and she is medically ready for discharge to inpatient psych unit when bed is available  Discussed with charge RN today    Rhabdomyolysis   Transaminitis 2/2 above   R upper extremity, R lower extremity weakness likely 2/2 above vs peripheral nerve palsy from prolonged down time.   CK is elevated to >22,00 since 9/24 on admission. Suspect severe rhabdo from prolonged  down-time. Orthopedics consulted. No clinical indication of compartment syndrome. Transaminases are also markedly elevated likely 2/2 rhabdo.   * Pt had been endorsing RUE and RLE weakness. MRI obtained and ruled out stroke, suspect weakness is 2/2 rhabdo  * 9/26 CK is now starting to improve [id number]-1C .  LFTs are improving.   * Renal function is stable.   - Intake/output   - Daily CK and LFTs   - PT/OT   Stop IVF today and monitor CPK with time it should continue to improve at this point   Pt is eating,  drinking well currently   CPK much improved to 3K-1900   Repeat U/S RLE with doppler to r/o DVT.  We will recheck CPK and CMP in 1 week  Patient is ready for discharge to inpatient psych unit    Elevated blood pressure without underlying diagnosis of HTN  - As needed hydralazine 5 mg IV for systolic blood pressure over 170 every 6 hours as needed.  BP currently in the 120s      Oropharynx laceration inthe back of her throat  - Unknown etiology  - Continue to follow-up.     Depression/anxiety/insomnia  - Psychiatry consulted as noted above  - Sertraline and wellbutrin resumed     History of HLD  Vitamin D deficiency  Osteoporosis  -No treatment found.  -Patient can follow-up with her primary doctor, after discharge.       Diet: Diet  Combination Diet Regular Diet Adult    DVT Prophylaxis: Pneumatic Compression Devices  Bragg Catheter: Not present  Lines: None     Cardiac Monitoring: None  Code Status: Full Code      Clinically Significant Risk Factors              # Hypoalbuminemia: Lowest albumin = 3.1 g/dL at 9/28/2023  8:33 AM, will monitor as appropriate                       Disposition Plan     Expected Discharge Date: 10/02/2023,  3:00 PM                James Mota MD  Hospitalist Service  [facility]  Securely message with [employer] (more info)  Text page via [facility] Paging/Directory   ______________________________________________________________________    Interval History     No  fevers/chills/chest pain/SOB.    Physical Exam   Vital Signs: Temp: 97.8  F (36.6  C) Temp src: Oral BP: 104/63 Pulse: 74   Resp: 18 SpO2: 96 % O2 Device: None (Room air)    Weight: 157 lbs 6.54 oz    Constitutional: Awake, alert, cooperative, no apparent distress.  Eyes: Conjunctiva and pupils examined and normal.  HEENT: Moist mucous membranes, normal dentition.  Respiratory: Non-labored breathing   Cardiovascular: Regular rate and rhythm  Skin: No rashes, no cyanosis, RLE 1+ edema   Musculoskeletal: No joint swelling, erythema or tenderness. Soft compartments   Neurologic: Cranial nerves 2-12 intact, normal strength and sensation.  Psychiatric: Alert, oriented to person, place and time, no obvious anxiety or depression.    Medical Decision Making       60 MINUTES SPENT BY ME on the date of service doing chart review, history, exam, documentation & further activities per the note.      Data         Imaging results reviewed over the past 24 hrs:   No results found for this or any previous visit (from the past 24 hour(s)).

## 2023-10-02 NOTE — PLAN OF CARE
Summary: Intentional drug overdose/ suicide attempt   DATE & TIME: 10/1 NOC  Cognitive Concerns/ Orientation : A&Ox4; calm, cooperative, pleasant; denies suicidal ideations   BEHAVIOR & AGGRESummary: Intentional drug overdose/ suicide attempt  COLOR: Green  CIWA SCORE: NA   ABNL VS/O2: VSS on RA  MOBILITY: Up ad paola  PAIN MANAGMENT: Severe pain on BLE Oxycodone given x2;uses lidocaine ointment to feet. Ok to have her home lozenges at bedside per MD. Given xanax for anxiety.  DIET: Regular , fair apetite  BOWEL/BLADDER: Continent of B&B, no BM this shift  ABNL LAB/BG: na  DRAIN/DEVICES: RFA PIV SL  TELEMETRY RHYTHM: n/a  SKIN: Dry, flaky, scattered bruising, redness on feet; chronic condition per patient, +3 edema on the RLE. Right arm partially paralyzed   TESTS/PROCEDURES: None  D/C DATE: Pending - Will go to inpatient psych when bed available   OTHER IMPORTANT INFO: Pain Team, Psych and GI following. PT/OT following. Pt is no longer on suicide precautions.

## 2023-10-02 NOTE — PROGRESS NOTES
Initial Psychiatric Consult   Consult date: September 25, 2023         Reason for Consult, requesting source:      intentional drug overdose     Requesting source: Wen Parker    Labs and imaging reviewed. Patient seen and evaluated by Karley Piña MD          HPI:     This is a 63-year-old female admitted on 9/23/2023 in the setting of overdose on number 9 tablets of morphine ER 60 mg, number 5 tablets of OxyContin 30 mg and an unknown number of Ambien.  Patient's son found her down, as she had been staying with him.  She was covered in her own vomit, which is described as coffee-ground like emesis.  EMS were activated, and patient apparently revived once Narcan was given at the scene.  Patient has chronic pain.   She apparently had a CK greater than 22,000.  CT head was negative.  Patient had a transaminitis, with AST climbing to almost 1100.  Acetaminophen level was negative.    Patient seen today after reviewing the chart.  We had a lengthy conversation.  She is very open about the fact that she overdosed on her opiates.  She actually has not been prescribed opiates for many years, but had so many opiates stockpiled, that she has been taking them consistently for years now.  Most recently, she has been taking morphine ER a total of 30 mg/day.  She had not been taking OxyContin, she only did this when she OD'd.  It was difficult for me to tell whether she has been taking methadone consistently, or just when she OD'd.  She states that she took 60 mg total of methadone when she OD'd.    Patient agrees that she needs inpatient psychiatric hospitalization.  She did asked to have her Ambien back, because she has been on this for years    9/27/2023: Patient seen today for follow-up.  Discussed with Dr. Saucedo.  Patient continues with transaminitis, so psychiatric medications were held last night.  She reports she did not sleep at all, and feels like she is in opiate withdrawal.  Patient has oxycodone  written with a total of 20 mg available per 24 hours, equivalent to the morphine ER 30 mg daily she stated she was taking in the community.    I discussed holding on the patient's antidepressants with her.  She states that she feels pretty depressed, and does not feel equipped to dealing with her life at this point.  She continues to feel she needs an inpatient psychiatric hospitalization.  I told her we would restart her Ambien tonight and discussed with Dr. Saucedo.    9/28/2023: Patient reports feeling better today after getting a good night sleep last night, and getting a shower this morning.  She still depressed.  I have restarted low-dose Zoloft and Wellbutrin as we have discussed previously, since her liver transaminases are trending down.  She is denying suicidality, so agree with discontinuing sitter.    9/29/2023: Patient seen today for follow-up.  She reports that she did not sleep very well last night due to pain fullness.  She plans on spacing out her oxycodone so she is getting it before bedtime.  The Ambien did help her fall asleep.  She reports feeling very anxious today.  We discussed that this may be related to starting Wellbutrin, which is one of her goals.  She did state that low-dose Xanax helped her in the past.  I cautioned her about use of Xanax along with the other medication she is on.  She would like to try a small dose, and this may enable us to get her antidepressants to therapeutic levels more quickly.    10/2/2023: Patient seen today for follow-up.  She reports that she is getting very frustrated with being in the hospital.  We discussed that it may be a wait for her to have a bed.  Patient is still depressed, and very anxious.  She reports that if her life was messed up prior to the suicide attempt, that it is much worse now.  Her pain is also poorly controlled at this point.        Past Psychiatric History:   From prior psychiatric note found from 2008, the patient has a history of  cutting her wrist at age 17 and attempted suicide.  This note stated that she had been in therapy on and off.  She had a diagnosis of attention deficit disorder in the past and had been taking Ritalin at that time.  She is also had trials of Zoloft and Prozac.    Patient states that she had been on Zoloft and Wellbutrin until 2 months ago, when she stopped taking them abruptly.  She states that she feels like she spiraled in terms of her mental health because of that.        Substance Use and History:      I see no prescriptions for opiate analgesia, or any controlled substances aside from Ambien this year on PDMP.        Past Medical History:   PAST MEDICAL HISTORY:   Past Medical History:   Diagnosis Date    Depression     Erythromelalgia (H)        PAST SURGICAL HISTORY: No past surgical history on file.          Family History:   FAMILY HISTORY: No family history on file.    Family Psychiatric History:         Social History:   SOCIAL HISTORY:   Social History     Tobacco Use    Smoking status: Never    Smokeless tobacco: Never   Substance Use Topics    Alcohol use: Not on file       Patient recently broke up with her partner of 25 years and went back with her ex- who she  30 years ago.  Then it sounds like she broke up with the ex-, and was considering renting a room from the ex-boyfriend.  Family is not in support of this.         Physical ROS:   The 10 point Review of Systems is negative other than noted in the HPI or here.           Medications:      buPROPion  100 mg Oral Daily with breakfast    gabapentin  300 mg Oral TID    hydrOXYzine  25 mg Oral Q6H    Or    hydrOXYzine  50 mg Oral Q6H    methocarbamol  500 mg Oral TID    polyethylene glycol  17 g Oral Daily    pramox-pe-glycerin-petrolatum   Rectal BID    sertraline  25 mg Oral Daily with breakfast    sodium chloride (PF)  3 mL Intracatheter Q8H              Allergies:   No Known Allergies       Labs:     Recent Results (from the  "past 48 hour(s))   Comprehensive metabolic panel    Collection Time: 10/02/23 10:15 AM   Result Value Ref Range    Sodium 142 135 - 145 mmol/L    Potassium 4.0 3.4 - 5.3 mmol/L    Carbon Dioxide (CO2) 25 22 - 29 mmol/L    Anion Gap 10 7 - 15 mmol/L    Urea Nitrogen 10.4 8.0 - 23.0 mg/dL    Creatinine 0.68 0.51 - 0.95 mg/dL    GFR Estimate >90 >60 mL/min/1.73m2    Calcium 9.0 8.8 - 10.2 mg/dL    Chloride 107 98 - 107 mmol/L    Glucose 74 70 - 99 mg/dL    Alkaline Phosphatase 56 35 - 104 U/L    AST 59 (H) 0 - 45 U/L    ALT 88 (H) 0 - 50 U/L    Protein Total 6.4 6.4 - 8.3 g/dL    Albumin 3.6 3.5 - 5.2 g/dL    Bilirubin Total 0.2 <=1.2 mg/dL   CK total    Collection Time: 10/02/23 10:15 AM   Result Value Ref Range     (H) 26 - 192 U/L          Physical and Psychiatric Examination:     /60 (BP Location: Left arm, Patient Position: Sitting)   Pulse 76   Temp 98.6  F (37  C) (Oral)   Resp 16   Ht 1.702 m (5' 7\")   Wt 71.4 kg (157 lb 6.5 oz)   SpO2 99%   BMI 24.65 kg/m    Weight is 157 lbs 6.54 oz  Body mass index is 24.65 kg/m .    Physical Exam:  I have reviewed the physical exam as documented by by the medical team and agree with findings and assessment and have no additional findings to add at this time.    Mental Status Exam:    Appearance: awake, alert, appeared as age stated, alert, and cooperative  Attitude:  cooperative  Eye Contact:  good  Mood:  anxious and depressed  Affect:  mood congruent  Speech:  clear, coherent  Language: Fluent in english   Psychomotor Behavior:  no evidence of tardive dyskinesia, dystonia, or tics  Thought Process:  logical  Associations:  no loose associations  Thought Content:  no evidence of suicidal ideation or homicidal ideation, no evidence of psychotic thought, no auditory hallucinations present, no visual hallucinations present, and patient presented with suicide attempt via overdose, and indicates she still feels like she cannot cope with her life very well.  " She contracts for safety.  Insight:  fair  Judgement:  fair  Oriented to:  time, person, and place  Attention Span and Concentration:  intact  Recent and Remote Memory:  intact  Fund of Knowledge: Appropriate   Gait and Station:                DSM-5 Diagnosis:   Overdose on opiate analgesia, intentional, suicide attempt, subsequent encounter    Mood disorder due to a general medical condition    Chronic pain    Erythromelalgia    Rhabdomyolysis    Major depressive disorder, recurrent, severe    Anxiety              Assessment:     This is a 63-year-old female who reports depression as long as she can remember.  This undoubtedly is made worse by her chronic erythromelalgia, and the pain that goes along with this.  It also has a huge impact on her functioning and quality of life.  She abruptly stopped taking her antidepressants 2 months ago, and spiraled.  She also states that she has been taking opiate analgesia that she had been stockpiling for many years.  She had saved enough medication, that it sounds like she has been using stockpiled medications for her pain for several years.  I got confused about the timeline.  Basically, she has been taking morphine ER a total of 30 mg p.o. daily.  She does not feel like she is in opiate withdrawal now, but also took as much as 60 mg of methadone when she overdosed.    Patient is agreeable to going to an inpatient psychiatric hospitalization.  Would recommend that we hold off on Zoloft and Wellbutrin, in favor of Cymbalta which actually may give her some benefit for her pain.  Patient continues to have significant rhabdomyolysis.  Would recommend that we hold off on the trial of Cymbalta until the rhabdomyolysis has substantially cleared.    9/26/2023: Patient states she is beginning to feel better.  She would like to discuss going back on opiate analgesia.  I indicated to her that a pain consult may be helpful in helping her ascertain what is most appropriate for  her.    She is still feeling depressed.  I did talk to her about going on Cymbalta, which she stated was not helpful for her in the past.  We talked about the different options in terms of restarting medications.  Of the options, she prefers to restart both Zoloft and Wellbutrin together, because this was most helpful for her.  We discussed an initial starting dose, and I will follow along.  Have also restarted patient's Ambien.    Patient is having increased anxiety.  It does not sound like this is opiate withdrawal related.  She did have pain that kept her up last night, and she is going to refine how she takes her as needed oxycodone.    She may be having some activation related to restarting her antidepressants.  We will write for very low-dose Xanax as needed.  Cautioned her about potential side effects, particularly when using some of the other medication she is on, particularly opiate analgesia.  Told her to be careful about ambulating.    She is still very depressed.  Will increase Zoloft.  I am concerned that the Wellbutrin may be impacting her sleep.          Summary of Recommendations:   1.  We will discontinue hold.  Patient is willing to go to inpatient psychiatry voluntarily.  If she changes her mind, place her back on a hold please, and we will petition for commitment.    2.  Agree with discontinuing sitter.  Patient is denying current suicidal ideation and contracted for safety with me.    3.  Have restarted Ambien 10 mg p.o. nightly as needed for insomnia.  This reflects the patient's home dose    4.  Have increase Zoloft to 50 mg p.o. every morning.    5.  Have written for low-dose Xanax 0.25 mg p.o. twice daily as needed to help with anxiety and activation since we have restarted her antidepressants    6.  We are awaiting an inpatient psychiatric bed.    Karley Piña MD  Consult/Liaison Psychiatry and Addiction Medicine  [facility]

## 2023-10-03 NOTE — PLAN OF CARE
Goal Outcome Evaluation:         Summary: Intentional drug overdose/ suicide attempt   DATE & TIME: 10/2/23-10/3/23 [id number]  Cognitive Concerns/ Orientation : A&Ox4; calm, cooperative, pleasant; denies suicidal ideations. Calls as needed.   BEHAVIOR & AGGRESSION TOOL COLOR: Green  CIWA SCORE: NA   ABNL VS/O2: VSS, on RA   MOBILITY: Independent, steady. Showered w/o assist. Walking halls.   PAIN MANAGMENT: Bilateral feet pain from swelling. 7/10. PRN Toradol IM given x1 this shift.    DIET: Regular , good apetite  BOWEL/BLADDER: Continent of B&B. Using bathroom. Pt with c/o of some hemorrhoids.  ABNL LAB/BG: CK total 649, ALT 88, AST 59  DRAIN/DEVICES: No PIV access, MD aware.   TELEMETRY RHYTHM: NA  SKIN: Dry, flaky, scattered bruising, redness /swelling on feet; chronic condition per patient, +3 edema on the RLE. Right arm partially paralyzed.   TESTS/PROCEDURES: s/p US LE, negative for DVT, noted enlarged lymph in the Rt groin  D/C DATE: Pending-inpatient psych when bed available. Psych saw pt at bedside this shift.  OTHER IMPORTANT INFO: Pain Team, Psych and GI following. PT/OT following. Pt is no longer on suicide precautions. Calling as needed. Rounded on freq. Asking for few interruptions during night to promote sleep.

## 2023-10-03 NOTE — PLAN OF CARE
Summary: Intentional drug overdose/ suicide attempt   DATE & TIME: 10/2/23 Evenings   Cognitive Concerns/ Orientation : A&Ox4; calm, cooperative, pleasant; denies suicidal ideations. Calls as needed.   BEHAVIOR & AGGRESSION TOOL COLOR: Green  CIWA SCORE: NA   ABNL VS/O2: VSS, on RA   MOBILITY: Independent, steady. Showered w/o assist. Walking halls.   PAIN MANAGMENT: Bilateral feet pain from swelling. 4-6/10. PRN Oxy 5mg and Toradol IM given x1 this shift.    DIET: Regular , good apetite  BOWEL/BLADDER: Continent of B&B. Using bathroom. Pt with c/o of some hemorrhoids.  ABNL LAB/BG: CK total 649, ALT 88, AST 59  DRAIN/DEVICES: No PIV access, MD aware.   TELEMETRY RHYTHM: NA  SKIN: Dry, flaky, scattered bruising, redness /swelling on feet; chronic condition per patient, +3 edema on the RLE. Right arm partially paralyzed.   TESTS/PROCEDURES: s/p US LE, negative for DVT, noted enlarged lymph in the Rt groin  D/C DATE: Pending-inpatient psych when bed available. Psych saw pt at bedside this shift.  OTHER IMPORTANT INFO: Pain Team, Psych and GI following. PT/OT following. Pt is no longer on suicide precautions. Calling as needed. Rounded on freq. Asking for few interruptions if possible to promote sleep this evening. VS taken late this shift.

## 2023-10-03 NOTE — TELEPHONE ENCOUNTER
No appropriate beds are currently available within the  system. Bed search update ([facility]) @ 2:10AM:       [facility]: @ cap per website. Per call @ 02:10 they are full   [facility]: @ cap per website  [facility]: @ cap per website. Per [place] @ 02:11, they have 1 female on step-down but cannot complete review process until after 8AM - Pt is being reviewed for this bed  [facility]: @ cap per website  [facility]: @ cap per website  [facility]: Posting 1 bed (Ages 18-26. No recent aggressions, violence or sexual assault. Neg covid required).    [facility]: @ cap per website  [facility]: @ cap per website  [facility]: @ cap per website  [facility]: @ cap per website    Pt remains on work list until appropriate placement is available and awaiting callback from [facility] after 8AM

## 2023-10-03 NOTE — PROGRESS NOTES
"SPIRITUAL HEALTH SERVICES - Progress Note  [facility]    Saw pt Gabriela Gray per introduction to [facility].    Patient/Family Understanding of Illness and Goals of Care -   Gabriela shared she was in the hospital because of \"an overdose on on opioids\".  Pt commented that the plan is for her to move to a mental health unit once a bed is available.    Distress and Loss -   Gabriela named, \"I feel like a burden\", in reference to living with her son and her children needing to care for her as she gets older.  Pt described caring for her parents at the end of their lives and feeling a sense of hardship towards them.  Gabriela expressed shame around her overdose and the impact that has had on her children.  Pt talked about how her feet swelling has prevented her from gardening, which is something she really enjoyed doing.    Strengths, Coping, and Resources -   Pt referenced her caring for her grandson for 12 years of his life and \"feeling like I was useful\".  Gabriela spoke of her connection to nature and how gardening has been a source of fish for her in her life.    Meaning, Beliefs, and Spirituality - Gabriela said, \"I admire people who have that (sandeep) in their lives but that's never been me\".    Plan of Care - Pt was open to a visit tomorrow. Will plan to follow up with patient.      Lorena Reddy  Chaplain Resident    [facility] routine referrals *[phone]  [facility] available 24/7 for emergent requests/referrals, either by paging the on-call  or by entering an ASAP/STAT consult in Epic (this will also page the on-call ).     "

## 2023-10-03 NOTE — TELEPHONE ENCOUNTER
9:13 AM Per call to NM/Tayler CRN is unavailable will have Charge Nurse follow up regarding pt upon return. Awaiting call back.  12:55 PM NM Charge called back NM declining d/t acuity and no appropriate bed.  1:22 PM Per call to Lissett willing to review for admission at [facility] to fax [phone] clinical.  2:09 PM Fax Sent.    R: [place] [facility] Inpatient Bed Call Log 10/3/23 7:10 AM    Intake has called facilities that have not updated the bed status within the last 12 hours. ([facility])                    [facility] is at capacity.               [facility] is posting 0 beds. [phone]. 10/3 Per call @ 7:20 am SHAE Liang currently at capacity.   [facility] is posting 0 beds. Negative covid required.                  [facility] is posting 0 bed. Negative covid required. [phone] 10/3 Per call @7:25 am to Tayler depending on the acuity to call back shortly for availability.   [facility] is posting 0 beds.         [facility] is posting 0 beds. [phone].     [facility] is posting 1 bed Call for details. Negative covid.  [phone]. 10/3 Per call @7:26 am to Marni no YA beds available.   [facility] is posting 0 beds            [facility] is posting 0 beds. 10/3 er call @ 1:22 PM      Patient remains on the work list pending appropriate bed availability

## 2023-10-03 NOTE — PROGRESS NOTES
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 She is only prescribed ambien PTA. The patient's son found her on the ground, face down covered in vomit. He found a suicide note. She reported to the ED provider that she suffers from chronic pain and wanted to end her life because she cannot deal with the pain anymore. EMS administered narcan and the patient became more alert.   * U tox positive for benzos, opiates, and cannabinoids   * 9/25 Mental status now at baseline.  * 9/26 pt starting to endorse some sweats, no fever. Perhaps starting to experience some opioid withdrawal   - Admitted to inpatient  - Suicide precautions, bedside attendant   - Psychiatry consultation appreciated, will need inpatient Psychiatry placement when medically stable   -pain medicine consult placed on 9/27 appreciate etheir help   Pts Wellbutrin SR   at 100mg Daily and Zoloft 25mg daily on hold due to elevted LFTS with Rhabdomyolysis   Ambien restarted at 10mg nightly PRN as its her chronic med   Pain medicine started her on Gbapentin 300mg PO TID , Robaxin 500mg TID ,  Hydroxyzine PRN and Oxycodone 5mg q 6hours pRN   Pain well controlled currently     Patient's rhabdomyolysis much improved currently and she is medically ready for discharge to inpatient psych unit when bed is available  Discussed with charge RN today    Rhabdomyolysis   Transaminitis 2/2 above   R upper extremity, R lower extremity weakness likely 2/2 above vs peripheral nerve palsy from prolonged down time.   CK is elevated to >22,00 since 9/24 on admission. Suspect severe rhabdo from prolonged  down-time. Orthopedics consulted. No clinical indication of compartment syndrome. Transaminases are also markedly elevated likely 2/2 rhabdo.   * Pt had been endorsing RUE and RLE weakness. MRI obtained and ruled out stroke, suspect weakness is 2/2 rhabdo  * 9/26 CK is now starting to improve [id number] .  LFTs are improving.   * Renal function is stable.   - Intake/output   - Daily CK and LFTs   - PT/OT   Stop IVF today and monitor CPK with time it should continue to improve at this point   Pt is eating,  drinking well currently   CPK much improved to 3K-1900   Repeat U/S RLE with doppler to r/o DVT.  We will recheck CPK and CMP in 1 week  Patient is ready for discharge to inpatient psych unit    Elevated blood pressure without underlying diagnosis of HTN  - As needed hydralazine 5 mg IV for systolic blood pressure over 170 every 6 hours as needed.  BP currently in the 120s      Oropharynx laceration inthe back of her throat  - Unknown etiology  - Continue to follow-up.     Depression/anxiety/insomnia  - Psychiatry consulted as noted above  - Sertraline and wellbutrin resumed     History of HLD  Vitamin D deficiency  Osteoporosis  -No treatment found.  -Patient can follow-up with her primary doctor, after discharge.       Diet: Diet  Combination Diet Regular Diet Adult    DVT Prophylaxis: Pneumatic Compression Devices  Bragg Catheter: Not present  Lines: None     Cardiac Monitoring: None  Code Status: Full Code      Clinically Significant Risk Factors              # Hypoalbuminemia: Lowest albumin = 3.1 g/dL at 9/28/2023  8:33 AM, will monitor as appropriate                       Disposition Plan      Expected Discharge Date: 10/05/2023,  3:00 PM                Karen Saucedo MD  Hospitalist Service  [facility]  Securely message with [employer] (more info)  Text page via [employer] Paging/Directory   ______________________________________________________________________    Interval History     No  fevers/chills/chest pain/SOB.    Physical Exam   Vital Signs: Temp: 97.8  F (36.6  C) Temp src: Oral BP: 115/67 Pulse: 71   Resp: 16 SpO2: 100 % O2 Device: None (Room air)    Weight: 157 lbs 6.54 oz    Constitutional: Awake, alert, cooperative, no apparent distress.  Eyes: Conjunctiva and pupils examined and normal.  HEENT: Moist mucous membranes, normal dentition.  Respiratory: Non-labored breathing   Cardiovascular: Regular rate and rhythm  Skin: No rashes, no cyanosis, RLE 1+ edema   Musculoskeletal: No joint swelling, erythema or tenderness. Soft compartments   Neurologic: Cranial nerves 2-12 intact, normal strength and sensation.  Psychiatric: Alert, oriented to person, place and time, no obvious anxiety or depression.    Medical Decision Making       60 MINUTES SPENT BY ME on the date of service doing chart review, history, exam, documentation & further activities per the note.      Data         Imaging results reviewed over the past 24 hrs:   Recent Results (from the past 24 hour(s))   US Lower Extremity Venous Duplex Right    Narrative    VENOUS ULTRASOUND RIGHT LOWER EXTREMITY  10/2/2023 12:25 PM     HISTORY: 63-year-old patient with swelling and thigh pain, request  made for evaluation of DVT    COMPARISON: September 25, 2023    TECHNIQUE: Color Doppler and spectral waveform analysis performed  throughout the deep veins of the right lower extremity.    FINDINGS: The right common femoral, proximal greater saphenous,  femoral, and popliteal veins demonstrate normal blood flow,  compression, and augmentation. Posterior tibial and peroneal veins are  compressible. Enlarged lymph node in the right groin is 2.1 x 2.7 x  0.6 cm. Fatty hilum is noted. Contralateral left common femoral vein  is patent.      Impression    IMPRESSION:  1. Negative for DVT in the right lower extremity.  2. Enlarged lymph node in the right groin, nonspecific, though may be  reactive.    DEREK RIVERA MD         SYSTEM ID:  [id number]

## 2023-10-03 NOTE — PLAN OF CARE
Goal Outcome Evaluation:       Summary: Intentional drug overdose/ suicide attempt   DATE & TIME: 10/3/23 3910-4323  Cognitive Concerns/ Orientation : A&Ox4; calm, cooperative, pleasant; denies suicidal ideations. Calls as needed.   BEHAVIOR & AGGRESSION TOOL COLOR: Green  CIWA SCORE: NA   ABNL VS/O2: VSS, on RA   MOBILITY: Independent, steady. Walking halls.   PAIN MANAGMENT: Bilateral feet pain from swelling. 7/10. PRN Toradol IM given x1 this shift, PRN Oxy x1, PRN Xanax given x1 for anxiety, on scheduled pain medications also   DIET: Regular , good apetite  BOWEL/BLADDER: Continent of B&B. Using bathroom. Pt with c/o of some hemorrhoids, preparation H twice day  ABNL LAB/BG: K 4.0  DRAIN/DEVICES: No PIV access, MD aware.   TELEMETRY RHYTHM: NA  SKIN: Dry, flaky, scattered bruising, redness /swelling on feet; chronic condition per patient, +3 edema on the RLE. Right arm partially paralyzed.   TESTS/PROCEDURES: s/p US LE, negative for DVT, noted enlarged lymph in the Rt groin  D/C DATE: Pending-inpatient psych when bed available. Psych saw pt at bedside this shift.  OTHER IMPORTANT INFO: Pain Team, Psych and GI following. PT/OT following. Pt is no longer on suicide precautions.

## 2023-10-03 NOTE — PROGRESS NOTES
Initial Psychiatric Consult   Consult date: September 25, 2023         Reason for Consult, requesting source:      intentional drug overdose     Requesting source: Wen Parker    Labs and imaging reviewed. Patient seen and evaluated by Karley Piña MD          HPI:     This is a 63-year-old female admitted on 9/23/2023 in the setting of overdose on number 9 tablets of morphine ER 60 mg, number 5 tablets of OxyContin 30 mg and an unknown number of Ambien.  Patient's son found her down, as she had been staying with him.  She was covered in her own vomit, which is described as coffee-ground like emesis.  EMS were activated, and patient apparently revived once Narcan was given at the scene.  Patient has chronic pain.   She apparently had a CK greater than 22,000.  CT head was negative.  Patient had a transaminitis, with AST climbing to almost 1100.  Acetaminophen level was negative.    Patient seen today after reviewing the chart.  We had a lengthy conversation.  She is very open about the fact that she overdosed on her opiates.  She actually has not been prescribed opiates for many years, but had so many opiates stockpiled, that she has been taking them consistently for years now.  Most recently, she has been taking morphine ER a total of 30 mg/day.  She had not been taking OxyContin, she only did this when she OD'd.  It was difficult for me to tell whether she has been taking methadone consistently, or just when she OD'd.  She states that she took 60 mg total of methadone when she OD'd.    Patient agrees that she needs inpatient psychiatric hospitalization.  She did asked to have her Ambien back, because she has been on this for years    9/27/2023: Patient seen today for follow-up.  Discussed with Dr. Saucedo.  Patient continues with transaminitis, so psychiatric medications were held last night.  She reports she did not sleep at all, and feels like she is in opiate withdrawal.  Patient has oxycodone  written with a total of 20 mg available per 24 hours, equivalent to the morphine ER 30 mg daily she stated she was taking in the community.    I discussed holding on the patient's antidepressants with her.  She states that she feels pretty depressed, and does not feel equipped to dealing with her life at this point.  She continues to feel she needs an inpatient psychiatric hospitalization.  I told her we would restart her Ambien tonight and discussed with Dr. Saucedo.    9/28/2023: Patient reports feeling better today after getting a good night sleep last night, and getting a shower this morning.  She still depressed.  I have restarted low-dose Zoloft and Wellbutrin as we have discussed previously, since her liver transaminases are trending down.  She is denying suicidality, so agree with discontinuing sitter.    9/29/2023: Patient seen today for follow-up.  She reports that she did not sleep very well last night due to pain fullness.  She plans on spacing out her oxycodone so she is getting it before bedtime.  The Ambien did help her fall asleep.  She reports feeling very anxious today.  We discussed that this may be related to starting Wellbutrin, which is one of her goals.  She did state that low-dose Xanax helped her in the past.  I cautioned her about use of Xanax along with the other medication she is on.  She would like to try a small dose, and this may enable us to get her antidepressants to therapeutic levels more quickly.    10/2/2023: Patient seen today for follow-up.  She reports that she is getting very frustrated with being in the hospital.  We discussed that it may be a wait for her to have a bed.  Patient is still depressed, and very anxious.  She reports that if her life was messed up prior to the suicide attempt, that it is much worse now.  Her pain is also poorly controlled at this point.    10/3/2023: Patient seen today for follow-up.  She slept poorly last night, and states that Ambien only helps  her sleep for about 4 hours.  Her pain in her feet is very uncomfortable.  She wondered about trying buprenorphine.  I indicated that I will see if we can get the pain team to come back to see her.  We discussed different options aside from waiting for an inpatient psychiatric bed such as IOP, PHP.  Patient is open to the thought of this.  She is denying any side effects to the Zoloft increase yesterday.      Past Psychiatric History:   From prior psychiatric note found from 2008, the patient has a history of cutting her wrist at age 17 and attempted suicide.  This note stated that she had been in therapy on and off.  She had a diagnosis of attention deficit disorder in the past and had been taking Ritalin at that time.  She is also had trials of Zoloft and Prozac.    Patient states that she had been on Zoloft and Wellbutrin until 2 months ago, when she stopped taking them abruptly.  She states that she feels like she spiraled in terms of her mental health because of that.        Substance Use and History:      I see no prescriptions for opiate analgesia, or any controlled substances aside from Ambien this year on PDMP.        Past Medical History:   PAST MEDICAL HISTORY:   Past Medical History:   Diagnosis Date    Depression     Erythromelalgia (H)        PAST SURGICAL HISTORY: No past surgical history on file.          Family History:   FAMILY HISTORY: No family history on file.    Family Psychiatric History:         Social History:   SOCIAL HISTORY:   Social History     Tobacco Use    Smoking status: Never    Smokeless tobacco: Never   Substance Use Topics    Alcohol use: Not on file       Patient recently broke up with her partner of 25 years and went back with her ex- who she  30 years ago.  Then it sounds like she broke up with the ex-, and was considering renting a room from the ex-boyfriend.  Family is not in support of this.         Physical ROS:   The 10 point Review of Systems is  "negative other than noted in the HPI or here.           Medications:      buPROPion  100 mg Oral Daily with breakfast    gabapentin  300 mg Oral TID    hydrOXYzine  25 mg Oral Q6H    Or    hydrOXYzine  50 mg Oral Q6H    methocarbamol  500 mg Oral TID    polyethylene glycol  17 g Oral Daily    pramox-pe-glycerin-petrolatum   Rectal BID    sertraline  50 mg Oral Daily with breakfast    sodium chloride (PF)  3 mL Intracatheter Q8H              Allergies:   No Known Allergies       Labs:     Recent Results (from the past 48 hour(s))   Comprehensive metabolic panel    Collection Time: 10/02/23 10:15 AM   Result Value Ref Range    Sodium 142 135 - 145 mmol/L    Potassium 4.0 3.4 - 5.3 mmol/L    Carbon Dioxide (CO2) 25 22 - 29 mmol/L    Anion Gap 10 7 - 15 mmol/L    Urea Nitrogen 10.4 8.0 - 23.0 mg/dL    Creatinine 0.68 0.51 - 0.95 mg/dL    GFR Estimate >90 >60 mL/min/1.73m2    Calcium 9.0 8.8 - 10.2 mg/dL    Chloride 107 98 - 107 mmol/L    Glucose 74 70 - 99 mg/dL    Alkaline Phosphatase 56 35 - 104 U/L    AST 59 (H) 0 - 45 U/L    ALT 88 (H) 0 - 50 U/L    Protein Total 6.4 6.4 - 8.3 g/dL    Albumin 3.6 3.5 - 5.2 g/dL    Bilirubin Total 0.2 <=1.2 mg/dL   CK total    Collection Time: 10/02/23 10:15 AM   Result Value Ref Range     (H) 26 - 192 U/L   Potassium    Collection Time: 10/03/23 11:28 AM   Result Value Ref Range    Potassium 4.0 3.4 - 5.3 mmol/L          Physical and Psychiatric Examination:     /67 (BP Location: Left arm, Patient Position: Sitting, Cuff Size: Adult Regular)   Pulse 71   Temp 97.8  F (36.6  C) (Oral)   Resp 16   Ht 1.702 m (5' 7\")   Wt 71.4 kg (157 lb 6.5 oz)   SpO2 100%   BMI 24.65 kg/m    Weight is 157 lbs 6.54 oz  Body mass index is 24.65 kg/m .    Physical Exam:  I have reviewed the physical exam as documented by by the medical team and agree with findings and assessment and have no additional findings to add at this time.    Mental Status Exam:    Appearance: awake, alert, " appeared as age stated, alert, and cooperative  Attitude:  cooperative  Eye Contact:  good  Mood:  anxious and depressed  Affect:  mood congruent  Speech:  clear, coherent  Language: Fluent in english   Psychomotor Behavior:  no evidence of tardive dyskinesia, dystonia, or tics  Thought Process:  logical  Associations:  no loose associations  Thought Content:  no evidence of suicidal ideation or homicidal ideation, no evidence of psychotic thought, no auditory hallucinations present, no visual hallucinations present, and patient presented with suicide attempt via overdose, and indicates she still feels like she cannot cope with her life very well.  She contracts for safety.  Insight:  fair  Judgement:  fair  Oriented to:  time, person, and place  Attention Span and Concentration:  intact  Recent and Remote Memory:  intact  Fund of Knowledge: Appropriate   Gait and Station:                DSM-5 Diagnosis:   Overdose on opiate analgesia, intentional, suicide attempt, subsequent encounter    Mood disorder due to a general medical condition    Chronic pain    Erythromelalgia    Rhabdomyolysis    Major depressive disorder, recurrent, severe    Anxiety              Assessment:     This is a 63-year-old female who reports depression as long as she can remember.  This undoubtedly is made worse by her chronic erythromelalgia, and the pain that goes along with this.  It also has a huge impact on her functioning and quality of life.  She abruptly stopped taking her antidepressants 2 months ago, and spiraled.  She also states that she has been taking opiate analgesia that she had been stockpiling for many years.  She had saved enough medication, that it sounds like she has been using stockpiled medications for her pain for several years.  I got confused about the timeline.  Basically, she has been taking morphine ER a total of 30 mg p.o. daily.  She does not feel like she is in opiate withdrawal now, but also took as much as 60  mg of methadone when she overdosed.    Patient is agreeable to going to an inpatient psychiatric hospitalization.  Would recommend that we hold off on Zoloft and Wellbutrin, in favor of Cymbalta which actually may give her some benefit for her pain.  Patient continues to have significant rhabdomyolysis.  Would recommend that we hold off on the trial of Cymbalta until the rhabdomyolysis has substantially cleared.    9/26/2023: Patient states she is beginning to feel better.  She would like to discuss going back on opiate analgesia.  I indicated to her that a pain consult may be helpful in helping her ascertain what is most appropriate for her.    She is still feeling depressed.  I did talk to her about going on Cymbalta, which she stated was not helpful for her in the past.  We talked about the different options in terms of restarting medications.  Of the options, she prefers to restart both Zoloft and Wellbutrin together, because this was most helpful for her.  We discussed an initial starting dose, and I will follow along.  Have also restarted patient's Ambien.    Patient is having increased anxiety.  It does not sound like this is opiate withdrawal related.  She did have pain that kept her up last night, and she is going to refine how she takes her as needed oxycodone.    She may be having some activation related to restarting her antidepressants.  We will write for very low-dose Xanax as needed.  Cautioned her about potential side effects, particularly when using some of the other medication she is on, particularly opiate analgesia.  Told her to be careful about ambulating.    She is still very depressed.  No side effects to Zoloft increased yesterday.  Patient is wondering about buprenorphine for her pain.  We will reach out to pain team.          Summary of Recommendations:   1.  We will discontinue hold.  Patient is willing to go to inpatient psychiatry voluntarily.  If she changes her mind, place her back on  a hold please, and we will petition for commitment.    2.  Agree with discontinuing sitter.  Patient is denying current suicidal ideation and contracted for safety with me.    3.  Have restarted Ambien 10 mg p.o. nightly as needed for insomnia.  This reflects the patient's home dose    4.  Have increased Zoloft to 50 mg p.o. every morning.    5.  Have written for low-dose Xanax 0.25 mg p.o. twice daily as needed to help with anxiety and activation since we have restarted her antidepressants    6.  We are awaiting an inpatient psychiatric bed, however patient has considering other options such as intensive outpatient, or partial hospital program.  ELSY P is assisting in helping the patient get an intake assessment for appropriate disposition.    7.  We will reach out to pain team.  Patient's pain is central to her depression and anxiety.  Patient is interested in a possible trial of buprenorphine.    Karley Piña MD  Consult/Liaison Psychiatry and Addiction Medicine  [facility]

## 2023-10-03 NOTE — TELEPHONE ENCOUNTER
R: 9:35PM [facility] currently reviewing pt for placement.  Information faxed.  Awaiting Update.    R: [place]  Access Inpatient Bed Call Log 10/2/23 @9:00 PM [facility] Only  Intake has called facilities that have not updated the bed status within the last 12 hours.                                 [facility] is at capacity.               [facility] is posting 0 beds. [phone].  Per Debbie 8:00PM at [facility] is posting 0 beds. Negative covid required.  Per Cory 8:05PM at [facility] is posting 0 bed. Negative covid required. [phone]. Per call  to Sami at 8:10PM, possible very low acuity bed available.  [facility] is posting 0 beds.   Per Cory 8:05PM at [facility] is posting 0 beds. [phone].  Per Alexi at 8:12PM , no beds available.    [facility] is posting 0 beds  Per Cory 8:05PM at [facility] is posting 0 beds.  Per Cory 8:05PM at [facility] is posting 0 beds. Low acuity only.   Per Cory 8:05PM at [facility]                 Pt remains on PPS worklist awaiting appropriate placement

## 2023-10-03 NOTE — DISCHARGE SUMMARY
[facility]  Hospitalist Discharge Summary      Date of Admission:  9/23/2023  Date of Discharge:  10/3/23  Discharging Provider: Karen Saucedo MD  Discharge Service: Hospitalist Service    Discharge Diagnoses   Please see hospital course    Clinically Significant Risk Factors          Follow-ups Needed After Discharge   Follow-up Appointments     Follow Up and recommended labs and tests      F/u with Psychiatry daily  Recheck CBC, CMP, CPK in 1week     Needs out pt follow up with Pain clinic for chronic pain in both feet on discharge       Unresulted Labs Ordered in the Past 30 Days of this Admission       No orders found from 8/24/2023 to 9/24/2023.            Discharge Disposition   Discharged to inpatient Psychiatry at [facility]   Condition at discharge: Stable    Hospital Course   Intentional prescription drug overdose  Opioid overdose   Suicide attempt    Chronic pain secondary to ERTHROMELAlgia of both feet   Pt presents to the ED on 9/23 with intentional overdose on multiple prescription drugs (varying reports including Ambien, morphine, methadone, oxycontin). She is only prescribed ambien PTA. The patient's son found her on the ground, face down covered in vomit. He found a suicide note. She reported to the ED provider that she suffers from chronic pain and wanted to end her life because she cannot deal with the pain anymore. EMS administered narcan and the patient became more alert.   * U tox positive for benzos, opiates, and cannabinoids   * 9/25 Mental status now at baseline.  * 9/26 pt starting to endorse some sweats, no fever. Perhaps starting to experience some opioid withdrawal   - Admitted to inpatient  - Suicide precautions, bedside attendant   - Psychiatry consultation appreciated, will need inpatient Psychiatry placement when medically stable   -pain medicine consult placed on 9/27 appreciate etheir help   Pts Wellbutrin SR   at 100mg Daily and Zoloft 25mg daily on  hold due to elevted LFTS with Rhabdomyolysis   Ambien restarted at 10mg nightly PRN as its her chronic med   Pain medicine started her on Gbapentin 300mg PO TID , Robaxin 500mg TID ,  Hydroxyzine PRN and Oxycodone 5mg q 6hours pRN   Pain well controlled currently     Patient's rhabdomyolysis much improved currently and she is medically ready for discharge to inpatient psych unit when bed is available  Discussed with charge RN today    Rhabdomyolysis   Transaminitis 2/2 above   R upper extremity, R lower extremity weakness likely 2/2 above vs peripheral nerve palsy from prolonged down time.   CK is elevated to >22,00 since 9/24 on admission. Suspect severe rhabdo from prolonged down-time. Orthopedics consulted. No clinical indication of compartment syndrome. Transaminases are also markedly elevated likely 2/2 rhabdo.   * Pt had been endorsing RUE and RLE weakness. MRI obtained and ruled out stroke, suspect weakness is 2/2 rhabdo  * 9/26 CK is now starting to improve [id number] .  LFTs are improving.   * Renal function is stable.   - Intake/output   - Daily CK and LFTs   - PT/OT   Stop IVF on 9/29/23 and monitor CPK with time it should continue to improve at this point   Pt is eating,  drinking well currently   CPK much improved to 3K-1900   Repeat U/S RLE with doppler to r/o DVT.  We will recheck CPK and CMP in 1 week  Patient is ready for discharge to inpatient psych unit    Elevated blood pressure without underlying diagnosis of HTN  - As needed hydralazine 5 mg IV for systolic blood pressure over 170 every 6 hours as needed.  BP currently in the 120s      Oropharynx laceration inthe back of her throat  - Unknown etiology  - Continue to follow-up.     Depression/anxiety/insomnia  - Psychiatry consulted as noted above  - Sertraline and wellbutrin resumed     History of HLD  Vitamin D deficiency  Osteoporosis  -No treatment found.  -Patient can follow-up with her primary doctor, after discharge.    Consultations This  Hospital Stay   DIAGNOSTIC EVALUATION CENTER (DEC) ASSESSMENT ORDER  CARE MANAGEMENT / SOCIAL WORK IP CONSULT  PHYSICAL THERAPY ADULT IP CONSULT  OCCUPATIONAL THERAPY ADULT IP CONSULT  CHEMICAL DEPENDENCY IP CONSULT  WOUND OSTOMY CONTINENCE NURSE  IP CONSULT  WOUND OSTOMY CONTINENCE NURSE  IP CONSULT  WOUND OSTOMY CONTINENCE NURSE  IP CONSULT  ORTHOPEDIC SURGERY IP CONSULT  GASTROENTEROLOGY IP CONSULT  PSYCHIATRY IP CONSULT  CARE MANAGEMENT / SOCIAL WORK IP CONSULT  PAIN MANAGEMENT ADULT IP CONSULT  PHYSICAL THERAPY ADULT IP CONSULT  PSYCHIATRY IP CONSULT    Code Status   Full Code    Time Spent on this Encounter   I, Karen Saucedo MD, personally saw the patient today and spent greater than 30 minutes discharging this patient.       Karen Saucedo MD  [place] [address] [facility]  [address] [place] [address]  Phone: [phone]  ______________________________________________________________________    Physical Exam   Vital Signs: Temp: 98.5  F (36.9  C) Temp src: Oral BP: 119/66 Pulse: 75   Resp: 16 SpO2: 100 % O2 Device: None (Room air)    Weight: 157 lbs 6.54 oz  General Appearance: Alert, awake, NAD   Respiratory: CTA b/l  Cardiovascular: RRR  GI: soft, NT, ND, BS+  Skin: warm and dry   Other:         Primary Care Physician   Physician No Ref-Primary    Discharge Orders      Reason for your hospital stay    Depression and drug overdose, Rhabdomyolysis     Activity - Up ad paola     Follow Up and recommended labs and tests    F/u with Psychiatry daily  Recheck CBC, CMP, CPK in 1week     Full Code     Diet    Follow this diet upon discharge: Orders Placed This Encounter      Snacks/Supplements Pediatric: Ensure Enlive; With Meals      Combination Diet Regular Diet Adult; Safe Tray - with utensils       Significant Results and Procedures   Most Recent 3 CBC's:  Recent Labs   Lab Test 09/26/23  1100 09/24/23  0943 09/23/23  1009   WBC 5.6 11.9* 15.4*   HGB 10.7* 10.9* 13.7   MCV 90  91 92    169 275     Most Recent 3 BMP's:  Recent Labs   Lab Test 10/03/23  1128 10/02/23  1015 09/30/23  0903 09/29/23  0846   NA  --  142 139 143   POTASSIUM 4.0 4.0 3.8 3.5   CHLORIDE  --  107 105 109*   CO2  --  25 25 24   BUN  --  10.4 7.4* 6.9*   CR  --  0.68 0.59 0.59   ANIONGAP  --  10 9 10   BRADY  --  9.0 8.7* 8.8   GLC  --  74 104* 88     Most Recent 2 LFT's:  Recent Labs   Lab Test 10/02/23  1015 09/30/23  0903   AST 59* 133*   ALT 88* 122*   ALKPHOS 56 54   BILITOTAL 0.2 0.4   ,   Results for orders placed or performed during the hospital encounter of 09/23/23   Chest XR,  PA & LAT    Narrative    EXAM: XR CHEST 2 VIEWS  LOCATION: [facility]  DATE/TIME: 9/23/2023 10:37 AM CDT    INDICATION: overdose, vomiting  COMPARISON: None.      Impression    IMPRESSION:     The cardiac silhouette is normal in size. Hilar and mediastinal interfaces are normal.    The lungs are symmetrically inflated. There are no airspace or interstitial opacities.    Diaphragm curvature is normal. No pleural fluid is present.    Small thoracic spine degenerative osteophytes are present.      Head CT w/o contrast    Narrative    EXAM: CT HEAD WITHOUT CONTRAST  LOCATION: [facility]  DATE: 09/23/2023    INDICATION: Overdose, found down.  COMPARISON: None.  TECHNIQUE: Routine CT Head without IV contrast. Multiplanar reformats. Dose reduction techniques were used.    FINDINGS:  INTRACRANIAL CONTENTS: No finding for intracranial hemorrhage, mass, or acute infarct. Ventricles are normal in size. Normal gray-white matter differentiation. No mass effect or midline shift.    Tips of the cerebellar tonsils are excluded from the volume imaged. Sella is unremarkable for technique. Corpus callosum is normally formed.    VISUALIZED ORBITS/SINUSES/MASTOIDS: Prior cataract surgeries. Mild right anterior ethmoid sinus mucosal thickening. Middle ear cavities and mastoid air cells are  clear.    BONES/SOFT TISSUES: Calvarium is intact, without suspicious lytic or blastic foci.      Impression    IMPRESSION:  1.  No finding for intracranial hemorrhage, mass, or acute infarct.       XR Pelvis 1/2 Views    Narrative    EXAM: XR PELVIS 1/2 VIEWS  LOCATION: [facility]  DATE: 9/24/2023    INDICATION: Found down, pressure sore R hip, RLE difficult to adduct, longer compared to LLE  COMPARISON: None.      Impression    IMPRESSION: Normal joint spaces and alignment. No fracture.   XR Shoulder Right 2 Views    Narrative    EXAM: XR SHOULDER RIGHT 2 VIEWS  LOCATION: [facility]  DATE: 9/24/2023    INDICATION: Found down, pressure sore R shoulder, R shoulder pain and swelling, decreased ROM  COMPARISON: None.      Impression    IMPRESSION: Hypertrophic arthritic change of the acromioclavicular joint. Question a subtle irregularity at the base of the coronoid process of the scapula on the external rotated view, indeterminate for nondisplaced fracture. Consider CT for definitive   characterization. No other evidence for acute fracture. The glenohumeral joint is intact.   XR Lumbar Spine 2/3 Views    Narrative    EXAM: XR LUMBAR SPINE 2/3 VIEWS  LOCATION: [facility]  DATE: 9/24/2023    INDICATION: Found down, acute R foot drop  COMPARISON: None.      Impression    IMPRESSION: Partial sacralization of the L5 vertebral body. Right apex curvature measures approximately 20 degrees. There is approximately 5 mm right lateral listhesis of L3 on L4. Mild grade 1 anterolisthesis of L3 on L4. Vertebral body heights are   grossly preserved. Multilevel degenerative disc height loss and facet arthropathy. The bones appear diffusely demineralized which may reflect osteoporosis. The sacroiliac joints and soft tissues are unremarkable.   MR Brain w/o & w Contrast    Narrative    EXAM: MR BRAIN W/O and W CONTRAST  LOCATION: [facility]  DATE/TIME: 9/25/2023 6:28 PM CDT    INDICATION: R sided weakness with noted RUE ataxia  COMPARISON: CT head 09/23/2023.  CONTRAST: 7mL Gadavist  TECHNIQUE: Routine multiplanar multisequence head MRI without and with intravenous contrast.    FINDINGS:    INTRACRANIAL CONTENTS: No diffusion restriction to suggest acute/subacute ischemia. No mass effect, acute hemorrhage, or extra-axial collection. Scant scattered nonspecific T2/FLAIR hyperintensities within the cerebral white matter most consistent with   chronic microvascular ischemic change. Normal ventricles and sulci. Normal position of the cerebellar tonsils. No pathologic contrast enhancement.    SELLA: No abnormality accounting for technique.    OSSEOUS STRUCTURES/SOFT TISSUES: Normal marrow signal. The major intracranial vascular flow voids are maintained.    ORBITS: No visible intraorbital abnormality.     SINUSES/MASTOIDS: Mild scattered paranasal sinus mucosal thickening. Suspect small fluid level in the right maxillary sinus, which can be seen with acute sinusitis. No middle ear or mastoid effusion.      Impression    IMPRESSION:  1.  No acute ischemia, mass/mass effect, or abnormal intracranial enhancement.   2.  Paranasal sinus mucosal thickening with suspected fluid level in the right maxillary sinus, which can be seen with acute sinusitis in the appropriate clinical context.       US Lower Extremity Venous Duplex Bilateral    Narrative    VENOUS ULTRASOUND BILATERAL LOWER EXTREMITIES  9/25/2023 3:01 PM     HISTORY: Bilateral lower extremity redness and edema    COMPARISON: None.    Technique: Color Doppler and spectral waveform analysis performed  throughout the deep veins of both lower extremities.    FINDINGS: Both common femoral, proximal great saphenous, femoral, and  popliteal veins demonstrate normal blood flow, compression, and  augmentation. Posterior tibial and peroneal veins are compressible.      Impression    IMPRESSION: Negative  for DVT throughout both lower extremities.    DEREK RIVERA MD         SYSTEM ID:  [id number]   US Upper Extremity Venous Duplex Right    Narrative    US UPPER EXTREMITY VENOUS DUPLEX RIGHT  9/25/2023 3:02 PM     HISTORY: DVT rule out    COMPARISON: None.    TECHNIQUE: Color Doppler and spectral waveform analysis performed  throughout the deep and superficial veins of the right upper  extremity.    FINDINGS: The right internal jugular, subclavian, axillary, and  brachial veins demonstrate normal blood flow, compression (where  applicable), and augmentation. Cephalic and basilic veins are  compressible. Contralateral left internal jugular and subclavian veins  are patent.      Impression    IMPRESSION: Negative for DVT in the right upper extremity.    DEREK RIVERA MD         SYSTEM ID:  [id number]   US Lower Extremity Venous Duplex Right    Narrative    VENOUS ULTRASOUND RIGHT LOWER EXTREMITY  10/2/2023 12:25 PM     HISTORY: 63-year-old patient with swelling and thigh pain, request  made for evaluation of DVT    COMPARISON: September 25, 2023    TECHNIQUE: Color Doppler and spectral waveform analysis performed  throughout the deep veins of the right lower extremity.    FINDINGS: The right common femoral, proximal greater saphenous,  femoral, and popliteal veins demonstrate normal blood flow,  compression, and augmentation. Posterior tibial and peroneal veins are  compressible. Enlarged lymph node in the right groin is 2.1 x 2.7 x  0.6 cm. Fatty hilum is noted. Contralateral left common femoral vein  is patent.      Impression    IMPRESSION:  1. Negative for DVT in the right lower extremity.  2. Enlarged lymph node in the right groin, nonspecific, though may be  reactive.    DEREK RIVERA MD         SYSTEM ID:  [id number]       Discharge Medications   Current Discharge Medication List        START taking these medications    Details   ALPRAZolam (XANAX) 0.25 MG tablet Take 1 tablet (0.25 mg) by mouth 2 times daily as  needed for anxiety or sleep  Qty: 8 tablet, Refills: 0      buPROPion (WELLBUTRIN SR) 100 MG 12 hr tablet Take 1 tablet (100 mg) by mouth daily (with breakfast)  Qty: 90 tablet, Refills: 0    Associated Diagnoses: Major depressive disorder, recurrent episode, in full remission (H24)      gabapentin (NEURONTIN) 300 MG capsule Take 1 capsule (300 mg) by mouth 3 times daily  Qty: 90 capsule, Refills: 0    Associated Diagnoses: Major depressive disorder, recurrent episode, in full remission (H24)      methocarbamol (ROBAXIN) 500 MG tablet Take 1 tablet (500 mg) by mouth 3 times daily  Qty: 120 tablet, Refills: 0    Associated Diagnoses: Major depressive disorder, recurrent episode, in full remission (H24)      oxyCODONE (ROXICODONE) 5 MG tablet Take 1 tablet (5 mg) by mouth every 6 hours as needed for moderate pain  Qty: 10 tablet, Refills: 0      oxymetazoline (AFRIN) 0.05 % nasal spray Spray 2 sprays into both nostrils 2 times daily as needed for congestion  Qty: 15 mL, Refills: 0      polyethylene glycol (MIRALAX) 17 GM/Dose powder Take 17 g by mouth daily  Qty: 510 g, Refills: 0    Associated Diagnoses: Major depressive disorder, recurrent episode, in full remission (H24)      pramox-pe-glycerin-petrolatum (PREPARATION H) 1-0.25-14.4-15 % CREA cream Place rectally 2 times daily  Qty: 28 g, Refills: 0    Associated Diagnoses: Major depressive disorder, recurrent episode, in full remission (H24)           CONTINUE these medications which have CHANGED    Details   sertraline (ZOLOFT) 50 MG tablet Take 1 tablet (50 mg) by mouth daily (with breakfast)           CONTINUE these medications which have NOT CHANGED    Details   hydrOXYzine (ATARAX) 25 MG tablet Take 25 mg by mouth every 6 hours as needed for anxiety      zolpidem (AMBIEN) 10 MG tablet Take 10 mg by mouth At Bedtime           Allergies   No Known Allergies

## 2023-10-03 NOTE — TELEPHONE ENCOUNTER
R: 5:10PM Intake received a call from [employer] with acceptance information.  Pt accepted to [facility] by Dr Jade.  [phone] for RN to RN report.  ED updated with placement information.

## 2023-10-04 NOTE — PLAN OF CARE
Summary: Intentional drug overdose/ suicide attempt   DATE & TIME: 10/3/23 2306-2984  Cognitive Concerns/ Orientation : A&Ox4; calm, cooperative, pleasant; denies suicidal ideations. Calls as needed.   BEHAVIOR & AGGRESSION TOOL COLOR: Green  CIWA SCORE: NA   ABNL VS/O2: VSS, on RA   MOBILITY: Independent, steady. Walking halls.   PAIN MANAGMENT: Bilateral feet pain from swelling. 4-6/10. PRN Toradol IM given x1 this shift, PRN Oxy x1, PRN Xanax given x1 for anxiety.   DIET: Regular , good apetite  BOWEL/BLADDER: Continent of B&B. Using bathroom. Pt with c/o of some hemorrhoids, preparation H twice day  ABNL LAB/BG: K 4.0  DRAIN/DEVICES: No PIV access, MD aware.   TELEMETRY RHYTHM: NA  SKIN: Dry, flaky, scattered bruising, redness /swelling on feet; chronic condition per patient, +3 edema on the RLE. Right arm partially paralyzed.   TESTS/PROCEDURES: s/p US LE, negative for DVT, noted enlarged lymph in the Rt groin  D/C DATE: Bed opened up this evening for pt. Transfer to [facility] completed this shift.   OTHER IMPORTANT INFO: Pain Team, Psych and GI following. PT/OT following. Pt is no longer on suicide precautions.

## 2023-10-04 NOTE — CONSULTS
Triage and Transition - Consult and Liaison     Gabriela Gray  October 3, 2023    Session start: 1:30 pm  Session end: 1:56 pm  Session duration in minutes: 26 min  CPT utilized: 43360 - Psychotherapy (with patient) - 30 (16-37*) min  Patient was seen in-person.  Anticipated number of sessions or this episode of care: 1-4    Diagnosis:   296.33 (F33.2) Major Depressive Disorder, Recurrent Episode, Severe _, by history;      Plan/Recommendations:   Provided brief supportive therapy. Discussed possibility of referral to [facility]/PHP through assessment at assessment center. Patient was interested in more information and seeing when next available appointment would be. Plan to look into this and provide information at next visit.     Reason for consult: Gabriela is 63 year old White  female . Psychiatry consult was requested due to therapy- awaiting inpatient psych. Patient was seen by [facility] Consult & Liaison team.     Presenting problem: admitted on 9/23/2023 in the setting of overdose on number 9 tablets of morphine ER 60 mg, number 5 tablets of OxyContin 30 mg and an unknown number of Ambien. Patient's son found her down, as she had been staying with him. She has been admitted medically since, now awaiting inpatient psychiatry. Patient has been followed by Dr. Piña, see her notes for reference.     Session Summary: Patient seen sitting up in room, meeting with her son. She is alert and engaged, at times she makes jokes and laughs, although these jokes are at her expense. She reports she has got herself in a  mess. Shares recent psychosocial stressors- including leaving partner of 25 years to be with ex  in may of this year. She states it didn't work out and is now living with her kids. She shares about her physical health struggles and pain she experiences. She reports she doesn't think she can feel better, expresses a lot of hopelessness and helplessness. She reports she has not had suicidal  thoughts recently. We review whether she has engaged in therapy previously, she states she has but says she has never found one that was particularly helpful, would see them briefly and then stop. Discussed options of IOP/PHP and what those services look like. She states she doesn't know what to do, appears anxious. States she is worried she will go into withdrawal from opiates. Wonders if she should go to narcotics annoymous for support, stating the only time she ever felt heard or understood was an alanon meetings. She is open to hearing more about PHP/IOP and seeing if this would be a good fit. She reports her son could take her to these appointments.    Mental Status Exam   Affect: Appropriate  Appearance: Appropriate   Attention Span/Concentration: Attentive    Eye Contact: Engaged  Fund of Knowledge: Appropriate   Language /Speech Content: Fluent  Language /Speech Volume: Normal   Language /Speech Rate/Productions: Articulate   Recent Memory: Intact  Remote Memory: Intact  Mood: Anxious and Sad   Orientation:   Person: Yes   Place: Yes  Time of Day: Yes   Date: Yes   Situation (Do they understand why they are here?): Yes   Psychomotor Behavior: Normal   Thought Content: Clear  Thought Form: Intact    Current medications:   Current Facility-Administered Medications   Medication    ALPRAZolam (XANAX) tablet 0.25 mg    benzocaine-menthol (CHLORASEPTIC) 6-10 MG lozenge 1 lozenge    bisacodyl (DULCOLAX) suppository 10 mg    buPROPion (WELLBUTRIN SR) 12 hr tablet 100 mg    camphor-menthol (DERMASARRA) lotion    gabapentin (NEURONTIN) capsule 300 mg    hydrOXYzine (ATARAX) tablet 25 mg    Or    hydrOXYzine (ATARAX) tablet 50 mg    ketorolac (TORADOL) injection 15 mg    lidocaine (LMX4) cream    lidocaine (XYLOCAINE) 5 % ointment    lidocaine 1 % 0.1-1 mL    magnesium hydroxide (MILK OF MAGNESIA) suspension 30 mL    melatonin tablet 1 mg    methocarbamol (ROBAXIN) tablet 500 mg    naloxone (NARCAN) injection 0.2 mg     Or    naloxone (NARCAN) injection 0.4 mg    Or    naloxone (NARCAN) injection 0.2 mg    Or    naloxone (NARCAN) injection 0.4 mg    nitroGLYcerin (NITROSTAT) sublingual tablet 0.4 mg    ondansetron (ZOFRAN ODT) ODT tab 4 mg    Or    ondansetron (ZOFRAN) injection 4 mg    oxyCODONE (ROXICODONE) tablet 5 mg    polyethylene glycol (MIRALAX) Packet 17 g    pramox-pe-glycerin-petrolatum (PREPARATION H) cream    prochlorperazine (COMPAZINE) injection 5 mg    Or    prochlorperazine (COMPAZINE) tablet 5 mg    Or    prochlorperazine (COMPAZINE) suppository 12.5 mg    sertraline (ZOLOFT) tablet 50 mg    sodium chloride (PF) 0.9% PF flush 3 mL    zolpidem (AMBIEN) tablet 10 mg       MeasurableTreatment Goal(s) related to diagnosis / functional impairment(s)    Goal 1: Patient will develop healthy thinking patterns and beliefs about self, others, and the world that lead to the alleviation and help prevent the relapse of depression.     Objective A        Patient will describe current and past experiences with depression including their impact on functioning and attempts to resolve it.    Status: New as of October 3, 2023    Intervention(s)  LMHP will assess current and past mood episodes including their features, frequency, severity, and duration.     LMHP will encourage the patient to share their thoughts and feelings of depression, express empathy and build rapport by identifying primary cognitive, behavioral, interpersonal, or other contributors to depression.             Therapeutic intervention and progress:  Therapeutic intervention consisted of building therapeutic rapport, active listening, validation, engaging in learning/practicing coping skills, crisis management, and normalizing. Patient is making progress towards treatment goals as evidenced by decreased suicidal ideation.     Collateral information:   Reviewed chart and coordinated with son and Dr. Piña.    Purnima Vale, [facility]   Triage and Transition -  Consult and Liaison   [phone]

## 2023-10-05 NOTE — PLAN OF CARE
Occupational Therapy Discharge Summary    Reason for therapy discharge:    Discharged to [facility]    Progress towards therapy goal(s). See goals on Care Plan in [facility] electronic health record for goal details.  Goals partially met.  Barriers to achieving goals:   discharge from facility.    Therapy recommendation(s):    Pt reports she is hoping to go to senior inpatient mental health unit to address her depression and medication issues. She states she currently has no home. Pt progressing daily and anticipate upon discharge she will be good candidate for program.

## 2023-10-13 NOTE — TELEPHONE ENCOUNTER
Pt is a(n) adult (18+ out of HS) Seeking as eval for Adult Mental Health DA for Programmatic Care - Program Preference? Yes: 55+ .  Appointment scheduled by:  Other  (no cost estimate)  Caller name:  Natanael Obrien- son    Caller phone #: [phone]    Legal Guardianship Reviewed?  No  Honoring Choices Notified?  No       needed?  NO    Contact information verified/updated: Yes    Collette Rodriguez

## 2023-11-01 NOTE — PROGRESS NOTES
"  LOCUS Worksheet     Name: Gabriela Gray MRN: [id number]    : 1960      Gender:  female    PMI:     Provider Name: Manny Munroe   Provider NPI: [id number]    Actual level of Care Provided: Diagnostic assessment.  Patient is connected with individual therapy and outpatient psychiatry    Service(s) receiving or referred to: Programmatic care/[facility] for 55+    Reason for Variance: Not applicable      Rating completed by: Same      I. Risk of Harm:   2      Low Risk of Harm    II. Functional Status:   4      Serious Impairment    III. Co-Morbidity:   3      Significant Co-Morbidity    IV - A. Recovery Environment - Level of Stress:   2      Mildly Stressful Environment    IV - B. Recovery Environment - Level of Support:   2      Supportive Environment    V. Treatment and Recovery History:   3      Moderate to Equivocal Response to Treatment and Recovery Management    VI. Engagement and Recovery Project:   3      Limited Engagement and Recovery       19 Composite Score    Level of Care Recommendation:   17 to 19       High Intensity Community Based Services          [facility]    PATIENT'S NAME: Gabriela Gray  PREFERRED NAME: Jess  PRONOUNS: She/her  MRN: [id number]  : 1960  ADDRESS: [address] [place], [place] [address]T. NUMBER:  [id number]  DATE OF SERVICE: 23  START TIME: 13:08  END TIME: 14:22  PREFERRED PHONE: [phone]  May we leave a program related message: Yes  SERVICE MODALITY:  In-person    UNIVERSAL ADULT Mental Health DIAGNOSTIC ASSESSMENT    Identifying Information:  Patient is a 63 year old,  individual.  Patient was referred for an assessment by  ? .  Patient attended the session with son, Natanael .    Chief Complaint:   The reason for seeking services at this time is: \"depression.\"   The problem(s) began several years ago but increased since May 2023. Patient has attempted to " "resolve these concerns in the past through therapy, medications and a recent inpatient admission .    Patient's EMR shows she recently had a DA completed through [facility] on  10/17/2023. Patient was recommended to attend and did schedule to see a therapist, Rajni Rose PsyD every three weeks. She is also scheduled to a psychiatrist, Jamil Ortiz on 11/21/2023.  Patient confirms that she did meet with that provider and plans to continue individual therapy but there were concerns about insurance coverage and no talks about attending the day treatment/IOP through [facility].    Patient's EMR also shows that she was inpatient from 09/23 to 10/03/2023.  Patient confirms that she was at [facility] for approximately 8 to 9 days after an intentional overdose and having subsequent medical issues.  She was then sent to a geropsych unit at [facility] for a few days.  Patient reports she intentionally overdosed on several of her medications in an attempt to end her life.  Patient was staying at her son's house for the night and waited until he went to bed.  Patient states that she did it knowing that he would not find her and fully expected to be dead by morning.  Patient was unconscious when she was found.  Patient's son did find a suicide note.  Patient had told the ED providers that she was suffering from chronic pain and wanted to end her life because she could not deal with the pain anymore.  Patient confirms she has been dealing with chronic pain issues for over 15 years without finding much relief.  Patient also expresses a lot of recent life dissatisfaction and uncertainty about her future.  Patient was attending a pain clinic that provided her with a lot of opiates and she had stockpiled these for several years.  Patient eventually took herself off of the medication and detoxed on her own.  She was able to go without any pain medication for a few years and then started \"self-medicating\" with small " "amounts of opiates.  Patient indicates this went on for a few years until earlier this year.  Patient describes suddenly deciding to walk away from a long-term relationship and rekindle a relationship with her ex- who she has not seen in over 30 years.  Patient describes she made a lot of \"poor choices\" including spending money that she should not.  Patient realized that she was causing problems for herself and her family and started to worry about her future.  Patient admits that she started having suicidal ideation a week before the actual attempt but is not sure why she made the decision to attempt that day.  Patient does recognize it was the day that her ex- closed on selling his house in [place] with the plan of moving up here to be with her.  Patient realized that everything was coming to a head and she did not know how to deal with that.  Patient reports she has been struggling with depression since she was a teenager.  She felt that she always had to \"keep it together\" in order to take care of her own children and her grandson.  Patient now feels that no one else needs her.  Patient did have another episode of suicidal thoughts in her 20s and ended up cutting her wrists.  She was admitted to [facility] at that time.  Patient attempted various forms of therapy and medication over the years but felt that nothing was very helpful.  Patient did stop taking her medication in June 2023 and felt \"happy\" without it.  Patient feels that she was possibly having some manic symptoms but has never been diagnosed with bipolar disorder.  Patient feels that she has been making a lot of impulsive and risky decisions over the past couple of years in an effort to try and feel better from her depression and hopelessness.  Patient is back on her medication and willing to retry therapy but feels hopeless and helpless that anything will work.  Patient previously attended a group therapy program about 20 years " "ago and did find it was helpful.  She also attended [employer] 7 years ago and went for 2 years, and also found this helpful as she felt support from others    SI: Patient denies she is having any suicidal ideation today and feels that it stopped when she was in the hospital.  Patient indicates that she would not try to do anything to hurt herself again because of her family and also because she does not have access to anything.  Patient reports two prior suicide attempts, once in her 20s and the one recently.  She denies any history of aborted or interrupted attempts.  Patient states she does feel safe.  C-SSRS and safety plan completed.  Patient declined a copy of her safety plan as she does not want it.  SH: denies current or past  HI: denies current or past.  Denies any history of aggressive behavior    Sleep: Patient reports she has been taking Ambien for over 15 years and this is the only way she can fall asleep.  She indicates she would not be able to sleep without it.  She is averaging over 10 hours a night but still has issues with energy during the day, especially as it relates to her chronic pain and physical limitations.  Patient is taking daily naps that last 1 to 2 hours.  Appetite: decreased recently    Social/Family History:  Patient reported they grew up in  various places in, [place].  Family moved around every couple years due to her father changing jobs a lot. Was a  and kept getting moved to new [employer] locations. They were raised by biological parents.  Parents  53 years ago when the patient was 10 years old. The patient's father did remarry several years ago. Remarried a couple times since she was a teenager. Mother did not remarry. Patient has one younger brother and one younger half-sister. She is the oldest.   Patient reported that their childhood was \"unhappy.\" Family had to move a lot. No permanence. Mother had Schizoid Personality Disorder. Denies abuse but neglect due to poor " parenting.  Patient described their current relationships with family of origin as: parents are . Some contact with half-sister but not close. Not meet until patient was in her 40s. Not talk to brother in a few years.      The patient describes their cultural background as: father was  and mother was an atheist.  Cultural influences and impact on patient's life structure, values, norms, and healthcare:  nothing identified .  Contextual influences on patient's health include: Contextual Factors: Health- Seeking Factors pain issues .  Cultural, Contextual, and socioeconomic factors do affect the patient's access to services.  These factors will be addressed in the Preliminary Treatment plan.  Patient identified their preferred language to be English. Patient reported they do not need the assistance of an  or other support involved in therapy.     Patient reported experienced significant delays in developmental tasks, such as delayed walking until 18 months .   Patient's highest education level was some college. Patient identified the following learning problems: attention and concentration.  Struggled after elementary school. Not motivated to do anything. Grades were average. Modifications will not be used to assist communication in therapy.  Patient reports they are since able to understand written materials.    Patient reported the following relationship history: started dating at age 10.  Patient's current relationship status is partnered / significant other. Back together with ex- after not talk for 30 years and walked away from long term relationship earlier this year. Impulsive. Prior marriage with ex for a few years and then prior marriage for 7 years (children's father).   Patient identified their sexual orientation as heterosexual.  Patient reported having two child(nithya). Patient identified adult child and friends as part of their support system.  Patient identified the  quality of these relationships as fair.     Patient's current living/housing situation involves staying with daughter .  They live with daughter and her family, and they report that housing is stable.     Patient is currently disabled.  Patient reports [facility] and son .  Patient does identify finances as a current stressor.      Patient reported that they have not been involved with the legal system. Patient denies being on probation / parole / under the jurisdiction of the court.    Patient's Strengths and Limitations:  Patient identified the following strengths or resources that will help them succeed in treatment: family support and motivation. Things that may interfere with the patient's success in treatment include: financial hardship, lack of social support, and physical health concerns.     Assessments:  The following assessments were completed by patient for this visit:  PHQ9:       11/1/2023    12:00 PM   PHQ-9 SCORE   PHQ-9 Total Score 16     GAD7:       11/1/2023    12:00 PM   SANG-7 SCORE   Total Score 13     CAGE-AID:       11/1/2023    12:00 PM   CAGE-AID Total Score   Total Score 2     PROMIS 10-Global Health (all questions and answers displayed):       11/1/2023    12:00 PM   PROMIS 10   In general, would you say your health is: 3   In general, would you say your quality of life is: 1   In general, how would you rate your physical health? 3   In general, how would you rate your mental health, including your mood and your ability to think? 1   In general, how would you rate your satisfaction with your social activities and relationships? 1   In general, please rate how well you carry out your usual social activities and roles. (This includes activities at home, at work and in your community, and responsibilities as a parent, child, spouse, employee, friend, etc.) 1   To what extent are you able to carry out your everyday physical activities such as walking,  climbing stairs, carrying groceries, or moving a chair? 2   In the past 7 days, how often have you been bothered by emotional problems such as feeling anxious, depressed, or irritable? 5   In the past 7 days, how would you rate your fatigue on average? 3   In the past 7 days, how would you rate your pain on average, where 0 means no pain, and 10 means worst imaginable pain? 6   Global Mental Health Score 4   Global Physical Health Score 11   PROMIS TOTAL - SUBSCORES 15     Parmer Suicide Severity Rating Scale (Lifetime/Recent)      11/1/2023     3:00 PM   Parmer Suicide Severity Rating (Lifetime/Recent)   Q1 Wish to be Dead (Lifetime) Y   Wish to be Dead Description (Lifetime) Patient has been having thoughts of wishing life could be over since her 20s   1. Wish to be Dead (Past 1 Month) Y   Wish to be Dead Description (Past 1 Month) Patient denies any current thoughts but was having them earlier this month   Q2 Non-Specific Active Suicidal Thoughts (Lifetime) Y   Non-Specific Active Suicidal Thought Description (Lifetime) Patient has had suicidal thoughts since her 20s   2. Non-Specific Active Suicidal Thoughts (Past 1 Month) Y   Non-Specific Active Suicidal Thought Description (Past 1 Month) Patient denies any suicidal thoughts now but was having them earlier this month   3. Active Suicidal Ideation with any Methods (Not Plan) Without Intent to Act (Lifetime) Y   Active Suicidal Ideation with any Methods (Not Plan) Description (Lifetime) Patient has previously thought of ways to end her life   Q3 Active Suicidal Ideation with any Methods (Not Plan) Without Intent to Act (Past 1 Month) N   Q4 Active Suicidal Ideation with Some Intent to Act, Without Specific Plan (Lifetime) N   4. Active Suicidal Ideation with Some Intent to Act, Without Specific Plan (Past 1 Month) N   Q5 Active Suicidal Ideation with Specific Plan and Intent (Lifetime) Y   Active Suicidal Ideation with Specific Plan and Intent Description  (Lifetime) Patient had a previous attempt in her 20s   5. Active Suicidal Ideation with Specific Plan and Intent (Past 1 Month) Y   Active Suicidal Ideation with Specific Plan and Intent Description (Past 1 Month) Patient had an attempt earlier this month   Actual Attempt (Lifetime) Y   Total Number of Actual Attempts (Lifetime) 2   Actual Attempt Description (Lifetime) Patient tried to cut her wrists in her 20s and had an intentional overdose in September 2023   Actual Attempt (Past 3 Months) Y   Total Number of Actual Attempts (Past 3 Months) 1   Actual Attempt Description (Past 3 Months) Patient had an intentional overdose in September 2023   Has subject engaged in non-suicidal self-injurious behavior? (Lifetime) N   Interrupted Attempts (Lifetime) N   Aborted or Self-Interrupted Attempt (Lifetime) N   Preparatory Acts or Behavior (Lifetime) Y   Total Number of Preparatory Acts (Lifetime) 1   Preparatory Acts or Behavior Description (Lifetime) Patient Center medication and wrote a suicide note   Preparatory Acts or Behavior (Past 3 Months) Y   Total Number of Preparatory Acts (Past 3 Months) 1   Preparatory Acts or Behavior Description (Past 3 Months) See above   Calculated C-SSRS Risk Score (Lifetime/Recent) High Risk       Personal and Family Medical History:  Patient does report a family history of mental health concerns.  Patient reports family history is not on file.    Mother had Schizotypal Personality.    Patient does report Mental Health Diagnosis and/or Treatment.  Patient reported the following previous diagnoses which include(s): Depression.  Patient reported symptoms began as a teenager and she started getting help in her early 20s.   Patient has received mental health services in the past: therapy with various providers including marriage therapy at age 23, episodes of therapy in her 30s and 40s.  Being referred to a pain clinic for therapy approximately 15 years ago, day treatment with unknown  location approximately 20 years ago, and inpatient mental health services at [facility] in her 20s .  Psychiatric Hospitalizations: [facility] early 20s and [facility] September/October 2023 .  Patient denies a history of civil commitment.  Patient is receiving other mental health services.  These include psychotherapy with provider at [facility] and psychiatry with provider at [facility].  Next appointment: 11/21/2023 and 12/13/2023.  Patient is scheduled to see a therapist every 3 weeks .       Patient has had a physical exam to rule out medical causes for current symptoms.  Date of last physical exam was within the past year. Client was encouraged to follow up with PCP if symptoms were to develop. The patient has a non-[place] Primary Care Provider. Their PCP is through [facility]..  Patient reports the following current medical concerns: chronic pain and no current dental concerns.  Patient reports pain concerns including chronic pain.  Patient does not want help addressing pain concerns..   There are not significant appetite / nutritional concerns / weight changes. These may include: no concerns. Patient reports the following sleep concerns:  No concerns.   Patient does not report a history of head injury / trauma / cognitive impairment.    Patient reports current meds as:   Outpatient Medications Marked as Taking for the 11/1/23 encounter (Hospital Encounter) with Collette Edwards LICSW   Medication Sig    buPROPion (WELLBUTRIN SR) 100 MG 12 hr tablet Take 1 tablet (100 mg) by mouth daily (with breakfast)    hydrOXYzine (ATARAX) 25 MG tablet Take 25 mg by mouth every 6 hours as needed for anxiety    oxyCODONE (ROXICODONE) 5 MG tablet Take 1 tablet (5 mg) by mouth every 6 hours as needed for moderate pain    oxymetazoline (AFRIN) 0.05 % nasal spray Spray 2 sprays into both nostrils 2 times daily as needed for congestion    sertraline (ZOLOFT) 50 MG tablet Take 1 tablet (50 mg) by mouth daily (with  breakfast)    zolpidem (AMBIEN) 10 MG tablet Take 10 mg by mouth At Bedtime       Medication Adherence:  Patient reports taking prescribed medications as prescribed.    Patient Allergies:  No Known Allergies    Medical History:    Past Medical History:   Diagnosis Date    Depression     Erythromelalgia (H)          Current Mental Status Exam:   Appearance:  Appropriate    Eye Contact:  Fair   Psychomotor:  Normal       Gait / station:  slow  Attitude / Demeanor: Cooperative  Interested  Speech      Rate / Production: Normal/ Responsive      Volume:  Normal  volume      Language:  intact  Mood:   Depressed   Affect:   Flat  Tearful   Thought Content: Clear   Thought Process: Coherent       Associations: No loosening of associations  Insight:   Fair   Judgment:  Intact   Orientation:  All  Attention/concentration: Good    Substance Use:  Patient did not report a family history of substance use concerns; see medical history section for details.  Patient has not received chemical dependency treatment in the past.  Patient has not ever been to detox.      Patient is not currently receiving any chemical dependency treatment. Patient reported the following problems as a result of their substance use:   none .    Patient denies using alcohol. Not have in years.  Patient denies using tobacco.  Patient denies using cannabis.  Patient reports using caffeine 1 times per day and drinks 1 at a time. Patient started using caffeine at age 18.  Patient reports using/abusing the following substance(s). Opiates: Patient admits she was intentionally abusing her opiates and then took herself off of them for a few years.  She used prescription medication that was no longer prescribed at a low dose for a few years until earlier this year when she increased her use.  Substance Use:  Needing to use increasing amounts to manage her pain    Based on the positive CAGE score and clinical interview there  are not indications of drug or alcohol  abuse.    Significant Losses / Trauma / Abuse / Neglect Issues:   Patient did not serve in the .  There are indications or report of significant loss, trauma, abuse or neglect issues related to:  neglect as a child, major medical and pain issues the past 15 years and current relationship issues .  Concerns for possible neglect are not present.    Safety Assessment:   Patient denies current homicidal ideation and behaviors.  Patient denies current self-injurious ideation and behaviors.    Patient  using nonprescribed opiates to manage pain (old Rxs)  associated with substance use.  Patient reported impulsive/compulsive spending behaviors reported impulsive decisionmaking reported substance use associated with mental health symptoms.  Patient reports the following current concerns for their personal safety: None.  Patient reports there are not firearms in the house.    History of Safety Concerns:  Patient denied a history of homicidal ideation.     Patient reported a history of personal safety concerns: neglect as a child  Patient denied a history of assaultive behaviors.    Patient denied a history of sexual assault behaviors.     Patient denied a history of risk behaviors associated with substance use.  Patient reported a history of impulsive/compulsive spending behaviors reported a history of impulsive decision making reported a history of substance use associated with mental health symptoms.  Patient reports the following protective factors:     Risk Plan:  See Recommendations for Safety and Risk Management Plan    Review of Symptoms per patient report:   Depression: Change in sleep, Lack of interest, Excessive or inappropriate guilt, Change in energy level, Psychomotor slowing or agitation, Suicidal ideation, Feelings of hopelessness, Feelings of helplessness, Low self-worth, Ruminations, Feeling sad, down, or depressed, Withdrawn, Poor hygeine, and numb feeling  Janine:  Elevated mood, Racing thoughts,  "and Impulsiveness \"swings\"- sudden, impulsive, risky decisions and felt very excited for 1-2 months. Sudden trip to [place] after not drive on freeway for 2 years. Spending more than should on Legos, jewelry supplies for the past two years. Hard to pay bills so children had to help. Never diagnosed with Bipolar. Did to feel better when miserable and not compulsion.  Psychosis: No Symptoms  Anxiety: Excessive worry, Nervousness, Physical complaints, such as headaches, stomachaches, muscle tension, Social anxiety, Fears/phobias being in pain, Sleep disturbance, Ruminations, and Poor concentration always struggled with feeling inadequate around others, not enjoy people, always felt disconnected, worried about perceptions and judgement from others, needing to act. Whole life.  Panic:  Palpitations, Sense of impending doom, Hot or cold flashes, and afraid feeling  Post Traumatic Stress Disorder:  No Symptoms   Eating Disorder: No Symptoms  ADD / ADHD:  Inattentive, Poor task completion, Poor organizational skills, Distractibility, and Forgetful Dx: 2005 used to take Ritalin but doctor stopped and not say why. Stopped in 2010.  Conduct Disorder: No symptoms  Autism Spectrum Disorder: No symptoms  Obsessive Compulsive Disorder: No Symptoms    Patient reports the following compulsive behaviors and treatment history: Shopping / Spending - has not had treatment..      Diagnostic Criteria:   Generalized Anxiety Disorder  A. Excessive anxiety and worry about a number of events or activities (such as work or school performance).   B. The person finds it difficult to control the worry.  C. Select 3 or more symptoms (required for diagnosis). Only one item is required in children.   - Restlessness or feeling keyed up or on edge.    - Being easily fatigued.    - Difficulty concentrating or mind going blank.    - Muscle tension.    - Sleep disturbance (difficulty falling or staying asleep, or restless unsatisfying sleep).   D. The " focus of the anxiety and worry is not confined to features of an Axis I disorder.  E. The anxiety, worry, or physical symptoms cause clinically significant distress or impairment in social, occupational, or other important areas of functioning.   F. The disturbance is not due to the direct physiological effects of a substance (e.g., a drug of abuse, a medication) or a general medical condition (e.g., hyperthyroidism) and does not occur exclusively during a Mood Disorder, a Psychotic Disorder, or a Pervasive Developmental Disorder.    - The aformentioned symptoms began several year(s) ago and occurs 7 days per week and is experienced as severe.  Social Anxiety Disorder, Marked fear or anxiety about one or more social situations in which the individual is exposed to possible scrutiny by others. Examples include social interactions (e.g., having a conversation, meeting unfamiliar people), being observed (e.g., eating or drinking), and performing in front of others (e.g., giving a speech)., The individual fears that he or she will act in a way or show anxiety symptoms that will be negatively evaluated, The social situations almost always provoke fear or anxiety., The social situations are avoided or endured with intense fear or anxiety., The fear or anxiety is out of proportion to the actual threat posed by the social situation and to the sociocultural context., The fear, anxiety, or avoidance is persistent, typically lasting for 6 months or mo, The fear, anxiety, or avoidance causes clinically significant distress or impairment in social, occupational, or other important areas of functioning., The fear, anxiety, or avoidance is not attributable to the physiological effects of a substance (e.g., a drug of abuse, a medication) or another medical condition., The fear, anxiety, or avoidance is not better explained by the symptoms of another mental disorder, and If another medical condition is present, the fear, anxiety, or  avoidance is clearly unrelated or is excessive Major Depressive Disorder  CRITERIA (A-C) REPRESENT A MAJOR DEPRESSIVE EPISODE - SELECT THESE CRITERIA  A) Recurrent episode(s) - symptoms have been present during the same 2-week period and represent a change from previous functioning 5 or more symptoms (required for diagnosis)   - Depressed mood. Note: In children and adolescents, can be irritable mood.     - Diminished interest or pleasure in all, or almost all, activities.    - Psychomotor activity retardation.    - Fatigue or loss of energy.    - Feelings of worthlessness or inappropriate and excessive guilt.    - Diminished ability to think or concentrate, or indecisiveness.    - Recurrent thoughts of death (not just fear of dying), recurrent suicidal ideation without a specific plan, or a suicide attempt or a specific plan for committing suicide.   B) The symptoms cause clinically significant distress or impairment in social, occupational, or other important areas of functioning  C) The episode is not attributable to the physiological effects of a substance or to another medical condition  D) The occurence of major depressive episode is not better explained by other thought / psychotic disorders  E) There has never been a manic episode or hypomanic episode    Functional Status:  Patient reports the following functional impairments:  chronic disease management, money management, relationship(s), self-care, and social interactions.     Programmatic care:  Current LOCUS was assigned and patient needs the following level of care based on score 19  .    Clinical Summary:  1. Reason for assessment: Diagnostic assessment for programmatic care.  2. Psychosocial, Cultural and Contextual Factors: Patient is currently living with her adult daughter.  She has limited family and social supports.  She is dealing with chronic pain and medical issues that increased her mental health symptoms.  Patient is unable to work because  of her physical disabilities.  3. Principal DSM5 Diagnoses  (Sustained by DSM5 Criteria Listed Above):   296.33 (F33.2) Major Depressive Disorder, Recurrent Episode, Severe _  300.02 (F41.1) Generalized Anxiety Disorder.  4. Other Diagnoses that is relevant to services:   300.23 (F40.10) Social Anxiety Disorder  Substance-Related & Addictive Disorders Opioid Use Disorder, Specify if:  On a maintenance therapy with a severity of:  304.00 (F11.20) Severe.  5. Provisional Diagnosis: Not applicable.  6. Prognosis: Expect Improvement and Relieve Acute Symptoms.  7. Likely consequences of symptoms if not treated: Patient would continue to experience negative symptoms that impair functioning in multiple areas of life.  There are concerns that patient could potentially require another inpatient psychiatric admission should she have an increase in suicidal ideation and another suicide attempt.  8. Client strengths include:  caring, committed to sobriety, creative, empathetic, good listener, has a previous history of therapy, insightful, intelligent, motivated, open to learning, support of family, friends and providers, wants to learn, and willing to relate to others .     Recommendations:     1. Plan for Safety and Risk Management:   Safety and Risk: A safety and risk management plan has been developed including: Patient consented to co-developed safety plan.  Safety and risk management plan was completed - see below.  Patient agreed to use safety plan should any safety concerns arise.  A copy was given to the patient..          Report to child / adult protection services was NA.     2. Patient's identified physical health concerns with a cultural influence will be addressed by patient having chronic pain and sometimes requiring opiates to manage that pain.  She feels this can be a barrier at times .     3. Initial Treatment will focus on:    Depressed Mood - patient will report improvement in her overall mood and  motivation.  She will have a decrease in suicidal ideation and behaviors .     4. Resources/Service Plan:    services are not indicated.   Modifications to assist communication are not indicated.   Additional disability accommodations are not indicated.      5. Collaboration:   Collaboration / coordination of treatment will be initiated with the following  support professionals: outpatient therapist and psychiatry.      6.  Referrals:   The following referral(s) will be initiated: Day Treatment. 55+     A Release of Information has been obtained for the following:  None completed today .     Clinical Substantiation/medical necessity for the above recommendations: Patient was recently hospitalized for an intentional overdose.  Patient admits that she feels there is no purpose in life because of her chronic pain and increasing life stressors.  Patient presents with symptoms of MDD, recurrent, severe and SANG.  There are also concerns for social anxiety and opiate use disorder.  Patient is reconnected with individual therapy and psychiatry but has not benefited much in the past.  Patient would benefit from programmatic care including IOP for 55+ in order to learn and practice more effective ways to manage her mental illness.    7. AMADOR:    AMADOR:  Discussed the general effects of drugs and alcohol on health and well-being. Provider gave patient printed information about the  effects of chemical use on their health and well being. Recommendations: Patient was encouraged to talk with her pain clinic next week about more effective ways to manage her pain and to follow her prescriptions without overusing.  Patient denies other substance use.     8. Records:   These were reviewed at time of assessment.   Information in this assessment was obtained from the medical record and  provided by patient who is a good historian.    Patient will have open access to their mental health medical record.    9.   Interactive  "Complexity: No    10. Safety Plan:  When the patient identifies the following:  Suicidal Ideation with intent or intent & plan  (Yes to C-SSRS Suicidal Ideation #4 and #5) in the past month     The following will be initiated:  Complete/Review/Update Safety Plan    Provider Name/ Credentials:  Manny Munroe  November 1, 2023      Outpatient Mental Health Services - Adult    MY COPING PLAN FOR SAFETY    PATIENT'S NAME: Gabriela Gray  MRN:   [id number]    SAFETY PLAN:    Step 1: Warning signs / cues (Thoughts, images, mood, situation, behavior) that a crisis may be developing:    Thoughts: \"Nothing makes it better\"  Images:  none reported  Thinking Processes: ruminations (can't stop thinking about my problems): nothing is getting better and highly critical and negative thoughts: making poor choices  Mood: worsening depression, hopelessness, and helplessness  Behaviors: isolating/withdrawing , not taking care of myself, not taking care of my responsibilities, sleeping too much, not sleeping enough, and self medicating  Situations: pain, relationship problems, financial stress, and possessions in storage locker      Step 2: Coping strategies - Things I can do to take my mind off of my problems without contacting another person (relaxation technique, physical activity):    Distress Tolerance Strategies:  arts and crafts: using supplies to make jewelry and audiobooks  Physical Activities: chair yoga and stretching , physical therapy  Focus on helpful thoughts:  remind myself of what is important to me: family    Step 3: People and social settings that provide distraction:     Name: Tonya (friend) Phone:    Name:    Phone:    No social settings- too painful to go enjoy things      Step 4: Remind myself of people and things that are important to me and worth living for:  Family. Figuring out next steps and future.        Step 5: When I am in crisis, I can ask these people to help me use my safety " plan:     Name: Son  Phone:    Name: Sophie Phone:     Step 6: Making the environment safe:     Not have access to anything- daughter secured everything.    Step 7: Professionals or agencies I can contact during a crisis:    Suicide Prevention Lifeline: 3-316-719-TALK (4045), 982  Crisis Text Line Service (available 24 hours a day, 7 days a week): Text [place] to 971516  Call  **CRISIS (944336) from a cell phone to talk to a team of professionals who can help you.  EmPath or [facility] [place]    Crisis Services By [facility]: Phone Number:   [place]     [phone]   [place]    [phone]   [place]    [phone]   [place]    [phone]   [place]    [phone]   [place] [phone]   [place]     [phone]     Call 911 or go to my nearest emergency department.     I helped develop this safety plan and agree to use it when needed.  I have been given a copy of this plan.      Client signature _________________________________________________________________  Today s date:  11/1/2023  Adapted from Safety Plan Template 2008 Roberta Clark and José Miguel Vargas is reprinted with the express permission of the authors.  No portion of the Safety Plan Template may be reproduced without the express, written permission.  You can contact the authors at [email or web address] or [email or web address]

## 2023-11-03 NOTE — TELEPHONE ENCOUNTER
Writer and patient discussed programming, patient will start in IOP/DT 3 on 11/9.    Mikaela Luna LPC on 11/3/2023 at 10:31 AM

## 2023-11-03 NOTE — TELEPHONE ENCOUNTER
----- Message from Mikaela Luna [facility] sent at 11/3/2023 10:34 AM CDT -----  Regarding: Patient starting IOP/DT 3 on 11/9  Adult Mental Health Programmatic Care Schedule Request    Patient Name: Gabriela Gray  Location of programming: [facility]  Start Date: 11/9/23      Adult Program Group: Adult Program Group: IOP/DT 3  55+ Track [[id number]]  Schedule: M, W, Th, F 1pm-4pm  12 hours per week for 9 weeks  Number of visits to be scheduled: 36 days    Attending Provider (MD):  Dr Jordon Penny.  Visit Type:  In-Person    Accommodations Needed: N/A  Alerts Identified/Substantiation: N/a  Consulted with Supervisor: N/A    Send to:   [UR BEH BCA (33760)] ##ONLY if Start Date is determined##  NG 14 OBC's (BEH BEHAVIORAL OUTPATIENT ASSESSMENTS [86704])   Mikalea Luna (Community HealthOP)  Dasha Whyte (PHP)  Shagufta Ashlye

## 2023-11-06 NOTE — TELEPHONE ENCOUNTER
Mikaela Luna [facility]  P Beh Behavorial Outpatient Assessments; P Ur Beh Bca; Mayi Temple, [facility]; Eleonora Llamas, SW; Bernice Rock, RN; 2 others  Patient would like to move start to 11/13. Thanks,    Mikaela Luna [facility] on 11/6/2023 at 10:01 AM

## 2023-11-10 NOTE — PROGRESS NOTES
"    Admission SBAR NOTE  Adult  Outpatient Programs          SITUATION:     Admission Date: 11/10/2023    Provider verified identity through the following two step process.  Patient provided: verbal spelling of full first and last name and Patient     Patient name:  Gabriela Gray  Preferred name: Gabriela Downey/Her/Hers/Herself 63 year old  Diagnosis/-es (copy from , including ICD-10):   Principal DSM5 Diagnoses  (Sustained by DSM5 Criteria Listed Above):   296.33 (F33.2) Major Depressive Disorder, Recurrent Episode, Severe _  300.02 (F41.1) Generalized Anxiety Disorder.  4. Other Diagnoses that is relevant to services:   300.23 (F40.10) Social Anxiety Disorder  Substance-Related & Addictive Disorders Opioid Use Disorder, Specify if:  On a maintenance therapy with a severity of:  304.00 (F11.20) Severe.    Assigned Program/Track: IOP/DT 3    Reviewed patient's schedule and informed them of any variation due to holidays. yes    Does the patient have any planned absences and/or barriers to admission/treatment? no  NOTE: impact of transportation, technology, childcare, work, or housing concerns.    Insurance: Payor: [employer] / Plan: [employer] MEDICARE / Product Type: HMO /  Changes/Concerns: no    Does patient need an appointment with the program provider? no  NOTE: If yes, please confirm/schedule provider visit.      BACKGROUND:     Patient's stated goal/reason for treatment (copy from [employer]; confirm with patient): \"depression \"      ASSESSMENT:     Please consult  if any of the following concerns may impact admission/participation in program:     PHQ, SANG and PROMIS done within 7 days OR send upon admission if over 7 days.      [place] Suicide Severity Rating (select Lifetime/Recent): [place] Suicide Severity Rating Scale (Lifetime/Recent)      2023     3:00 PM   [place] Suicide Severity Rating (Lifetime/Recent)   Q1 Wish to be Dead (Lifetime) Y   Wish to be Dead Description (Lifetime) " Patient has been having thoughts of wishing life could be over since her 20s   1. Wish to be Dead (Past 1 Month) Y   Wish to be Dead Description (Past 1 Month) Patient denies any current thoughts but was having them earlier this month   Q2 Non-Specific Active Suicidal Thoughts (Lifetime) Y   Non-Specific Active Suicidal Thought Description (Lifetime) Patient has had suicidal thoughts since her 20s   2. Non-Specific Active Suicidal Thoughts (Past 1 Month) Y   Non-Specific Active Suicidal Thought Description (Past 1 Month) Patient denies any suicidal thoughts now but was having them earlier this month   3. Active Suicidal Ideation with any Methods (Not Plan) Without Intent to Act (Lifetime) Y   Active Suicidal Ideation with any Methods (Not Plan) Description (Lifetime) Patient has previously thought of ways to end her life   Q3 Active Suicidal Ideation with any Methods (Not Plan) Without Intent to Act (Past 1 Month) N   Q4 Active Suicidal Ideation with Some Intent to Act, Without Specific Plan (Lifetime) N   4. Active Suicidal Ideation with Some Intent to Act, Without Specific Plan (Past 1 Month) N   Q5 Active Suicidal Ideation with Specific Plan and Intent (Lifetime) Y   Active Suicidal Ideation with Specific Plan and Intent Description (Lifetime) Patient had a previous attempt in her 20s   5. Active Suicidal Ideation with Specific Plan and Intent (Past 1 Month) Y   Active Suicidal Ideation with Specific Plan and Intent Description (Past 1 Month) Patient had an attempt earlier this month   Actual Attempt (Lifetime) Y   Total Number of Actual Attempts (Lifetime) 2   Actual Attempt Description (Lifetime) Patient tried to cut her wrists in her 20s and had an intentional overdose in September 2023   Actual Attempt (Past 3 Months) Y   Total Number of Actual Attempts (Past 3 Months) 1   Actual Attempt Description (Past 3 Months) Patient had an intentional overdose in September 2023   Has subject engaged in non-suicidal  self-injurious behavior? (Lifetime) N   Interrupted Attempts (Lifetime) N   Aborted or Self-Interrupted Attempt (Lifetime) N   Preparatory Acts or Behavior (Lifetime) Y   Total Number of Preparatory Acts (Lifetime) 1   Preparatory Acts or Behavior Description (Lifetime) Patient Center medication and wrote a suicide note   Preparatory Acts or Behavior (Past 3 Months) Y   Total Number of Preparatory Acts (Past 3 Months) 1   Preparatory Acts or Behavior Description (Past 3 Months) See above   Calculated C-SSRS Risk Score (Lifetime/Recent) High Risk       LOCUS completed for recommended level of care? yes 19  IOP: 17-19; PHP: 20-22     Copy/Paste current Safety Plan to the BEH TX PLAN ENCOUNTER. yes    Safety status/concerns: None at this time     Substance use concerns: Yes, see RN screening note. History of opioid usage.   NOTE: if no substance use concerns, OK to delete below:     Patient reported last use of substances as: Last used Opioids 11/2/2023.     Patient reports/presents withdrawal/intoxication concerns: yes, currently experiencing muscle cramping, diarrhea, and anxiety    Pertinent Medical/Nutritional concerns: No    Confirm Emergency Contact listed in the SnapShot/Demographics with patient and notify OBC if an update is required. yes DALILA KINGSTON (Daughter)  [phone] (Mobile) Confirmed with patient. Verbalized that, in the event he cannot be reached by staff, we will reach out to his emergency contact.      Paper or Docusign requirements for ROIs, e-MARILY, emergency contact, etc have been completed? Yes  If not, do upon admission.     Does patient have FMLA or Short-Term Disability requests/plans? no  NOTE: Whenever possible, FMLA or Short-Term Disability paperwork needs to be managed/completed by the patient's community provider.   Exceptions: Patient does not have a community provider AND request is specific to mental health and time off for the duration of the program participation.    Notify RN  "Triage as soon as possible.     Care Providers/Medication Management Needs: None at this time    Does patient have a current community or other [facility] provider prescribing medications for mental health? Yes  NOTE: Delete below if not applicable:    Psychiatric Provider (or PCP if managing MH meds)/Name: [facility] name[facility]location[facility]  Last appointment:  11/7/2023  Next appointment:  12/13/23     NOTE: Inform patients, program is temporary and we will not be transferring care. Patient's should continue to see their community provider.       Individual Therapist/Name:  Patient was unable to recall  Clinic name/location: [facility]  Last appointment:  \"Last month sometime.\"   Next appointment:  11/21/23     Patient will continue to see above provider while participating in program: No - has been informed that therapy appointments will not be covered while participating in programming.         RECOMMENDATIONS:     Patient Admission Completed: No    Care Team, referrals made/needed: None needed at this time  PCP: No Ref-Primary, Physician  NOTE: Notify RN, as needed, to make internal referrals.                                                             Completed by: WENDY George, Yin Zhou, RUDY             "

## 2023-11-10 NOTE — PROGRESS NOTES
"  Outpatient Mental Health Services - Adult     MY COPING PLAN FOR SAFETY     PATIENT'S NAME:    Gabriela Gray  MRN:                           [id number]     SAFETY PLAN:     Step 1: Warning signs / cues (Thoughts, images, mood, situation, behavior) that a crisis may be developing:     Thoughts: \"Nothing makes it better\"  Images:  none reported  Thinking Processes: ruminations (can't stop thinking about my problems): nothing is getting better and highly critical and negative thoughts: making poor choices  Mood: worsening depression, hopelessness, and helplessness  Behaviors: isolating/withdrawing , not taking care of myself, not taking care of my responsibilities, sleeping too much, not sleeping enough, and self medicating  Situations: pain, relationship problems, financial stress, and possessions in storage locker       Step 2: Coping strategies - Things I can do to take my mind off of my problems without contacting another person (relaxation technique, physical activity):     Distress Tolerance Strategies:  arts and crafts: using supplies to make jewelry and audiobooks  Physical Activities: chair yoga and stretching , physical therapy  Focus on helpful thoughts:  remind myself of what is important to me: family     Step 3: People and social settings that provide distraction:                 Name: Tonya (friend)     Phone:               Name:                          Phone:               No social settings- too painful to go enjoy things            Step 4: Remind myself of people and things that are important to me and worth living for:  Family. Figuring out next steps and future.           Step 5: When I am in crisis, I can ask these people to help me use my safety plan:                 Name: Son                  Phone:               Name: Sophie          Phone:      Step 6: Making the environment safe:      Not have access to anything- daughter secured everything.     Step 7: Professionals or agencies " I can contact during a crisis:     Suicide Prevention Lifeline: 4-660-766-TALK (6123), 925  Crisis Text Line Service (available 24 hours a day, 7 days a week): Text [place] to 235518  Call  **CRISIS (993659) from a cell phone to talk to a team of professionals who can help you.  EmPath or [facility]          Crisis Services By [facility]: Phone Number:   [place]     [phone]   [place]    [phone]   [place]    [phone]   [place]    [phone]   [place]    [phone]   [place] [phone]   [place]     [phone]      Call 911 or go to my nearest emergency department.             I helped develop this safety plan and agree to use it when needed.  I have been given a copy of this plan.       Client signature _________________________________________________________________  Today s date:  11/1/2023  Adapted from Safety Plan Template 2008 Roberta Clark and José Miguel Vargas is reprinted with the express permission of the authors.  No portion of the Safety Plan Template may be reproduced without the express, written permission.  You can contact the authors at [email or web address] or [email or web address]

## 2023-11-13 PROBLEM — F33.2 MAJOR DEPRESSIVE DISORDER, RECURRENT EPISODE, SEVERE (H): Status: ACTIVE | Noted: 2023-01-01

## 2023-11-13 NOTE — GROUP NOTE
Psychoeducation Group Note    PATIENT'S NAME: Gabriela Gray  MRN:   [id number]  :   1960  ACCT. NUMBER: [id number]  DATE OF SERVICE: 23  START TIME:  3:00 PM  END TIME:  3:50 PM  FACILITATOR: Mayi Temple [facility]; Marnie Malin OTR  TOPIC: MH Life Skills Group: Communication and Social Skills Development  [facility] Mental Health Outpatient Programs  TRACK: IOP/DT 3    NUMBER OF PARTICIPANTS: 4    Summary of Group / Topics Discussed:  Communication and Social Skills Development: Social Participation: Facilitated discussion on the importance of social participation in their daily lives and identified areas that they are currently participating in socially. Patients discussed the benefits of social participation on their mental wellbeing and social values. Patients were provided with an opportunity to engage in a social leisure activity. Patients identified and discussed with peers and staff regarding their experience of practicing social skills (being assertive, setting boundaries, accepting positive feedback) and ways to stay connected with others. Validation, support, and feedback was provided throughout the group process.         Patient Session Goals / Objectives:    Identified strengths and opportunities for growth in the area of social participation    Improved awareness of important aspects of social participation for mental wellbeing    Engage with other peers for experiential learning of social leisure skills    Practiced and reflected on how to generalize social participation skills in their everyday life       Patient Participation / Response:  Fully participated with the group by sharing personal reflections / insights and openly received / provided feedback with other participants.    Verbalized understanding of content    Treatment Plan:  Patient has a current master individualized treatment plan.  See Epic treatment plan for more information.    Mayi HAMILTON  MARINA TempleSW

## 2023-11-13 NOTE — GROUP NOTE
Psychotherapy Group Note    PATIENT'S NAME: Gabriela Gray  MRN:   [id number]  :   1960  ACCT. NUMBER: [id number]  DATE OF SERVICE: 23  START TIME:  2:00 PM  END TIME:  2:50 PM  FACILITATOR: Eleonora Llamas LGSW  TOPIC: MH EBP Group: Emotions Management  [facility] Mental Health Outpatient Programs  TRACK: IOP/DT 3    NUMBER OF PARTICIPANTS: 4    Summary of Group / Topics Discussed:  Emotions Management: Understanding Emotions: Patients discussed the purpose of emotions and function they serve in our lives.  Reviewed core emotions, why they happen (triggers), and how they occur. The group assisted one anothers' understanding that: emotional experiences are important; difficult emotions have a place in our lives; and the differences between various emotions.    Patient Session Goals / Objectives:  Demonstrate understanding of types various emotions  Identify and discuss specific emotions and when they occur; understand triggers  Discuss barriers to emotional regulation      Patient Participation / Response:  Fully participated with the group by sharing personal reflections / insights and openly received / provided feedback with other participants.    Demonstrated understanding of topics discussed through group discussion and participation, Expressed understanding of the relevance / importance of emotions management skills at distressing times in life, Self-aware of experiences with difficult emotions, and strategies to employ to manage them, Demonstrated knowledge of when to consider applying a variety of emotions management skills in daily life, Demonstrated understanding and practice strategies to manage difficult emotions and move towards healing, Identified barriers to applying emotions management strategies, Identified strategies to overcome barriers to use of emotions management skills, and Identified emotions management strategies that have helped maintain / improve symptoms in  the past    Treatment Plan:  Patient has a current master individualized treatment plan.  See Epic treatment plan for more information.    Eleonora Llamas LGSW

## 2023-11-13 NOTE — GROUP NOTE
"Process Group Note    PATIENT'S NAME: Gabriela Gray  MRN:   [id number]  :   1960  ACCT. NUMBER: [id number]  DATE OF SERVICE: 23  START TIME:  1:00 PM  END TIME:  1:50 PM  FACILITATOR: Eleonora Llamas LGSW  TOPIC:  Process Group    Diagnoses:  296.33 (F33.2) Major Depressive Disorder, Recurrent Episode, Severe _  300.02 (F41.1) Generalized Anxiety Disorder.  300.23 (F40.10) Social Anxiety Disorder  Substance-Related & Addictive Disorders Opioid Use Disorder, Specify if:  On a maintenance therapy with a severity of:  304.00 (F11.20) Severe.    [facility] Adult Mental Health Outpatient Programs  TRACK: 55+ IOP/DT 3    NUMBER OF PARTICIPANTS: 4        Data:    Session content: At the start of this group, patients were invited to check in by identifying themselves, describing their current emotional status, and identifying issues to address in this group.   Area(s) of treatment focus addressed in this session included Symptom Management, Personal Safety, and Community Resources/Discharge Planning.  Patient (goes by Jess) attended her first day of 55+ IOP/DT 3 group.  Writer oriented her to group and outlined expectations. She shared presenting issues, including burning of the extremities (mainly feet) and numbness in her right hand from two rare pain disorders; leaving her long term partner for another man. She reports going through active withdrawal from opioid abuse since stopping use last Wednesday with symptoms of muscle spasms and digestive issues.  She shared that over the years she had stockpiled pain medication (opioids) and this past September had an intentional overdose. Reported mood is \"very bad, not hopeful, in pain\".   Client reports no SI \"since she was unsuccessful\" with her suicide attempt. She adds \"I don't want to hang myself and have my son find me\".  Protective factor includes living with her daughter.  Client denies any chemical use.  Client is taking medications as " "prescribed.  She reports taking a muscle relaxer, Zoloft and Klonopin.  Her current pain management plan includes over the counter pain medication and icing her feet. Writer encouraged self-care strategies to which she replied \"If I haven't found self-care strategies in my 63 years, I'm not about to now\".  Peers offered supportive feedback and validation.    Therapeutic Interventions/Treatment Strategies:  Psychotherapist offered support, feedback and validation and reinforced use of skills. Treatment modalities used include Cognitive Behavioral Therapy. Interventions include Coping Skills: Addressed barriers to utilizing coping skills when in distress and Symptoms Management: Promoted understanding of their diagnoses and how it impacts their functioning.    Assessment:    Patient response:   Patient responded to session by accepting feedback, listening, focusing on goals, being attentive, and accepting support    Possible barriers to participation / learning include: and no barriers identified    Health Issues:   Yes: Pain, Associated Psychological Distress       Substance Use Review:   Substance Use:  Opioids.  and Last use: Wednesday 11/8/2023.  In active withdrawal (see narrative portion for more details)    Mental Status/Behavioral Observations  Appearance:   Appropriate   Eye Contact:   Good   Psychomotor Behavior: Normal   Attitude:   Cooperative  Interested Friendly Pleasant  Orientation:   All  Speech   Rate / Production: Normal    Volume:  Normal   Mood:    Depressed   Affect:    Subdued  Tearful  Thought Content:   Clear and Safety denies any current safety concerns including suicidal ideation, self-harm, and homicidal ideation  Thought Form:  Coherent  Logical     Insight:    Good     Plan:   Safety Plan: No current safety concerns identified.  Recommended that patient call 911 or go to the local ED should there be a change in any of these risk factors.   Barriers to treatment: None identified  Patient " Contracts (see media tab):  None  Substance Use:  Patient reported on active opoid withdrawal symptoms    Continue or Discharge: Patient will continue in 55+ Program (55+) as planned. Patient is likely to benefit from learning and using skills as they work toward the goals identified in their treatment plan.      Eleonora Llamas, ZAHRA  November 13, 2023

## 2023-11-13 NOTE — PROGRESS NOTES
"RN Review of Medical History / Physical Health Screen  Outpatient Mental Health Programs - [facility] Adult Mental Health Outpatient Programs    PATIENT'S NAME: Gabriela Gray  Preferred name: Gabriela   63 year old  MRN:   [id number]  :   1960  ACCT. NUMBER: [id number]  CURRENT AGE:  63 year old    DATE OF DIAGNOSTIC ASSESSMENT: 23  DATE OF ADMISSION: 23     Please see Diagnostic Assessment for additional Medical History.     General Health:   Have you had any exposure to any communicable disease in the past 2-3 weeks? no     Do you have a history of seizures?     If so, do you have a seizure plan? Known triggers?     Notify patient that we will call 911 (if virtual) or a code (if in-person), if we were to witness seizure during group. no    N/A     Nutrition:    Are you on a special diet? If yes, please explain:  no   Do you have any concerns regarding your nutritional status? If yes, please explain:  no   Have you had any appetite changes in the last 3 months?  No     Have you had any weight loss or weight gain in the last 3 months?  No           Height/Weight Review:  Patient reported height:  5'6\"      Patient reports weight:  Date last checked:  142.1 lb   23       Patient height and weight recorded by RN in epic flowsheet: No; Unable to measure  Programmatic Care currently provided via telehealth. All pt weights and heights will be collected through patient self-report and recorded in physical health screening progress note upon admission to the program.      BMI Review:  Was the patient informed of BMI? No.     Findings Normal, No Intervention         Fall Risk:   Have you had any falls in the past 3 months? no     Do you currently use any assistive devices for mobility?   no      Does the patient have medication concerns? no and has appointment with new psychiatrist this coming Tuesday     Was an MTM referral placed? No       Does the patient have any acute " or chronic pain concerns that might impact participation in the program? Yes; has a pain disorder which results in a burning sensation in the extremities. Currently rates pain 6/10.       Additional Comments/Assessment: Patient's affect is blunted    Per completion of the Medical History / Physical Health Screen, is there a recommendation to see / follow up with a primary care physician/clinic or dentist?    Yes, Recommendations:   Pain - patient reports her last use of opioids was last Wednesday. She reports she's in active withdrawal including muscle cramping, anxiety, and diarrhea. This was communicated to attending physician.           Yin Zhou RN  11/13/2023

## 2023-11-14 NOTE — PROGRESS NOTES
Adult Mental Health Intensive Outpatient Discharge Summary/Instructions      Patient: Gabriela Gray MRN: [id number]  : 1960 Age: 63 Sex: Female    Admission Date: 2023  Discharge Date: 2023 discharging early per patient request  Diagnosis: 296.33 (F33.2) Major Depressive Disorder, Recurrent Episode, Severe _  300.02 (F41.1) Generalized Anxiety Disorder.  300.23 (F40.10) Social Anxiety Disorder  Substance-Related & Addictive Disorders Opioid Use Disorder, Specify if:  On a maintenance therapy with a severity of:  304.00 (F11.20) Severe.    Focus of Treatment / Progress    Personal Safety: patient denied safety concerns at time of discharge.     * Follow your safety plan     * Call crisis lines as needed:    [facility] [phone] [facility] [phone]  [facility] [phone] Crisis Connection [phone]  [facility] [phone] [facility] [phone]  [facility] [phone] National Suicide Prevention Text or call 988   [facility] [phone] Suicide Prevention [phone]  [facility] [phone]    Managing symptoms of:  Depression, Anxiety, Chronic Pain    Community support/health:  Lives with daughter, supportive family  PEER SUPPORT PROGRAMS:     [place] Mental Health Warmline  https://mentalhealthmn.org/support/[place]-warmline/   The [place] Warmline is safe, anonymous, confidential, free, and here to help you when you need it.  Open Monday-Saturday, 12 PM to 10 PM   [phone]; Toll Free 855-WARMLINE or [phone] or text  Support  to 63732     The Peer Support Connection Warmline of [place]  The [facility] is a safe and free way for individuals to receive confidential and anonymous one on one peer support from trained Peers, Certified Peer Support Specialists, and Recovery Coaches.  https://mnwitw.org/mnwaredi   Available from 5pm - 9am  (7 days a week/365 days a  "year)  [phone]  Managing Symptoms and Preventing Relapse    * Go to all of your appointments    * Take all medications as directed.      * Carry a current list if medication with you    * Do not use illicit (street) drugs.  Avoid alcohol    * Report these symptoms to your care team. These are early signs of relapse:   Thoughts of suicide   Losing more sleep   Increased confusion   Mood getting worse   Other:  Increased isolation    *Use these skills daily:  Practice Mindfulness, Reality Checks, Opposite to Emotion, Identify thought distortions & feelings, distractions, express self, keep appointments, take medications daily, journal, use gratitude, self-compassion, Wise Mind, Use of the 5 Senses, grounding, Talk to someone y3ou trust at least one time weekly, set boundaries and say \"no\", be assertive, act opposite of negative feelings, accept challenges with a positive attitude, exercise at least three times per week for 30 minutes,  get enough sleep, eat healthy foods, get into a good routine    Copy of summary sent to: client via D-Share    Follow up with psychiatrist / main caregiver[facility]    Next visit: Patient will schedule    Follow up with your therapist: Rajni Rose PsyD   Next visit: December 2023    Go to group therapy and / or support groups at: Please contact program line at [phone] if interested in returning to programming.    Consider [facility] support groups.   Listings for the free support groups are at https://[facility].org/support/[facility]-[place]-support-groups/ (340) [phone]   free support groups for numerous group therapy topics & issues including: individual, family, couples, LGBTQ, Young Adults, parenting, anxiety, grief and loss, depression, and  others  Virtual Peer Support Network by [facility]: Virtual Peer Support Network (VPSN) -- [facility]  Mental Health Advocacy (mnwitw.org) Each meeting (via Zoom) will " provide tools and support to help you lead a healthier and happier life. We offer sessions that focus on specific topics such as recovery support, senior living, LGBTQ+ support and more. See our calendar here. Available from 10am - 4pm  (7 Days a Week)  To find more support groups: https://mentalhealthmn.org/what-we-do/peer-support/support-groups/  To find more community based mental health resources: https://mentalhealthmn.org/community-resources/     See your medical doctor about:  Annual physical exams    Other:  Your team appreciates the opportunity to work with you and wishes you the best of luck.  Please call [phone] with any further questions.    Client Signature: unable to sign due to discharge over the phone  Date___11/14/2023    Staff Signature:  ____  NELSY Estrada, LILLIAM Clinical   Date ___11/14/2023     Main office [facility]:  [phone]

## 2023-11-14 NOTE — TELEPHONE ENCOUNTER
----- Message from ZAHRA Estrada sent at 11/14/2023  3:59 PM CST -----  Regarding: Pt D/C from IOP/DT 3 55+  Jess is discharging from 55+ IOP/DT 3 (M,W,Th, F 1-4pm) effective today 11/14/2023.  Please remove any future appointments from the [facility]YN.    All the best,    ZAHRA Estrada

## 2023-11-14 NOTE — PROGRESS NOTES
"Individualized Treatment Plan       PATIENT'S NAME: Gabriela Gray   MRN:   [id number]  :   1960    LEVEL OF CARE/ PROGRAM PARTICIPATION:     Program Participation: Adult  Outpatient Programs: 55+ IOP/DT 3, M,W,TH,F 1-4pm    Program Admission Date: 2023     Program Anticipated Discharge Date: 2023     Date of Treatment Plan: 2023    Primary Diagnosis:  296.33 (F33.2) Major Depressive Disorder, Recurrent Episode, Severe _  300.02 (F41.1) Generalized Anxiety Disorder.  300.23 (F40.10) Social Anxiety Disorder  Substance-Related & Addictive Disorders Opioid Use Disorder, Specify if:  On a maintenance therapy with a severity of:  304.00 (F11.20) Severe.    Multidisciplinary Team Members: Jordon Penny MD; Eleonora Llamas SW;  May[facility] Bartlett [facility]; Marnie Malin, OTR/L; Afshan Kelly RN      Chief Complaint: The reason for seeking services at this time is: \"depression \"      Long Term Goal: Patients long-term stated goal for treatment: \"***\"     Patient Participation in Plan:   Patient did contribute to goals and plan. Patient does  agree with plan. Patient did receive a copy of treatment plan. Patient will notify treatment team if she wants to include family or other supports in treatment planning.  NOTE: Patient will need to sign Informed Consent / Release of Information prior to any contact.     Patient Strengths:   creative, good listener, has a previous history of therapy, and open to learning  Per Diagnostic Assessment 2023: family support and motivation    Patient Limitations:  Anxiety  Depressive symptoms   Per Diagnostic Assessment 2023: financial hardship, lack of social support, and physical health concerns.       Abuse Prevention Plan:   Treatment team will provide education regarding skill development to address symptom management, life skills, wellness, discharge planning, and personal safety.  Collaborate with patients internal and external providers to " "coordinate care.  Treatment will be provided in a safe, therapeutic environment.  Program provider will offer medication adjustment/management.  Program will monitor for use of substances.     Discharge Criteria:  Patient will discharge from program when it is determined that factors leading to admission have been addressed to the extent that the patient can be safely transitioned to a less intensive level of care. See discharge goals/plan below.     Treatment Focus:    Area of Treatment Focus: Personal Safety  Problem Description:   Per Diagnostic Assessment 11/1/2023:  \"Patient's EMR also shows that she was inpatient from 09/23 to 10/03/2023.  Patient confirms that she was at [facility] for approximately 8 to 9 days after an intentional overdose and having subsequent medical issues.  She was then sent to a geropsych unit at [facility] for a few days.  Patient reports she intentionally overdosed on several of her medications in an attempt to end her life\".  SI: Patient denies she is having any suicidal ideation today and feels that it stopped when she was in the hospital.  Patient indicates that she would not try to do anything to hurt herself again because of her family and also because she does not have access to anything.  Patient reports two prior suicide attempts, once in her 20s and the one recently.  She denies any history of aborted or interrupted attempts.  Patient states she does feel safe.  C-SSRS and safety plan completed.  Patient declined a copy of her safety plan as she does not want it.  SH: denies current or past  HI: denies current or past.  Denies any history of aggressive behavior  Patient Identified Goal: \"***\"  Measurable Goal: Patient will learn and apply coping strategies to manage suicidal or self-injurious behavior urges. Patient will use and/or develop their individualized copy plan for safety and notify staff of all safety concerns.  Goal Status: Active   Intervention " "Strategies: Staff will provide therapeutic assessment and safety planning as needed.  Goal Start Date: 11/17/2023 Goal Target Date: 12/17/2023  Review Date: *** New Target Date: ***  Progress Update: ***  Discharge Date: ***  Progress Update: ***        Problem Description: *** / \"***\"  Area of Treatment Focus: Abstinence & Relapse Prevention  Patient Identified Goal: \"***\"  Measurable Goal: {Relapse Prevention Goals:908276}   Goal Status: {Goal Status:8142592}   Intervention Strategies: Staff will {Intervention Strategies:467316}.  Goal Start Date: 11/17/2023 Goal Target Date: 12/17/2023  Review Date: *** New Target Date: ***  Progress Update: ***  Discharge Date: ***  Progress Update: ***      Area of Treatment Focus: Symptom Management  Problem Description:   Per Diagnostic Assessment 11/1/2023:  Depression: Change in sleep, Lack of interest, Excessive or inappropriate guilt, Change in energy level, Psychomotor slowing or agitation, Suicidal ideation, Feelings of hopelessness, Feelings of helplessness, Low self-worth, Ruminations, Feeling sad, down, or depressed, Withdrawn, Poor hygeine, and numb feeling  Janine: Elevated mood, Racing thoughts, and Impulsiveness \"swings\"- sudden, impulsive, risky decisions and felt very excited for 1-2 months. Sudden trip to [place] after not drive on freeway for 2 years. Spending more than should on Legos, jewelry supplies for the past two years. Hard to pay bills so children had to help. Never diagnosed with Bipolar. Did to feel better when miserable and not compulsion.  Anxiety: Excessive worry, Nervousness, Physical complaints, such as headaches, stomachaches, muscle tension, Social anxiety, Fears/phobias being in pain, Sleep disturbance, Ruminations, and Poor concentration always struggled with feeling inadequate around others, not enjoy people, always felt disconnected, worried about perceptions and judgement from others, needing to act. Whole life.  Panic: Palpitations, " "Sense of impending doom, Hot or cold flashes, and afraid feeling  ADD / ADHD: Inattentive, Poor task completion, Poor organizational skills, Distractibility, and Forgetful Dx: 2005 used to take Ritalin but doctor stopped and not say why. Stopped in 2010.  Patient Identified Goal: \"***\"  Measurable Goal: {OP MH Symptom Management Goals:114200}  Goal Status: Active   Intervention Strategies: Staff will assist in identifying and applying coping skills and provide education.  Goal Start Date: 11/17/2023 Goal Target Date: 12/17/2023  Review Date: *** New Target Date: ***  Progress Update: ***  Discharge Date: ***  Progress Update: ***        Problem Description: *** / \"***\"  Area of Treatment Focus: Life Skills / Independent Living Skills  Patient Identified Goal: \"***\"  Measurable Goal: {OP MH TP LIFE SKILLS:847357}  Goal Status: {Goal Status:3395162}   Intervention Strategies: Staff will {Intervention Strategies:150845}.  Goal Start Date: 11/17/2023 Goal Target Date: 12/17/2023  Review Date: *** New Target Date: ***  Progress Update: ***  Discharge Date: ***  Progress Update: ***        Problem Description: *** / \"***\"  Area of Treatment Focus: Wellness  Patient Identified Goal: \"***\"  Measurable Goal: {OP MH TP Wellness Goals:969643}  Goal Status: {Goal Status:3356788}   Intervention Strategies: Staff will {Intervention Strategies:760043}.  Goal Start Date: 11/17/2023 Goal Target Date: 12/17/2023  Review Date: *** New Target Date: ***  Progress Update: ***  Discharge Date: ***  Progress Update: ***      Problem Description: *** / \"***\"  Area of Treatment Focus: Socialization /Interpersonal Relationship Skills  Patient Identified Goal: \"***\"  Measurable Goal: {OP MH TP SOCIAL Goals:221392}  Goal Status: {Goal Status:2640849}   Intervention Strategies: Staff will {Intervention Strategies:309415}.  Goal Start Date: 11/17/2023 Goal Target Date: 12/17/2023  Review Date: *** New Target Date: ***  Progress Update: " "***  Discharge Date: ***  Progress Update: ***        Area of Treatment Focus: Community Resources/Discharge Plan  Psychiatric Provider Patience [facility]  Individual Therapist/Name:  Patient was unable to recall, Lissett (Rajni Rose, Farnaz every three weeks???)  She also attended [facility] 7 years ago and went for 2 years   Problem Description: *** / \"***\"  Patient Identified Goal: \"***\"  Measurable Goal: {Discharge Plan Goals:246833}  Goal Status: Active   Intervention Strategies: Staff will assist in identifying and problem solving barriers and assist with establishing community resources to strengthen support network.  Goal Start Date: 11/17/2023 Goal Target Date: 12/17/2023  Review Date: *** New Target Date: ***  Progress Update: ***  Discharge Date: ***  Progress Update: ***        Assessments Completed:  The following assessments were completed by patient for this visit:  PHQ-2, SANG-7, PROMIS-10 on 11/13/2023 (uploaded to patient chart)    Review Date: Does Gabriela Gray continue to meet criteria to participate in the program?   11/17/2023 initial treatment plan yes ***   *** {YES NO change discharge info above:841578}   *** {YES NO change discharge info above:444960}   *** {YES NO change discharge info above:626015}   *** {YES NO change discharge info above:631807}       Acknowledgement of Current Treatment Plan       I have reviewed my treatment plan with my treatment team member (s) on 11/17/2023.   I agree with the plan as it is written in the electronic health record.     Name:                   Signature:                                                          Date:  Gabriela Gray       Jordon Penny MD  Psychiatrist/Medical Director         NOTE: Patient signatures are completed manually and scanned into the electronic medical record. See \"Media\" tab in epic.       "

## 2025-05-31 NOTE — PROVIDER NOTIFICATION
MD Notification    Notified Person: MD    Notified Person Name:  Jeferson    Notification Date/Time: 9/24/23 @ 1050    Notification Interaction: Vocera message    Purpose of Notification: Pt is currently refusing brain MRI. She doesn't think it's necessary and also thinks it's not covered by insurance, so doesn't want it done. I will ask again in a little while.    Orders Received:    Comments: MRI staff updated about pt's refusal     No